# Patient Record
Sex: FEMALE | Race: WHITE | NOT HISPANIC OR LATINO
[De-identification: names, ages, dates, MRNs, and addresses within clinical notes are randomized per-mention and may not be internally consistent; named-entity substitution may affect disease eponyms.]

---

## 2017-01-18 ENCOUNTER — APPOINTMENT (OUTPATIENT)
Dept: OBGYN | Facility: CLINIC | Age: 82
End: 2017-01-18

## 2017-01-24 ENCOUNTER — APPOINTMENT (OUTPATIENT)
Dept: INTERNAL MEDICINE | Facility: CLINIC | Age: 82
End: 2017-01-24

## 2017-01-24 VITALS
SYSTOLIC BLOOD PRESSURE: 160 MMHG | HEART RATE: 68 BPM | WEIGHT: 132 LBS | BODY MASS INDEX: 25.78 KG/M2 | TEMPERATURE: 98.6 F | DIASTOLIC BLOOD PRESSURE: 80 MMHG

## 2017-01-24 VITALS — SYSTOLIC BLOOD PRESSURE: 124 MMHG | DIASTOLIC BLOOD PRESSURE: 80 MMHG

## 2017-01-25 ENCOUNTER — APPOINTMENT (OUTPATIENT)
Dept: OBGYN | Facility: CLINIC | Age: 82
End: 2017-01-25

## 2017-01-25 VITALS
HEART RATE: 84 BPM | DIASTOLIC BLOOD PRESSURE: 78 MMHG | HEIGHT: 60 IN | BODY MASS INDEX: 25.91 KG/M2 | WEIGHT: 132 LBS | SYSTOLIC BLOOD PRESSURE: 166 MMHG

## 2017-01-31 ENCOUNTER — OTHER (OUTPATIENT)
Age: 82
End: 2017-01-31

## 2017-02-01 RX ORDER — NYSTATIN AND TRIAMCINOLONE ACETONIDE 100000; 1 MG/G; MG/G
100000-0.1 CREAM TOPICAL TWICE DAILY
Qty: 1 | Refills: 1 | Status: ACTIVE | COMMUNITY
Start: 2017-01-31 | End: 1900-01-01

## 2017-02-13 ENCOUNTER — OTHER (OUTPATIENT)
Age: 82
End: 2017-02-13

## 2017-03-01 ENCOUNTER — APPOINTMENT (OUTPATIENT)
Dept: INTERNAL MEDICINE | Facility: CLINIC | Age: 82
End: 2017-03-01

## 2017-03-01 ENCOUNTER — LABORATORY RESULT (OUTPATIENT)
Age: 82
End: 2017-03-01

## 2017-03-01 VITALS
DIASTOLIC BLOOD PRESSURE: 70 MMHG | SYSTOLIC BLOOD PRESSURE: 110 MMHG | TEMPERATURE: 98.4 F | WEIGHT: 133 LBS | BODY MASS INDEX: 26.11 KG/M2 | HEIGHT: 60 IN

## 2017-03-02 LAB
ALBUMIN SERPL ELPH-MCNC: 4.4 G/DL
ALP BLD-CCNC: 108 U/L
ALT SERPL-CCNC: 37 U/L
ANION GAP SERPL CALC-SCNC: 19 MMOL/L
AST SERPL-CCNC: 35 U/L
BASOPHILS # BLD AUTO: 0.02 K/UL
BASOPHILS NFR BLD AUTO: 0.3 %
BILIRUB SERPL-MCNC: 1 MG/DL
BUN SERPL-MCNC: 21 MG/DL
CALCIUM SERPL-MCNC: 10.5 MG/DL
CHLORIDE SERPL-SCNC: 102 MMOL/L
CO2 SERPL-SCNC: 21 MMOL/L
CREAT SERPL-MCNC: 0.87 MG/DL
EOSINOPHIL # BLD AUTO: 0.07 K/UL
EOSINOPHIL NFR BLD AUTO: 1 %
GLUCOSE SERPL-MCNC: 97 MG/DL
HCT VFR BLD CALC: 39.6 %
HGB BLD-MCNC: 13.2 G/DL
IMM GRANULOCYTES NFR BLD AUTO: 0 %
LYMPHOCYTES # BLD AUTO: 2.73 K/UL
LYMPHOCYTES NFR BLD AUTO: 38.9 %
MAN DIFF?: NORMAL
MCHC RBC-ENTMCNC: 30.8 PG
MCHC RBC-ENTMCNC: 33.3 GM/DL
MCV RBC AUTO: 92.5 FL
MONOCYTES # BLD AUTO: 0.71 K/UL
MONOCYTES NFR BLD AUTO: 10.1 %
NEUTROPHILS # BLD AUTO: 3.49 K/UL
NEUTROPHILS NFR BLD AUTO: 49.7 %
PLATELET # BLD AUTO: 184 K/UL
POTASSIUM SERPL-SCNC: 5.1 MMOL/L
PROT SERPL-MCNC: 7.6 G/DL
RBC # BLD: 4.28 M/UL
RBC # FLD: 14 %
SODIUM SERPL-SCNC: 142 MMOL/L
WBC # FLD AUTO: 7.02 K/UL

## 2017-03-08 ENCOUNTER — OUTPATIENT (OUTPATIENT)
Dept: OUTPATIENT SERVICES | Facility: HOSPITAL | Age: 82
LOS: 1 days | Discharge: HOME | End: 2017-03-08
Payer: MEDICARE

## 2017-03-08 ENCOUNTER — TRANSCRIPTION ENCOUNTER (OUTPATIENT)
Age: 82
End: 2017-03-08

## 2017-03-08 DIAGNOSIS — Z95.1 PRESENCE OF AORTOCORONARY BYPASS GRAFT: ICD-10-CM

## 2017-03-08 DIAGNOSIS — H25.11 AGE-RELATED NUCLEAR CATARACT, RIGHT EYE: ICD-10-CM

## 2017-03-08 DIAGNOSIS — Z88.4 ALLERGY STATUS TO ANESTHETIC AGENT: ICD-10-CM

## 2017-03-08 DIAGNOSIS — I10 ESSENTIAL (PRIMARY) HYPERTENSION: ICD-10-CM

## 2017-03-08 DIAGNOSIS — I25.10 ATHEROSCLEROTIC HEART DISEASE OF NATIVE CORONARY ARTERY WITHOUT ANGINA PECTORIS: ICD-10-CM

## 2017-03-08 DIAGNOSIS — E78.5 HYPERLIPIDEMIA, UNSPECIFIED: ICD-10-CM

## 2017-03-08 DIAGNOSIS — Z98.89 OTHER SPECIFIED POSTPROCEDURAL STATES: Chronic | ICD-10-CM

## 2017-03-08 PROCEDURE — C1780: CPT

## 2017-03-08 PROCEDURE — 66984 XCAPSL CTRC RMVL W/O ECP: CPT | Mod: RT

## 2017-04-02 ENCOUNTER — RX RENEWAL (OUTPATIENT)
Age: 82
End: 2017-04-02

## 2017-04-26 ENCOUNTER — LABORATORY RESULT (OUTPATIENT)
Age: 82
End: 2017-04-26

## 2017-05-03 ENCOUNTER — APPOINTMENT (OUTPATIENT)
Dept: INTERNAL MEDICINE | Facility: CLINIC | Age: 82
End: 2017-05-03

## 2017-05-03 VITALS
DIASTOLIC BLOOD PRESSURE: 70 MMHG | BODY MASS INDEX: 26.31 KG/M2 | SYSTOLIC BLOOD PRESSURE: 120 MMHG | WEIGHT: 134 LBS | HEIGHT: 60 IN

## 2017-06-01 ENCOUNTER — APPOINTMENT (OUTPATIENT)
Dept: INTERNAL MEDICINE | Facility: CLINIC | Age: 82
End: 2017-06-01

## 2017-06-01 VITALS
TEMPERATURE: 98.1 F | HEIGHT: 60 IN | WEIGHT: 133 LBS | BODY MASS INDEX: 26.11 KG/M2 | DIASTOLIC BLOOD PRESSURE: 76 MMHG | SYSTOLIC BLOOD PRESSURE: 116 MMHG

## 2017-06-09 ENCOUNTER — APPOINTMENT (OUTPATIENT)
Dept: ORTHOPEDIC SURGERY | Facility: CLINIC | Age: 82
End: 2017-06-09

## 2017-06-09 VITALS — RESPIRATION RATE: 14 BRPM | BODY MASS INDEX: 26.5 KG/M2 | WEIGHT: 135 LBS | HEIGHT: 60 IN

## 2017-06-15 ENCOUNTER — OUTPATIENT (OUTPATIENT)
Dept: OUTPATIENT SERVICES | Facility: HOSPITAL | Age: 82
LOS: 1 days | Discharge: HOME | End: 2017-06-15
Payer: MEDICARE

## 2017-06-15 ENCOUNTER — TRANSCRIPTION ENCOUNTER (OUTPATIENT)
Age: 82
End: 2017-06-15

## 2017-06-15 DIAGNOSIS — Z98.89 OTHER SPECIFIED POSTPROCEDURAL STATES: Chronic | ICD-10-CM

## 2017-06-15 DIAGNOSIS — Z79.82 LONG TERM (CURRENT) USE OF ASPIRIN: ICD-10-CM

## 2017-06-15 DIAGNOSIS — F03.90 UNSPECIFIED DEMENTIA WITHOUT BEHAVIORAL DISTURBANCE: ICD-10-CM

## 2017-06-15 DIAGNOSIS — H25.12 AGE-RELATED NUCLEAR CATARACT, LEFT EYE: ICD-10-CM

## 2017-06-15 DIAGNOSIS — Z79.899 OTHER LONG TERM (CURRENT) DRUG THERAPY: ICD-10-CM

## 2017-06-15 DIAGNOSIS — Z85.3 PERSONAL HISTORY OF MALIGNANT NEOPLASM OF BREAST: ICD-10-CM

## 2017-06-15 DIAGNOSIS — H26.9 UNSPECIFIED CATARACT: ICD-10-CM

## 2017-06-15 DIAGNOSIS — I25.10 ATHEROSCLEROTIC HEART DISEASE OF NATIVE CORONARY ARTERY WITHOUT ANGINA PECTORIS: ICD-10-CM

## 2017-06-15 DIAGNOSIS — Z95.1 PRESENCE OF AORTOCORONARY BYPASS GRAFT: ICD-10-CM

## 2017-06-15 PROCEDURE — 66984 XCAPSL CTRC RMVL W/O ECP: CPT | Mod: LT

## 2017-06-15 PROCEDURE — C1780: CPT

## 2017-06-26 ENCOUNTER — APPOINTMENT (OUTPATIENT)
Dept: ORTHOPEDIC SURGERY | Facility: CLINIC | Age: 82
End: 2017-06-26

## 2017-06-26 VITALS — WEIGHT: 135 LBS | RESPIRATION RATE: 14 BRPM | HEIGHT: 60 IN | BODY MASS INDEX: 26.5 KG/M2

## 2017-07-24 ENCOUNTER — APPOINTMENT (OUTPATIENT)
Dept: ORTHOPEDIC SURGERY | Facility: CLINIC | Age: 82
End: 2017-07-24

## 2017-07-24 VITALS — HEIGHT: 60 IN | RESPIRATION RATE: 14 BRPM | WEIGHT: 135 LBS | BODY MASS INDEX: 26.5 KG/M2

## 2017-08-18 ENCOUNTER — RX RENEWAL (OUTPATIENT)
Age: 82
End: 2017-08-18

## 2017-08-31 ENCOUNTER — APPOINTMENT (OUTPATIENT)
Dept: INTERNAL MEDICINE | Facility: CLINIC | Age: 82
End: 2017-08-31
Payer: MEDICARE

## 2017-08-31 ENCOUNTER — NON-APPOINTMENT (OUTPATIENT)
Age: 82
End: 2017-08-31

## 2017-08-31 VITALS
SYSTOLIC BLOOD PRESSURE: 150 MMHG | TEMPERATURE: 99.1 F | WEIGHT: 139 LBS | BODY MASS INDEX: 27.15 KG/M2 | DIASTOLIC BLOOD PRESSURE: 80 MMHG

## 2017-08-31 VITALS — DIASTOLIC BLOOD PRESSURE: 80 MMHG | SYSTOLIC BLOOD PRESSURE: 120 MMHG

## 2017-08-31 PROCEDURE — 93040 RHYTHM ECG WITH REPORT: CPT | Mod: 59

## 2017-08-31 PROCEDURE — 93000 ELECTROCARDIOGRAM COMPLETE: CPT

## 2017-08-31 PROCEDURE — 99214 OFFICE O/P EST MOD 30 MIN: CPT | Mod: 25

## 2017-09-11 ENCOUNTER — APPOINTMENT (OUTPATIENT)
Dept: ORTHOPEDIC SURGERY | Facility: CLINIC | Age: 82
End: 2017-09-11
Payer: MEDICARE

## 2017-09-11 PROCEDURE — 73630 X-RAY EXAM OF FOOT: CPT | Mod: LT

## 2017-09-11 PROCEDURE — 99214 OFFICE O/P EST MOD 30 MIN: CPT

## 2017-09-28 ENCOUNTER — LABORATORY RESULT (OUTPATIENT)
Age: 82
End: 2017-09-28

## 2017-10-02 ENCOUNTER — APPOINTMENT (OUTPATIENT)
Dept: INTERNAL MEDICINE | Facility: CLINIC | Age: 82
End: 2017-10-02
Payer: MEDICARE

## 2017-10-02 VITALS
HEIGHT: 60 IN | WEIGHT: 140 LBS | TEMPERATURE: 97.4 F | BODY MASS INDEX: 27.48 KG/M2 | DIASTOLIC BLOOD PRESSURE: 80 MMHG | SYSTOLIC BLOOD PRESSURE: 140 MMHG

## 2017-10-02 DIAGNOSIS — R53.83 OTHER FATIGUE: ICD-10-CM

## 2017-10-02 PROCEDURE — G0008: CPT

## 2017-10-02 PROCEDURE — 93000 ELECTROCARDIOGRAM COMPLETE: CPT

## 2017-10-02 PROCEDURE — 90686 IIV4 VACC NO PRSV 0.5 ML IM: CPT

## 2017-10-02 PROCEDURE — 94010 BREATHING CAPACITY TEST: CPT

## 2017-10-02 PROCEDURE — 99214 OFFICE O/P EST MOD 30 MIN: CPT | Mod: 25

## 2017-11-29 ENCOUNTER — APPOINTMENT (OUTPATIENT)
Dept: NEUROLOGY | Facility: CLINIC | Age: 82
End: 2017-11-29
Payer: MEDICARE

## 2017-11-29 VITALS
BODY MASS INDEX: 27.48 KG/M2 | WEIGHT: 140 LBS | TEMPERATURE: 97.6 F | OXYGEN SATURATION: 96 % | HEIGHT: 60 IN | DIASTOLIC BLOOD PRESSURE: 81 MMHG | HEART RATE: 74 BPM | SYSTOLIC BLOOD PRESSURE: 134 MMHG

## 2017-11-29 PROCEDURE — 99215 OFFICE O/P EST HI 40 MIN: CPT

## 2018-01-16 ENCOUNTER — OUTPATIENT (OUTPATIENT)
Dept: OUTPATIENT SERVICES | Facility: HOSPITAL | Age: 83
LOS: 1 days | End: 2018-01-16
Payer: MEDICARE

## 2018-01-16 VITALS
HEIGHT: 60 IN | OXYGEN SATURATION: 99 % | RESPIRATION RATE: 18 BRPM | SYSTOLIC BLOOD PRESSURE: 159 MMHG | TEMPERATURE: 98 F | HEART RATE: 68 BPM | WEIGHT: 139.99 LBS | DIASTOLIC BLOOD PRESSURE: 72 MMHG

## 2018-01-16 DIAGNOSIS — Z98.89 OTHER SPECIFIED POSTPROCEDURAL STATES: Chronic | ICD-10-CM

## 2018-01-16 DIAGNOSIS — I66.9 OCCLUSION AND STENOSIS OF UNSPECIFIED CEREBRAL ARTERY: ICD-10-CM

## 2018-01-16 PROCEDURE — 93010 ELECTROCARDIOGRAM REPORT: CPT

## 2018-01-16 PROCEDURE — 33284: CPT

## 2018-01-16 PROCEDURE — 93005 ELECTROCARDIOGRAM TRACING: CPT

## 2018-01-16 RX ORDER — SODIUM CHLORIDE 9 MG/ML
3 INJECTION INTRAMUSCULAR; INTRAVENOUS; SUBCUTANEOUS EVERY 8 HOURS
Qty: 0 | Refills: 0 | Status: DISCONTINUED | OUTPATIENT
Start: 2018-01-16 | End: 2018-01-31

## 2018-01-16 NOTE — H&P CARDIOLOGY - PMH
Breast cancer    CAD (coronary artery disease)    HLD (hyperlipidemia)    Peripheral vision loss    Seizure    TIA (transient ischemic attack)

## 2018-01-16 NOTE — H&P CARDIOLOGY - HISTORY OF PRESENT ILLNESS
83 year old  female pt PMHx of CAD s/p 5 vessel CABG, seizures, TIA, breast cancer s/p left lumpectomy, presents for loop recorder extraction. Pt reports she had arrhythmia one time and had Loop recorder insertion, denies chest pain, palpitation or SOB.

## 2018-01-16 NOTE — H&P CARDIOLOGY - PSH
S/P CABG x 5  2006  S/P carotid endarterectomy  right, 2006  S/P lumpectomy, right breast    S/P tonsillectomy

## 2018-01-16 NOTE — H&P CARDIOLOGY - FAMILY HISTORY
Mother  Still living? No  Family history of heart attack, Age at diagnosis: Age Unknown  Family history of ovarian cancer, Age at diagnosis: Age Unknown     Sibling  Still living? No  Family history of heart disease, Age at diagnosis: Age Unknown

## 2018-01-19 ENCOUNTER — LABORATORY RESULT (OUTPATIENT)
Age: 83
End: 2018-01-19

## 2018-01-24 ENCOUNTER — APPOINTMENT (OUTPATIENT)
Dept: INTERNAL MEDICINE | Facility: CLINIC | Age: 83
End: 2018-01-24
Payer: MEDICARE

## 2018-01-24 VITALS
WEIGHT: 140 LBS | BODY MASS INDEX: 27.48 KG/M2 | SYSTOLIC BLOOD PRESSURE: 120 MMHG | DIASTOLIC BLOOD PRESSURE: 66 MMHG | HEIGHT: 60 IN

## 2018-01-24 PROCEDURE — 90715 TDAP VACCINE 7 YRS/> IM: CPT | Mod: GY

## 2018-01-24 PROCEDURE — 99214 OFFICE O/P EST MOD 30 MIN: CPT | Mod: 25

## 2018-01-24 PROCEDURE — 90471 IMMUNIZATION ADMIN: CPT | Mod: GY

## 2018-01-31 ENCOUNTER — APPOINTMENT (OUTPATIENT)
Dept: ELECTROPHYSIOLOGY | Facility: CLINIC | Age: 83
End: 2018-01-31
Payer: MEDICARE

## 2018-01-31 ENCOUNTER — NON-APPOINTMENT (OUTPATIENT)
Age: 83
End: 2018-01-31

## 2018-01-31 VITALS — HEART RATE: 71 BPM | OXYGEN SATURATION: 98 % | SYSTOLIC BLOOD PRESSURE: 165 MMHG | DIASTOLIC BLOOD PRESSURE: 76 MMHG

## 2018-01-31 PROCEDURE — 99024 POSTOP FOLLOW-UP VISIT: CPT

## 2018-02-22 ENCOUNTER — RX RENEWAL (OUTPATIENT)
Age: 83
End: 2018-02-22

## 2018-03-18 ENCOUNTER — RX RENEWAL (OUTPATIENT)
Age: 83
End: 2018-03-18

## 2018-04-26 ENCOUNTER — APPOINTMENT (OUTPATIENT)
Dept: INTERNAL MEDICINE | Facility: CLINIC | Age: 83
End: 2018-04-26

## 2018-08-06 ENCOUNTER — NON-APPOINTMENT (OUTPATIENT)
Age: 83
End: 2018-08-06

## 2018-08-06 ENCOUNTER — APPOINTMENT (OUTPATIENT)
Dept: INTERNAL MEDICINE | Facility: CLINIC | Age: 83
End: 2018-08-06
Payer: MEDICARE

## 2018-08-06 VITALS
SYSTOLIC BLOOD PRESSURE: 142 MMHG | DIASTOLIC BLOOD PRESSURE: 78 MMHG | WEIGHT: 137 LBS | BODY MASS INDEX: 26.76 KG/M2 | TEMPERATURE: 98.3 F

## 2018-08-06 VITALS — SYSTOLIC BLOOD PRESSURE: 130 MMHG | DIASTOLIC BLOOD PRESSURE: 70 MMHG

## 2018-08-06 PROCEDURE — 99214 OFFICE O/P EST MOD 30 MIN: CPT | Mod: 25

## 2018-08-06 PROCEDURE — 93000 ELECTROCARDIOGRAM COMPLETE: CPT

## 2018-08-07 ENCOUNTER — NON-APPOINTMENT (OUTPATIENT)
Age: 83
End: 2018-08-07

## 2018-08-23 ENCOUNTER — LABORATORY RESULT (OUTPATIENT)
Age: 83
End: 2018-08-23

## 2018-08-26 ENCOUNTER — FORM ENCOUNTER (OUTPATIENT)
Age: 83
End: 2018-08-26

## 2018-08-27 ENCOUNTER — OUTPATIENT (OUTPATIENT)
Dept: OUTPATIENT SERVICES | Facility: HOSPITAL | Age: 83
LOS: 1 days | End: 2018-08-27
Payer: MEDICARE

## 2018-08-27 ENCOUNTER — APPOINTMENT (OUTPATIENT)
Dept: ULTRASOUND IMAGING | Facility: CLINIC | Age: 83
End: 2018-08-27
Payer: MEDICARE

## 2018-08-27 DIAGNOSIS — Z00.8 ENCOUNTER FOR OTHER GENERAL EXAMINATION: ICD-10-CM

## 2018-08-27 DIAGNOSIS — Z98.89 OTHER SPECIFIED POSTPROCEDURAL STATES: Chronic | ICD-10-CM

## 2018-08-27 PROCEDURE — 93880 EXTRACRANIAL BILAT STUDY: CPT | Mod: 26

## 2018-08-27 PROCEDURE — 93880 EXTRACRANIAL BILAT STUDY: CPT

## 2018-08-30 ENCOUNTER — APPOINTMENT (OUTPATIENT)
Dept: INTERNAL MEDICINE | Facility: CLINIC | Age: 83
End: 2018-08-30
Payer: MEDICARE

## 2018-08-30 VITALS
SYSTOLIC BLOOD PRESSURE: 140 MMHG | DIASTOLIC BLOOD PRESSURE: 80 MMHG | BODY MASS INDEX: 27.48 KG/M2 | HEIGHT: 60 IN | WEIGHT: 140 LBS

## 2018-08-30 PROCEDURE — 99214 OFFICE O/P EST MOD 30 MIN: CPT

## 2018-08-30 NOTE — ASSESSMENT
[FreeTextEntry1] : Continue same blood pressure medication.  Follow blood pressure.\par Continue same cholesterol medication.  Follow lipid.\par follow A1C\par CAD-stable.  Sees card. 2/year\par CVA-stable.\par dementia-continue same med.  Followed by neuro.

## 2018-08-30 NOTE — HISTORY OF PRESENT ILLNESS
[FreeTextEntry1] : CAD, CVA, HTN, hyperchol., high glucose, and dementia [de-identified] : No new complaints.\par

## 2018-08-30 NOTE — PHYSICAL EXAM
[No Acute Distress] : no acute distress [Well Nourished] : well nourished [Well Developed] : well developed [Well-Appearing] : well-appearing [Normal Outer Ear/Nose] : the outer ears and nose were normal in appearance [Supple] : supple [No Respiratory Distress] : no respiratory distress  [Clear to Auscultation] : lungs were clear to auscultation bilaterally [Normal Rate] : normal rate  [Regular Rhythm] : with a regular rhythm [Soft] : abdomen soft [No CVA Tenderness] : no CVA  tenderness [No Spinal Tenderness] : no spinal tenderness [Cyanosis] : no cyanosis [Abnormal Temperature] : normal temperature [Normal Affect] : the affect was normal [Normal Mood] : the mood was normal

## 2018-09-25 ENCOUNTER — APPOINTMENT (OUTPATIENT)
Dept: INTERNAL MEDICINE | Facility: CLINIC | Age: 83
End: 2018-09-25

## 2018-10-02 ENCOUNTER — APPOINTMENT (OUTPATIENT)
Dept: NEUROLOGY | Facility: CLINIC | Age: 83
End: 2018-10-02
Payer: MEDICARE

## 2018-10-02 VITALS
HEART RATE: 77 BPM | RESPIRATION RATE: 16 BRPM | SYSTOLIC BLOOD PRESSURE: 138 MMHG | OXYGEN SATURATION: 97 % | TEMPERATURE: 97.9 F | DIASTOLIC BLOOD PRESSURE: 77 MMHG

## 2018-10-02 PROCEDURE — 99215 OFFICE O/P EST HI 40 MIN: CPT

## 2018-10-05 ENCOUNTER — APPOINTMENT (OUTPATIENT)
Dept: NEUROLOGY | Facility: CLINIC | Age: 83
End: 2018-10-05
Payer: MEDICARE

## 2018-10-05 PROCEDURE — 95819 EEG AWAKE AND ASLEEP: CPT

## 2018-10-09 ENCOUNTER — OTHER (OUTPATIENT)
Age: 83
End: 2018-10-09

## 2018-10-16 ENCOUNTER — APPOINTMENT (OUTPATIENT)
Dept: OPHTHALMOLOGY | Facility: CLINIC | Age: 83
End: 2018-10-16
Payer: MEDICARE

## 2018-10-16 PROCEDURE — 92083 EXTENDED VISUAL FIELD XM: CPT

## 2018-10-16 PROCEDURE — 92012 INTRM OPH EXAM EST PATIENT: CPT

## 2018-10-24 ENCOUNTER — APPOINTMENT (OUTPATIENT)
Dept: INTERNAL MEDICINE | Facility: CLINIC | Age: 83
End: 2018-10-24
Payer: MEDICARE

## 2018-10-24 ENCOUNTER — MED ADMIN CHARGE (OUTPATIENT)
Age: 83
End: 2018-10-24

## 2018-10-24 PROCEDURE — G0008: CPT

## 2018-10-24 PROCEDURE — 90686 IIV4 VACC NO PRSV 0.5 ML IM: CPT

## 2018-10-24 PROCEDURE — 93306 TTE W/DOPPLER COMPLETE: CPT | Mod: TC

## 2018-10-24 PROCEDURE — 93306 TTE W/DOPPLER COMPLETE: CPT | Mod: 26

## 2018-10-31 ENCOUNTER — APPOINTMENT (OUTPATIENT)
Dept: NEUROLOGY | Facility: CLINIC | Age: 83
End: 2018-10-31

## 2018-11-20 ENCOUNTER — LABORATORY RESULT (OUTPATIENT)
Age: 83
End: 2018-11-20

## 2018-12-04 ENCOUNTER — APPOINTMENT (OUTPATIENT)
Dept: INTERNAL MEDICINE | Facility: CLINIC | Age: 83
End: 2018-12-04
Payer: MEDICARE

## 2018-12-04 VITALS
DIASTOLIC BLOOD PRESSURE: 80 MMHG | SYSTOLIC BLOOD PRESSURE: 140 MMHG | WEIGHT: 139 LBS | HEIGHT: 60 IN | BODY MASS INDEX: 27.29 KG/M2

## 2018-12-04 PROCEDURE — 93000 ELECTROCARDIOGRAM COMPLETE: CPT

## 2018-12-04 PROCEDURE — 94010 BREATHING CAPACITY TEST: CPT

## 2018-12-04 PROCEDURE — 99214 OFFICE O/P EST MOD 30 MIN: CPT | Mod: 25

## 2018-12-04 NOTE — PHYSICAL EXAM
[No Acute Distress] : no acute distress [Well Nourished] : well nourished [Well Developed] : well developed [Well-Appearing] : well-appearing [Normal Outer Ear/Nose] : the outer ears and nose were normal in appearance [Supple] : supple [No Respiratory Distress] : no respiratory distress  [Clear to Auscultation] : lungs were clear to auscultation bilaterally [Normal Rate] : normal rate  [Regular Rhythm] : with a regular rhythm [No Edema] : there was no peripheral edema [Soft] : abdomen soft [Non Tender] : non-tender [Normal Posterior Cervical Nodes] : no posterior cervical lymphadenopathy [Normal Anterior Cervical Nodes] : no anterior cervical lymphadenopathy [No CVA Tenderness] : no CVA  tenderness [No Spinal Tenderness] : no spinal tenderness [Normal Affect] : the affect was normal [Normal Mood] : the mood was normal [Cyanosis] : no cyanosis [Abnormal Temperature] : normal temperature

## 2018-12-04 NOTE — ASSESSMENT
[FreeTextEntry1] : CAD-stable.  followed by card.\par CVA-stable.\par Continue same blood pressure medication.  Follow blood pressure.\par Continue same cholesterol medication.  Follow lipid.\par follow A1C\par RX shingrix\par patient has an aid at home (long term care ins.)

## 2018-12-04 NOTE — HISTORY OF PRESENT ILLNESS
[FreeTextEntry1] : CVA, CAD, Hypertension, hypercholesterol, and high glucose [de-identified] : \par occasional cough

## 2019-01-01 ENCOUNTER — LABORATORY RESULT (OUTPATIENT)
Age: 84
End: 2019-01-01

## 2019-01-01 ENCOUNTER — APPOINTMENT (OUTPATIENT)
Dept: INTERNAL MEDICINE | Facility: CLINIC | Age: 84
End: 2019-01-01
Payer: MEDICARE

## 2019-01-01 ENCOUNTER — APPOINTMENT (OUTPATIENT)
Dept: INTERNAL MEDICINE | Facility: CLINIC | Age: 84
End: 2019-01-01

## 2019-01-01 ENCOUNTER — RX RENEWAL (OUTPATIENT)
Age: 84
End: 2019-01-01

## 2019-01-01 ENCOUNTER — NON-APPOINTMENT (OUTPATIENT)
Age: 84
End: 2019-01-01

## 2019-01-01 ENCOUNTER — OUTPATIENT (OUTPATIENT)
Dept: OUTPATIENT SERVICES | Facility: HOSPITAL | Age: 84
LOS: 1 days | End: 2019-01-01
Payer: MEDICARE

## 2019-01-01 VITALS
WEIGHT: 145 LBS | BODY MASS INDEX: 28.32 KG/M2 | SYSTOLIC BLOOD PRESSURE: 110 MMHG | TEMPERATURE: 98.4 F | DIASTOLIC BLOOD PRESSURE: 86 MMHG

## 2019-01-01 VITALS
DIASTOLIC BLOOD PRESSURE: 65 MMHG | TEMPERATURE: 98 F | HEART RATE: 83 BPM | SYSTOLIC BLOOD PRESSURE: 117 MMHG | WEIGHT: 139.99 LBS | HEIGHT: 60 IN | RESPIRATION RATE: 14 BRPM | OXYGEN SATURATION: 99 %

## 2019-01-01 VITALS
WEIGHT: 140 LBS | DIASTOLIC BLOOD PRESSURE: 76 MMHG | SYSTOLIC BLOOD PRESSURE: 138 MMHG | HEIGHT: 60 IN | BODY MASS INDEX: 27.48 KG/M2

## 2019-01-01 VITALS
BODY MASS INDEX: 28.47 KG/M2 | DIASTOLIC BLOOD PRESSURE: 86 MMHG | SYSTOLIC BLOOD PRESSURE: 124 MMHG | HEART RATE: 68 BPM | WEIGHT: 145 LBS | HEIGHT: 60 IN

## 2019-01-01 VITALS — SYSTOLIC BLOOD PRESSURE: 140 MMHG | DIASTOLIC BLOOD PRESSURE: 80 MMHG

## 2019-01-01 DIAGNOSIS — I34.0 NONRHEUMATIC MITRAL (VALVE) INSUFFICIENCY: ICD-10-CM

## 2019-01-01 DIAGNOSIS — Z98.89 OTHER SPECIFIED POSTPROCEDURAL STATES: Chronic | ICD-10-CM

## 2019-01-01 DIAGNOSIS — E55.9 VITAMIN D DEFICIENCY, UNSPECIFIED: ICD-10-CM

## 2019-01-01 DIAGNOSIS — I25.10 ATHEROSCLEROTIC HEART DISEASE OF NATIVE CORONARY ARTERY WITHOUT ANGINA PECTORIS: ICD-10-CM

## 2019-01-01 DIAGNOSIS — Z85.3 PERSONAL HISTORY OF MALIGNANT NEOPLASM OF BREAST: ICD-10-CM

## 2019-01-01 DIAGNOSIS — Z98.890 OTHER SPECIFIED POSTPROCEDURAL STATES: Chronic | ICD-10-CM

## 2019-01-01 LAB
ALBUMIN SERPL ELPH-MCNC: 4.5 G/DL — SIGNIFICANT CHANGE UP (ref 3.3–5)
ALP SERPL-CCNC: 107 U/L — SIGNIFICANT CHANGE UP (ref 40–120)
ALT FLD-CCNC: 30 U/L — SIGNIFICANT CHANGE UP (ref 10–45)
ANION GAP SERPL CALC-SCNC: 11 MMOL/L — SIGNIFICANT CHANGE UP (ref 5–17)
AST SERPL-CCNC: 26 U/L — SIGNIFICANT CHANGE UP (ref 10–40)
BILIRUB SERPL-MCNC: 0.9 MG/DL — SIGNIFICANT CHANGE UP (ref 0.2–1.2)
BUN SERPL-MCNC: 18 MG/DL — SIGNIFICANT CHANGE UP (ref 7–23)
CALCIUM SERPL-MCNC: 10.3 MG/DL — SIGNIFICANT CHANGE UP (ref 8.4–10.5)
CHLORIDE SERPL-SCNC: 101 MMOL/L — SIGNIFICANT CHANGE UP (ref 96–108)
CO2 SERPL-SCNC: 26 MMOL/L — SIGNIFICANT CHANGE UP (ref 22–31)
CREAT SERPL-MCNC: 0.86 MG/DL — SIGNIFICANT CHANGE UP (ref 0.5–1.3)
GLUCOSE SERPL-MCNC: 103 MG/DL — HIGH (ref 70–99)
HCT VFR BLD CALC: 39.1 % — SIGNIFICANT CHANGE UP (ref 34.5–45)
HGB BLD-MCNC: 13.2 G/DL — SIGNIFICANT CHANGE UP (ref 11.5–15.5)
MCHC RBC-ENTMCNC: 32.5 PG — SIGNIFICANT CHANGE UP (ref 27–34)
MCHC RBC-ENTMCNC: 33.8 GM/DL — SIGNIFICANT CHANGE UP (ref 32–36)
MCV RBC AUTO: 96.4 FL — SIGNIFICANT CHANGE UP (ref 80–100)
PLATELET # BLD AUTO: 155 K/UL — SIGNIFICANT CHANGE UP (ref 150–400)
POTASSIUM SERPL-MCNC: 4.7 MMOL/L — SIGNIFICANT CHANGE UP (ref 3.5–5.3)
POTASSIUM SERPL-SCNC: 4.7 MMOL/L — SIGNIFICANT CHANGE UP (ref 3.5–5.3)
PROT SERPL-MCNC: 7.8 G/DL — SIGNIFICANT CHANGE UP (ref 6–8.3)
RBC # BLD: 4.06 M/UL — SIGNIFICANT CHANGE UP (ref 3.8–5.2)
RBC # FLD: 11.5 % — SIGNIFICANT CHANGE UP (ref 10.3–14.5)
SODIUM SERPL-SCNC: 138 MMOL/L — SIGNIFICANT CHANGE UP (ref 135–145)
WBC # BLD: 6.6 K/UL — SIGNIFICANT CHANGE UP (ref 3.8–10.5)
WBC # FLD AUTO: 6.6 K/UL — SIGNIFICANT CHANGE UP (ref 3.8–10.5)

## 2019-01-01 PROCEDURE — ZZZZZ: CPT

## 2019-01-01 PROCEDURE — 93000 ELECTROCARDIOGRAM COMPLETE: CPT

## 2019-01-01 PROCEDURE — 99152 MOD SED SAME PHYS/QHP 5/>YRS: CPT | Mod: GC

## 2019-01-01 PROCEDURE — 93010 ELECTROCARDIOGRAM REPORT: CPT

## 2019-01-01 PROCEDURE — 93459 L HRT ART/GRFT ANGIO: CPT

## 2019-01-01 PROCEDURE — 93306 TTE W/DOPPLER COMPLETE: CPT | Mod: TC

## 2019-01-01 PROCEDURE — 99214 OFFICE O/P EST MOD 30 MIN: CPT | Mod: 25

## 2019-01-01 PROCEDURE — 93567 NJX CAR CTH SPRVLV AORTGRPHY: CPT

## 2019-01-01 PROCEDURE — 93005 ELECTROCARDIOGRAM TRACING: CPT

## 2019-01-01 PROCEDURE — C1887: CPT

## 2019-01-01 PROCEDURE — 93351 STRESS TTE COMPLETE: CPT

## 2019-01-01 PROCEDURE — 90662 IIV NO PRSV INCREASED AG IM: CPT

## 2019-01-01 PROCEDURE — 85027 COMPLETE CBC AUTOMATED: CPT

## 2019-01-01 PROCEDURE — G0008: CPT

## 2019-01-01 PROCEDURE — 80053 COMPREHEN METABOLIC PANEL: CPT

## 2019-01-01 PROCEDURE — 93459 L HRT ART/GRFT ANGIO: CPT | Mod: 26,GC

## 2019-01-01 PROCEDURE — C1769: CPT

## 2019-01-01 PROCEDURE — 93306 TTE W/DOPPLER COMPLETE: CPT | Mod: 26

## 2019-01-01 PROCEDURE — 93567 NJX CAR CTH SPRVLV AORTGRPHY: CPT | Mod: GC

## 2019-01-01 PROCEDURE — 99152 MOD SED SAME PHYS/QHP 5/>YRS: CPT

## 2019-01-01 PROCEDURE — C1894: CPT

## 2019-02-11 ENCOUNTER — APPOINTMENT (OUTPATIENT)
Dept: INTERNAL MEDICINE | Facility: CLINIC | Age: 84
End: 2019-02-11

## 2019-02-25 ENCOUNTER — LABORATORY RESULT (OUTPATIENT)
Age: 84
End: 2019-02-25

## 2019-03-06 ENCOUNTER — APPOINTMENT (OUTPATIENT)
Dept: INTERNAL MEDICINE | Facility: CLINIC | Age: 84
End: 2019-03-06
Payer: MEDICARE

## 2019-03-06 VITALS
HEIGHT: 60 IN | SYSTOLIC BLOOD PRESSURE: 130 MMHG | WEIGHT: 141 LBS | DIASTOLIC BLOOD PRESSURE: 80 MMHG | BODY MASS INDEX: 27.68 KG/M2

## 2019-03-06 PROCEDURE — 99214 OFFICE O/P EST MOD 30 MIN: CPT

## 2019-03-06 NOTE — ASSESSMENT
[FreeTextEntry1] : CAD-stable.  per card\par CVA-stable.  per neuro\par Dementia-continue same medication.\par Continue same blood pressure medication.  Follow blood pressure.\par Continue same cholesterol medication.  Follow lipid.\par follow A1C

## 2019-03-06 NOTE — HISTORY OF PRESENT ILLNESS
[FreeTextEntry1] : CAD, HTN, CVA, dementia, high glucose, and hypercholesterol [de-identified] : no complaints\par

## 2019-03-27 ENCOUNTER — NON-APPOINTMENT (OUTPATIENT)
Age: 84
End: 2019-03-27

## 2019-03-27 ENCOUNTER — APPOINTMENT (OUTPATIENT)
Dept: INTERNAL MEDICINE | Facility: CLINIC | Age: 84
End: 2019-03-27
Payer: MEDICARE

## 2019-03-27 VITALS — SYSTOLIC BLOOD PRESSURE: 130 MMHG | DIASTOLIC BLOOD PRESSURE: 80 MMHG

## 2019-03-27 VITALS
TEMPERATURE: 98.5 F | BODY MASS INDEX: 27.54 KG/M2 | DIASTOLIC BLOOD PRESSURE: 70 MMHG | WEIGHT: 141 LBS | SYSTOLIC BLOOD PRESSURE: 130 MMHG

## 2019-03-27 PROCEDURE — 99214 OFFICE O/P EST MOD 30 MIN: CPT | Mod: 25

## 2019-03-27 PROCEDURE — 93000 ELECTROCARDIOGRAM COMPLETE: CPT

## 2019-03-27 NOTE — PHYSICAL EXAM
[General Appearance - Well Developed] : well developed [Normal Appearance] : normal appearance [Well Groomed] : well groomed [General Appearance - Well Nourished] : well nourished [No Deformities] : no deformities [General Appearance - In No Acute Distress] : no acute distress [Normal Conjunctiva] : the conjunctiva exhibited no abnormalities [Eyelids - No Xanthelasma] : the eyelids demonstrated no xanthelasmas [Normal Oral Mucosa] : normal oral mucosa [No Oral Pallor] : no oral pallor [No Oral Cyanosis] : no oral cyanosis [Normal Jugular Venous A Waves Present] : normal jugular venous A waves present [Normal Jugular Venous V Waves Present] : normal jugular venous V waves present [No Jugular Venous Padilla A Waves] : no jugular venous padilla A waves [Respiration, Rhythm And Depth] : normal respiratory rhythm and effort [Exaggerated Use Of Accessory Muscles For Inspiration] : no accessory muscle use [Auscultation Breath Sounds / Voice Sounds] : lungs were clear to auscultation bilaterally [Heart Rate And Rhythm] : heart rate and rhythm were normal [Heart Sounds] : normal S1 and S2 [Murmurs] : no murmurs present [Abdomen Soft] : soft [Abdomen Tenderness] : non-tender [Abdomen Mass (___ Cm)] : no abdominal mass palpated [Abnormal Walk] : normal gait [Gait - Sufficient For Exercise Testing] : the gait was sufficient for exercise testing [Nail Clubbing] : no clubbing of the fingernails [Cyanosis, Localized] : no localized cyanosis [Petechial Hemorrhages (___cm)] : no petechial hemorrhages [] : no ischemic changes

## 2019-03-27 NOTE — REASON FOR VISIT
[Follow-Up - Clinic] : a clinic follow-up of [Dyspnea] : dyspnea [Hyperlipidemia] : hyperlipidemia [Mitral Regurgitation] : mitral regurgitation [FreeTextEntry1] : \par generally well;\par chronic, mild ROCA, e.g walking up flight of stairs on occasion; not walking on level ground; no change\par no PND or orthopnea; no chest pain\par \par

## 2019-03-27 NOTE — ASSESSMENT
[FreeTextEntry1] : \par \par ecg---SR; minor NSSTT abn.; NSCSPT\par \par imp---CAD---s/p remote CABG; asx.\par           MR---mod.-severe on echo 10/24/18 with normal LVEF; PASP=40\par           hypertension---normotension on meds\par           SOB---mild, exertional; probably unchanged\par \par plan--echo after 10/24/2019 (SOB)\par          stress echo---6 mos (CAD)\par          Shingrix administered\par          genl. med f/u per Dr. Floyd

## 2019-05-30 ENCOUNTER — LABORATORY RESULT (OUTPATIENT)
Age: 84
End: 2019-05-30

## 2019-06-06 ENCOUNTER — APPOINTMENT (OUTPATIENT)
Dept: INTERNAL MEDICINE | Facility: CLINIC | Age: 84
End: 2019-06-06
Payer: MEDICARE

## 2019-06-06 VITALS
BODY MASS INDEX: 28.47 KG/M2 | SYSTOLIC BLOOD PRESSURE: 140 MMHG | WEIGHT: 145 LBS | DIASTOLIC BLOOD PRESSURE: 76 MMHG | HEIGHT: 60 IN

## 2019-06-06 PROCEDURE — 90471 IMMUNIZATION ADMIN: CPT | Mod: GY

## 2019-06-06 PROCEDURE — 99214 OFFICE O/P EST MOD 30 MIN: CPT | Mod: 25

## 2019-06-06 PROCEDURE — 90750 HZV VACC RECOMBINANT IM: CPT | Mod: GY

## 2019-06-06 NOTE — HISTORY OF PRESENT ILLNESS
[de-identified] : Many years of bilateral knee pain. No trauma. No fever. [FreeTextEntry1] : CAD, Dementia, hypertension, hypercholesterol, and high glucose

## 2019-06-06 NOTE — PHYSICAL EXAM
[No Acute Distress] : no acute distress [Well Developed] : well developed [Well Nourished] : well nourished [Well-Appearing] : well-appearing [Supple] : supple [Normal Outer Ear/Nose] : the outer ears and nose were normal in appearance [No Respiratory Distress] : no respiratory distress  [Clear to Auscultation] : lungs were clear to auscultation bilaterally [Normal Rate] : normal rate  [Regular Rhythm] : with a regular rhythm [No CVA Tenderness] : no CVA  tenderness [Soft] : abdomen soft [No Spinal Tenderness] : no spinal tenderness [Cyanosis] : no cyanosis [Abnormal Temperature] : normal temperature [Speech Grossly Normal] : speech grossly normal [Normal Affect] : the affect was normal [Normal Mood] : the mood was normal

## 2019-06-06 NOTE — ASSESSMENT
[FreeTextEntry1] : CAD-stable.  per card\par Continue same blood pressure medication.  Follow blood pressure.\par Continue same cholesterol medication.  Follow lipid.\par follow A1C\par dementia-stable.  Cont. same med.\par Shingrix #2\par [place]. knee pain-PT

## 2019-09-09 NOTE — ASSESSMENT
[FreeTextEntry1] : \par \par stress echo---test d/c'd after 2:55 minutes due to hypotension (87 systolic); placed in Trendelenberg with\par                         restoration of BP to 110 and eventually 140, systolic; pt. had transient SOB but\par                         no chest discomfort; echo---nonaugmentation of septum and inferior walls post exercise;\par                         pulse ox.=99%\par \par ecg---post stress test--SR; at 81; NSSTT abn.; NSCSPT\par \par \par imp--CAD---s/p remote CABG with markedly abnormal  stress echo as above\par          MR---mod. -severe as above with good LVEF per echo of Oct. 2018\par          TR---mod.-severe on same echo\par          cardiomyopathy---grade 2 diastolic dysfunction\par \par plan---coronary angio. tomw. (Dr. Broderick)\par            rest at home pending above\par            ED if any chest discomfort or SOB\par \par          \par          \par

## 2019-09-09 NOTE — PHYSICAL EXAM
[General Appearance - Well Developed] : well developed [Normal Appearance] : normal appearance [Well Groomed] : well groomed [General Appearance - Well Nourished] : well nourished [No Deformities] : no deformities [General Appearance - In No Acute Distress] : no acute distress [Eyelids - No Xanthelasma] : the eyelids demonstrated no xanthelasmas [Normal Conjunctiva] : the conjunctiva exhibited no abnormalities [Normal Oral Mucosa] : normal oral mucosa [No Oral Pallor] : no oral pallor [No Oral Cyanosis] : no oral cyanosis [Normal Jugular Venous A Waves Present] : normal jugular venous A waves present [Normal Jugular Venous V Waves Present] : normal jugular venous V waves present [No Jugular Venous Padilla A Waves] : no jugular venous padilla A waves [Respiration, Rhythm And Depth] : normal respiratory rhythm and effort [Exaggerated Use Of Accessory Muscles For Inspiration] : no accessory muscle use [Auscultation Breath Sounds / Voice Sounds] : lungs were clear to auscultation bilaterally [Heart Rate And Rhythm] : heart rate and rhythm were normal [Heart Sounds] : normal S1 and S2 [Murmurs] : no murmurs present [Abdomen Soft] : soft [Abdomen Tenderness] : non-tender [Abdomen Mass (___ Cm)] : no abdominal mass palpated [Abnormal Walk] : normal gait [Gait - Sufficient For Exercise Testing] : the gait was sufficient for exercise testing [Nail Clubbing] : no clubbing of the fingernails [Cyanosis, Localized] : no localized cyanosis [Petechial Hemorrhages (___cm)] : no petechial hemorrhages [] : no ischemic changes

## 2019-09-09 NOTE — REASON FOR VISIT
[Follow-Up - Clinic] : a clinic follow-up of [Chest Pain] : chest pain [Coronary Artery Disease] : coronary artery disease [Dyspnea] : dyspnea [FreeTextEntry1] : \par \par here for stress echo;\par \par had remote CABG (5 grafts) while living in [place];\par stress echo in March 2016---exercised for 6 minutes with normal stress echo (abn. stress ecg)\par pt. experiences lower substernal chest discomfort when walking short distances;\par stops and waits until sx. subsided and is able to walk w/o difficulty afterwards;\par she denies SOB now but has admitted to mild ROCA on past visits.\par There has been no change in frequency or severity of sx. recently\par \par echo--Oct. 24, 2018--LVEF=69%; PASP=40; moderate LV diastolic dysfunction; \par mod.-severe MR and TR; mod. AR

## 2019-09-10 NOTE — H&P CARDIOLOGY - PSH
S/P CABG x 5  2006  S/P carotid endarterectomy  right, 2006  S/P lumpectomy, right breast    S/P tonsillectomy H/O lumpectomy  Left breast  S/P CABG x 5  2006  S/P carotid endarterectomy  right, 2006  S/P tonsillectomy

## 2019-09-10 NOTE — H&P CARDIOLOGY - RESPIRATORY
Bladder non-tender and non-distended. Urine clear yellow.
Breath Sounds equal & clear to percussion & auscultation, no accessory muscle use

## 2019-09-10 NOTE — H&P CARDIOLOGY - PMH
Breast cancer    CAD (coronary artery disease)    Dementia    HLD (hyperlipidemia)    Peripheral vision loss    Seizure    TIA (transient ischemic attack)

## 2019-09-10 NOTE — H&P CARDIOLOGY - HISTORY OF PRESENT ILLNESS
85 year old  female pt PMHx of CAD s/p 5 vessel CABG, HTN, HLD, seizures, TIA, breast cancer s/p left lumpectomy, presents for [facility] following an abnormal stress test. Patient states she gets occasional SOB, with activity over the the past few months. Seen and evaluated by Dr. Diego Lock, NST done and referred for cardiac cath. 85 year old  female pt PMHx of CAD s/p 5 vessel CABG, HTN, HLD, seizures, TIA, breast cancer s/p left lumpectomy, presents for [facility]. Patient states she gets occasional SOB, with activity over the the past few months. Seen and evaluated by [facility] ClemonsT done and referred for cardiac cath. Patient had a stress test scheduled yesterday , but unable to complete due to severe Hypotension.

## 2019-09-12 PROBLEM — F03.90 UNSPECIFIED DEMENTIA WITHOUT BEHAVIORAL DISTURBANCE: Chronic | Status: ACTIVE | Noted: 2019-01-01

## 2019-09-19 NOTE — PHYSICAL EXAM
[General Appearance - Well Developed] : well developed [Normal Appearance] : normal appearance [Well Groomed] : well groomed [General Appearance - Well Nourished] : well nourished [No Deformities] : no deformities [General Appearance - In No Acute Distress] : no acute distress [Normal Conjunctiva] : the conjunctiva exhibited no abnormalities [Eyelids - No Xanthelasma] : the eyelids demonstrated no xanthelasmas [Normal Oral Mucosa] : normal oral mucosa [No Oral Pallor] : no oral pallor [No Oral Cyanosis] : no oral cyanosis [Normal Jugular Venous A Waves Present] : normal jugular venous A waves present [Normal Jugular Venous V Waves Present] : normal jugular venous V waves present [No Jugular Venous Padilla A Waves] : no jugular venous padilla A waves [Respiration, Rhythm And Depth] : normal respiratory rhythm and effort [Exaggerated Use Of Accessory Muscles For Inspiration] : no accessory muscle use [Auscultation Breath Sounds / Voice Sounds] : lungs were clear to auscultation bilaterally [Heart Rate And Rhythm] : heart rate and rhythm were normal [Heart Sounds] : normal S1 and S2 [Systolic grade ___/6] : A grade [unfilled]/6 systolic murmur was heard. [Abdomen Soft] : soft [Abdomen Tenderness] : non-tender [Abdomen Mass (___ Cm)] : no abdominal mass palpated [Abnormal Walk] : normal gait [Gait - Sufficient For Exercise Testing] : the gait was sufficient for exercise testing [Nail Clubbing] : no clubbing of the fingernails [Cyanosis, Localized] : no localized cyanosis [Petechial Hemorrhages (___cm)] : no petechial hemorrhages [] : no ischemic changes

## 2019-09-19 NOTE — REASON FOR VISIT
[FreeTextEntry1] : \par \par recent abn. stress echo--went just under 3 minutes;hypotensive response; nonaugmentation of septum and inferior wall;\par coronary angio--patent 3-vessel CABG (see cath. report)\par \par discomfort in lower chest when starting to walk (typically walks after dinner);\par sits down and waits for discomfort to reji; will resume walking w/o sx.'\par denies SOB; gets fatigued it lifting or carrying heavy objects;\par no PND or orthopnea

## 2019-09-19 NOTE — ASSESSMENT
[FreeTextEntry1] : \par \par \par ecg---SR (73); NSSTT abn.; NSCSPT\par \par imp----CAD---s/p CABG in 2006; chronic anginal chest discomfort w/o recent change; \par            coronary angio---as above; grafts patent; hypotensive response to exercise \par            MR---mod.-severe on Oct. 2018 echo with PASP=40%; LVEF=69%\par            TR---mod.-severe; probably secondary to pulmonary hypertension\par            pulmonary hypertension ; secondary to MR and diastolic dysfunction\par \par plan--increase metoprolol to 50 mg (succinate) daily\par          change timing of ISMN to AM and PM (not evening)\par          avoid walking after dinner; walk in late AM before lunch\par          OV with echo scheduled end of October\par          to report any change in frequency or severity of chest pain

## 2019-10-07 PROBLEM — E55.9 VITAMIN D DEFICIENCY: Status: ACTIVE | Noted: 2019-01-01

## 2019-10-07 NOTE — HISTORY OF PRESENT ILLNESS
[FreeTextEntry1] : CAD, Dementia, hypertension, hypercholesterol, high glucose, and osteopenia [de-identified] : no complaints\par

## 2019-10-07 NOTE — PHYSICAL EXAM
[No Acute Distress] : no acute distress [Well Nourished] : well nourished [Well Developed] : well developed [Well-Appearing] : well-appearing [Normal Outer Ear/Nose] : the outer ears and nose were normal in appearance [Supple] : supple [No Respiratory Distress] : no respiratory distress  [No Accessory Muscle Use] : no accessory muscle use [Clear to Auscultation] : lungs were clear to auscultation bilaterally [Normal Rate] : normal rate  [Regular Rhythm] : with a regular rhythm [Soft] : abdomen soft [No CVA Tenderness] : no CVA  tenderness [No Spinal Tenderness] : no spinal tenderness [Cyanosis] : no cyanosis [Abnormal Temperature] : normal temperature [Speech Grossly Normal] : speech grossly normal [Normal Affect] : the affect was normal [Normal Mood] : the mood was normal

## 2019-10-07 NOTE — ASSESSMENT
[FreeTextEntry1] : CAD-stable.  per card\par carotid art. dx.-stable\par dementia, history of CVA and seizure-stable.  cont. same med.  per neuro\par Continue same blood pressure medication.  Follow blood pressure.\par Continue same cholesterol medication.  Follow lipid.\par follow A1C\par osteopenia-DEXA

## 2020-01-01 ENCOUNTER — APPOINTMENT (OUTPATIENT)
Dept: INTERNAL MEDICINE | Facility: CLINIC | Age: 85
End: 2020-01-01
Payer: MEDICARE

## 2020-01-01 ENCOUNTER — LABORATORY RESULT (OUTPATIENT)
Age: 85
End: 2020-01-01

## 2020-01-01 ENCOUNTER — TRANSCRIPTION ENCOUNTER (OUTPATIENT)
Age: 85
End: 2020-01-01

## 2020-01-01 ENCOUNTER — INPATIENT (INPATIENT)
Facility: HOSPITAL | Age: 85
LOS: 7 days | Discharge: NURSING, REHAB OR LONG-TERM CARE | DRG: 65 | End: 2020-07-08
Attending: PSYCHIATRY & NEUROLOGY | Admitting: PSYCHIATRY & NEUROLOGY
Payer: MEDICARE

## 2020-01-01 ENCOUNTER — INPATIENT (INPATIENT)
Facility: HOSPITAL | Age: 85
LOS: 2 days | Discharge: HOME | DRG: 65 | End: 2020-07-26
Attending: STUDENT IN AN ORGANIZED HEALTH CARE EDUCATION/TRAINING PROGRAM | Admitting: INTERNAL MEDICINE
Payer: MEDICARE

## 2020-01-01 ENCOUNTER — APPOINTMENT (OUTPATIENT)
Dept: NEUROLOGY | Facility: CLINIC | Age: 85
End: 2020-01-01
Payer: MEDICARE

## 2020-01-01 ENCOUNTER — APPOINTMENT (OUTPATIENT)
Dept: MRI IMAGING | Facility: HOSPITAL | Age: 85
End: 2020-01-01

## 2020-01-01 ENCOUNTER — INPATIENT (INPATIENT)
Facility: HOSPITAL | Age: 85
LOS: 0 days | Discharge: ANOTHER HOSPITAL | DRG: 101 | End: 2020-06-30
Attending: INTERNAL MEDICINE | Admitting: INTERNAL MEDICINE
Payer: COMMERCIAL

## 2020-01-01 ENCOUNTER — APPOINTMENT (OUTPATIENT)
Dept: INTERNAL MEDICINE | Facility: CLINIC | Age: 85
End: 2020-01-01

## 2020-01-01 ENCOUNTER — INPATIENT (INPATIENT)
Facility: HOSPITAL | Age: 85
LOS: 13 days | Discharge: HOME HEALTH | DRG: 949 | End: 2020-07-22
Attending: PSYCHIATRY & NEUROLOGY | Admitting: PHYSICAL MEDICINE & REHABILITATION
Payer: MEDICARE

## 2020-01-01 ENCOUNTER — NON-APPOINTMENT (OUTPATIENT)
Age: 85
End: 2020-01-01

## 2020-01-01 VITALS
SYSTOLIC BLOOD PRESSURE: 124 MMHG | RESPIRATION RATE: 17 BRPM | DIASTOLIC BLOOD PRESSURE: 68 MMHG | TEMPERATURE: 98 F | HEART RATE: 85 BPM | WEIGHT: 130.07 LBS

## 2020-01-01 VITALS
HEART RATE: 82 BPM | TEMPERATURE: 98 F | DIASTOLIC BLOOD PRESSURE: 78 MMHG | OXYGEN SATURATION: 98 % | RESPIRATION RATE: 16 BRPM | SYSTOLIC BLOOD PRESSURE: 128 MMHG

## 2020-01-01 VITALS
WEIGHT: 145.51 LBS | SYSTOLIC BLOOD PRESSURE: 152 MMHG | TEMPERATURE: 97 F | RESPIRATION RATE: 15 BRPM | OXYGEN SATURATION: 99 % | DIASTOLIC BLOOD PRESSURE: 85 MMHG | HEART RATE: 92 BPM | HEIGHT: 63 IN

## 2020-01-01 VITALS
DIASTOLIC BLOOD PRESSURE: 56 MMHG | TEMPERATURE: 97 F | SYSTOLIC BLOOD PRESSURE: 87 MMHG | OXYGEN SATURATION: 99 % | HEART RATE: 73 BPM | RESPIRATION RATE: 20 BRPM

## 2020-01-01 VITALS
HEART RATE: 80 BPM | OXYGEN SATURATION: 96 % | RESPIRATION RATE: 18 BRPM | DIASTOLIC BLOOD PRESSURE: 80 MMHG | SYSTOLIC BLOOD PRESSURE: 130 MMHG | TEMPERATURE: 98 F

## 2020-01-01 VITALS
HEART RATE: 57 BPM | TEMPERATURE: 98 F | OXYGEN SATURATION: 99 % | SYSTOLIC BLOOD PRESSURE: 136 MMHG | HEIGHT: 67 IN | RESPIRATION RATE: 14 BRPM | WEIGHT: 130.07 LBS | DIASTOLIC BLOOD PRESSURE: 56 MMHG

## 2020-01-01 VITALS
HEIGHT: 67 IN | SYSTOLIC BLOOD PRESSURE: 110 MMHG | RESPIRATION RATE: 19 BRPM | TEMPERATURE: 97 F | WEIGHT: 139.99 LBS | OXYGEN SATURATION: 94 % | DIASTOLIC BLOOD PRESSURE: 80 MMHG | HEART RATE: 90 BPM

## 2020-01-01 VITALS
SYSTOLIC BLOOD PRESSURE: 105 MMHG | OXYGEN SATURATION: 94 % | HEART RATE: 69 BPM | TEMPERATURE: 98 F | DIASTOLIC BLOOD PRESSURE: 64 MMHG | RESPIRATION RATE: 14 BRPM

## 2020-01-01 VITALS
SYSTOLIC BLOOD PRESSURE: 120 MMHG | HEIGHT: 60 IN | WEIGHT: 144 LBS | BODY MASS INDEX: 28.27 KG/M2 | DIASTOLIC BLOOD PRESSURE: 80 MMHG

## 2020-01-01 DIAGNOSIS — I63.9 CEREBRAL INFARCTION, UNSPECIFIED: ICD-10-CM

## 2020-01-01 DIAGNOSIS — Z98.890 OTHER SPECIFIED POSTPROCEDURAL STATES: Chronic | ICD-10-CM

## 2020-01-01 DIAGNOSIS — E78.00 PURE HYPERCHOLESTEROLEMIA, UNSPECIFIED: ICD-10-CM

## 2020-01-01 DIAGNOSIS — I25.10 ATHEROSCLEROTIC HEART DISEASE OF NATIVE CORONARY ARTERY W/OUT ANGINA PECTORIS: ICD-10-CM

## 2020-01-01 DIAGNOSIS — R41.82 ALTERED MENTAL STATUS, UNSPECIFIED: ICD-10-CM

## 2020-01-01 DIAGNOSIS — R56.9 UNSPECIFIED CONVULSIONS: ICD-10-CM

## 2020-01-01 DIAGNOSIS — I63.323: ICD-10-CM

## 2020-01-01 DIAGNOSIS — I27.20 PULMONARY HYPERTENSION, UNSPECIFIED: ICD-10-CM

## 2020-01-01 DIAGNOSIS — Z71.89 OTHER SPECIFIED COUNSELING: ICD-10-CM

## 2020-01-01 DIAGNOSIS — I10 ESSENTIAL (PRIMARY) HYPERTENSION: ICD-10-CM

## 2020-01-01 DIAGNOSIS — F01.51 VASCULAR DEMENTIA, UNSPECIFIED SEVERITY, WITH BEHAVIORAL DISTURBANCE: ICD-10-CM

## 2020-01-01 DIAGNOSIS — R45.1 RESTLESSNESS AND AGITATION: ICD-10-CM

## 2020-01-01 DIAGNOSIS — M85.89 OTHER SPECIFIED DISORDERS OF BONE DENSITY AND STRUCTURE, MULTIPLE SITES: ICD-10-CM

## 2020-01-01 DIAGNOSIS — F03.90 UNSPECIFIED DEMENTIA W/OUT BEHAVIORAL DISTURBANCE: ICD-10-CM

## 2020-01-01 DIAGNOSIS — G40.909 EPILEPSY, UNSPECIFIED, NOT INTRACTABLE, WITHOUT STATUS EPILEPTICUS: ICD-10-CM

## 2020-01-01 DIAGNOSIS — I42.9 CARDIOMYOPATHY, UNSPECIFIED: ICD-10-CM

## 2020-01-01 DIAGNOSIS — I69.398 OTHER SEQUELAE OF CEREBRAL INFARCTION: ICD-10-CM

## 2020-01-01 DIAGNOSIS — R73.01 IMPAIRED FASTING GLUCOSE: ICD-10-CM

## 2020-01-01 LAB
-  AMIKACIN: SIGNIFICANT CHANGE UP
-  AMIKACIN: SIGNIFICANT CHANGE UP
-  AMOXICILLIN/CLAVULANIC ACID: SIGNIFICANT CHANGE UP
-  AMPICILLIN/SULBACTAM: SIGNIFICANT CHANGE UP
-  AMPICILLIN/SULBACTAM: SIGNIFICANT CHANGE UP
-  AMPICILLIN: SIGNIFICANT CHANGE UP
-  AMPICILLIN: SIGNIFICANT CHANGE UP
-  AZTREONAM: SIGNIFICANT CHANGE UP
-  AZTREONAM: SIGNIFICANT CHANGE UP
-  CEFAZOLIN: SIGNIFICANT CHANGE UP
-  CEFAZOLIN: SIGNIFICANT CHANGE UP
-  CEFEPIME: SIGNIFICANT CHANGE UP
-  CEFEPIME: SIGNIFICANT CHANGE UP
-  CEFOTAXIME: SIGNIFICANT CHANGE UP
-  CEFOXITIN: SIGNIFICANT CHANGE UP
-  CEFOXITIN: SIGNIFICANT CHANGE UP
-  CEFTAZIDIME: SIGNIFICANT CHANGE UP
-  CEFTRIAXONE: SIGNIFICANT CHANGE UP
-  CEFTRIAXONE: SIGNIFICANT CHANGE UP
-  CEFUROXIME: SIGNIFICANT CHANGE UP
-  CIPROFLOXACIN: SIGNIFICANT CHANGE UP
-  CIPROFLOXACIN: SIGNIFICANT CHANGE UP
-  ERTAPENEM: SIGNIFICANT CHANGE UP
-  GENTAMICIN: SIGNIFICANT CHANGE UP
-  GENTAMICIN: SIGNIFICANT CHANGE UP
-  IMIPENEM: SIGNIFICANT CHANGE UP
-  LEVOFLOXACIN: SIGNIFICANT CHANGE UP
-  LEVOFLOXACIN: SIGNIFICANT CHANGE UP
-  MEROPENEM: SIGNIFICANT CHANGE UP
-  MEROPENEM: SIGNIFICANT CHANGE UP
-  MINOCYCLINE: SIGNIFICANT CHANGE UP
-  NITROFURANTOIN: SIGNIFICANT CHANGE UP
-  NITROFURANTOIN: SIGNIFICANT CHANGE UP
-  PIPERACILLIN/TAZOBACTAM: SIGNIFICANT CHANGE UP
-  PIPERACILLIN/TAZOBACTAM: SIGNIFICANT CHANGE UP
-  TIGECYCLINE: SIGNIFICANT CHANGE UP
-  TIGECYCLINE: SIGNIFICANT CHANGE UP
-  TOBRAMYCIN: SIGNIFICANT CHANGE UP
-  TOBRAMYCIN: SIGNIFICANT CHANGE UP
-  TRIMETHOPRIM/SULFAMETHOXAZOLE: SIGNIFICANT CHANGE UP
-  TRIMETHOPRIM/SULFAMETHOXAZOLE: SIGNIFICANT CHANGE UP
A1C WITH ESTIMATED AVERAGE GLUCOSE RESULT: 5.9 % — HIGH (ref 4–5.6)
ALBUMIN SERPL ELPH-MCNC: 2.8 G/DL — LOW (ref 3.3–5)
ALBUMIN SERPL ELPH-MCNC: 2.9 G/DL — LOW (ref 3.3–5)
ALBUMIN SERPL ELPH-MCNC: 2.9 G/DL — LOW (ref 3.3–5)
ALBUMIN SERPL ELPH-MCNC: 3 G/DL — LOW (ref 3.3–5)
ALBUMIN SERPL ELPH-MCNC: 3.1 G/DL — LOW (ref 3.3–5)
ALBUMIN SERPL ELPH-MCNC: 3.3 G/DL — SIGNIFICANT CHANGE UP (ref 3.3–5)
ALBUMIN SERPL ELPH-MCNC: 3.8 G/DL — SIGNIFICANT CHANGE UP (ref 3.3–5)
ALBUMIN SERPL ELPH-MCNC: 3.8 G/DL — SIGNIFICANT CHANGE UP (ref 3.3–5)
ALBUMIN SERPL ELPH-MCNC: 3.9 G/DL — SIGNIFICANT CHANGE UP (ref 3.3–5)
ALP SERPL-CCNC: 105 U/L — SIGNIFICANT CHANGE UP (ref 40–120)
ALP SERPL-CCNC: 51 U/L — SIGNIFICANT CHANGE UP (ref 40–120)
ALP SERPL-CCNC: 51 U/L — SIGNIFICANT CHANGE UP (ref 40–120)
ALP SERPL-CCNC: 61 U/L — SIGNIFICANT CHANGE UP (ref 40–120)
ALP SERPL-CCNC: 65 U/L — SIGNIFICANT CHANGE UP (ref 40–120)
ALP SERPL-CCNC: 69 U/L — SIGNIFICANT CHANGE UP (ref 40–120)
ALP SERPL-CCNC: 80 U/L — SIGNIFICANT CHANGE UP (ref 40–120)
ALP SERPL-CCNC: 81 U/L — SIGNIFICANT CHANGE UP (ref 40–120)
ALP SERPL-CCNC: 87 U/L — SIGNIFICANT CHANGE UP (ref 40–120)
ALT FLD-CCNC: 18 U/L — SIGNIFICANT CHANGE UP (ref 10–45)
ALT FLD-CCNC: 19 U/L — SIGNIFICANT CHANGE UP (ref 10–45)
ALT FLD-CCNC: 28 U/L — SIGNIFICANT CHANGE UP (ref 10–45)
ALT FLD-CCNC: 32 U/L — SIGNIFICANT CHANGE UP (ref 10–45)
ALT FLD-CCNC: 32 U/L — SIGNIFICANT CHANGE UP (ref 10–45)
ALT FLD-CCNC: 35 U/L — SIGNIFICANT CHANGE UP (ref 10–45)
ALT FLD-CCNC: 36 U/L — SIGNIFICANT CHANGE UP (ref 10–45)
ALT FLD-CCNC: 46 U/L — HIGH (ref 10–45)
ALT FLD-CCNC: 53 U/L — HIGH (ref 10–45)
AMMONIA BLD-MCNC: 20 UMOL/L — SIGNIFICANT CHANGE UP (ref 11–55)
ANION GAP SERPL CALC-SCNC: 10 MMOL/L — SIGNIFICANT CHANGE UP (ref 5–17)
ANION GAP SERPL CALC-SCNC: 11 MMOL/L — SIGNIFICANT CHANGE UP (ref 5–17)
ANION GAP SERPL CALC-SCNC: 11 MMOL/L — SIGNIFICANT CHANGE UP (ref 5–17)
ANION GAP SERPL CALC-SCNC: 15 MMOL/L — SIGNIFICANT CHANGE UP (ref 5–17)
ANION GAP SERPL CALC-SCNC: 15 MMOL/L — SIGNIFICANT CHANGE UP (ref 5–17)
ANION GAP SERPL CALC-SCNC: 5 MMOL/L — SIGNIFICANT CHANGE UP (ref 5–17)
ANION GAP SERPL CALC-SCNC: 5 MMOL/L — SIGNIFICANT CHANGE UP (ref 5–17)
ANION GAP SERPL CALC-SCNC: 8 MMOL/L — SIGNIFICANT CHANGE UP (ref 5–17)
ANION GAP SERPL CALC-SCNC: 8 MMOL/L — SIGNIFICANT CHANGE UP (ref 5–17)
APPEARANCE UR: ABNORMAL
APPEARANCE UR: ABNORMAL
APPEARANCE UR: CLEAR — SIGNIFICANT CHANGE UP
APPEARANCE UR: CLEAR — SIGNIFICANT CHANGE UP
APTT BLD: 28.8 SEC — SIGNIFICANT CHANGE UP (ref 27.5–35.5)
APTT BLD: 30.6 SEC — SIGNIFICANT CHANGE UP (ref 27.5–35.5)
AST SERPL-CCNC: 18 U/L — SIGNIFICANT CHANGE UP (ref 10–40)
AST SERPL-CCNC: 22 U/L — SIGNIFICANT CHANGE UP (ref 10–40)
AST SERPL-CCNC: 22 U/L — SIGNIFICANT CHANGE UP (ref 10–40)
AST SERPL-CCNC: 35 U/L — SIGNIFICANT CHANGE UP (ref 10–40)
AST SERPL-CCNC: 36 U/L — SIGNIFICANT CHANGE UP (ref 10–40)
AST SERPL-CCNC: 38 U/L — SIGNIFICANT CHANGE UP (ref 10–40)
AST SERPL-CCNC: 40 U/L — SIGNIFICANT CHANGE UP (ref 10–40)
AST SERPL-CCNC: 42 U/L — HIGH (ref 10–40)
AST SERPL-CCNC: 62 U/L — HIGH (ref 10–40)
BACTERIA # UR AUTO: ABNORMAL /HPF
BACTERIA # UR AUTO: NEGATIVE /HPF — SIGNIFICANT CHANGE UP
BASOPHILS # BLD AUTO: 0 K/UL — SIGNIFICANT CHANGE UP (ref 0–0.2)
BASOPHILS # BLD AUTO: 0.01 K/UL — SIGNIFICANT CHANGE UP (ref 0–0.2)
BASOPHILS # BLD AUTO: 0.02 K/UL — SIGNIFICANT CHANGE UP (ref 0–0.2)
BASOPHILS # BLD AUTO: 0.03 K/UL — SIGNIFICANT CHANGE UP (ref 0–0.2)
BASOPHILS NFR BLD AUTO: 0 % — SIGNIFICANT CHANGE UP (ref 0–2)
BASOPHILS NFR BLD AUTO: 0.2 % — SIGNIFICANT CHANGE UP (ref 0–2)
BASOPHILS NFR BLD AUTO: 0.2 % — SIGNIFICANT CHANGE UP (ref 0–2)
BASOPHILS NFR BLD AUTO: 0.3 % — SIGNIFICANT CHANGE UP (ref 0–2)
BASOPHILS NFR BLD AUTO: 0.4 % — SIGNIFICANT CHANGE UP (ref 0–2)
BASOPHILS NFR BLD AUTO: 0.4 % — SIGNIFICANT CHANGE UP (ref 0–2)
BILIRUB SERPL-MCNC: 0.4 MG/DL — SIGNIFICANT CHANGE UP (ref 0.2–1.2)
BILIRUB SERPL-MCNC: 0.5 MG/DL — SIGNIFICANT CHANGE UP (ref 0.2–1.2)
BILIRUB SERPL-MCNC: 0.5 MG/DL — SIGNIFICANT CHANGE UP (ref 0.2–1.2)
BILIRUB SERPL-MCNC: 0.6 MG/DL — SIGNIFICANT CHANGE UP (ref 0.2–1.2)
BILIRUB SERPL-MCNC: 0.7 MG/DL — SIGNIFICANT CHANGE UP (ref 0.2–1.2)
BILIRUB SERPL-MCNC: 1 MG/DL — SIGNIFICANT CHANGE UP (ref 0.2–1.2)
BILIRUB SERPL-MCNC: 1.2 MG/DL — SIGNIFICANT CHANGE UP (ref 0.2–1.2)
BILIRUB UR-MCNC: NEGATIVE — SIGNIFICANT CHANGE UP
BUN SERPL-MCNC: 12 MG/DL — SIGNIFICANT CHANGE UP (ref 7–23)
BUN SERPL-MCNC: 15 MG/DL — SIGNIFICANT CHANGE UP (ref 7–23)
BUN SERPL-MCNC: 15 MG/DL — SIGNIFICANT CHANGE UP (ref 7–23)
BUN SERPL-MCNC: 18 MG/DL — SIGNIFICANT CHANGE UP (ref 7–23)
BUN SERPL-MCNC: 20 MG/DL — SIGNIFICANT CHANGE UP (ref 7–23)
BUN SERPL-MCNC: 21 MG/DL — SIGNIFICANT CHANGE UP (ref 7–23)
BUN SERPL-MCNC: 21 MG/DL — SIGNIFICANT CHANGE UP (ref 7–23)
BUN SERPL-MCNC: 22 MG/DL — SIGNIFICANT CHANGE UP (ref 7–23)
BUN SERPL-MCNC: 25 MG/DL — HIGH (ref 7–23)
BUN SERPL-MCNC: 26 MG/DL — HIGH (ref 7–23)
BUN SERPL-MCNC: 30 MG/DL — HIGH (ref 7–23)
CALCIUM SERPL-MCNC: 8.3 MG/DL — LOW (ref 8.4–10.5)
CALCIUM SERPL-MCNC: 8.5 MG/DL — SIGNIFICANT CHANGE UP (ref 8.4–10.5)
CALCIUM SERPL-MCNC: 8.9 MG/DL — SIGNIFICANT CHANGE UP (ref 8.4–10.5)
CALCIUM SERPL-MCNC: 8.9 MG/DL — SIGNIFICANT CHANGE UP (ref 8.4–10.5)
CALCIUM SERPL-MCNC: 9 MG/DL — SIGNIFICANT CHANGE UP (ref 8.4–10.5)
CALCIUM SERPL-MCNC: 9 MG/DL — SIGNIFICANT CHANGE UP (ref 8.4–10.5)
CALCIUM SERPL-MCNC: 9.1 MG/DL — SIGNIFICANT CHANGE UP (ref 8.4–10.5)
CALCIUM SERPL-MCNC: 9.2 MG/DL — SIGNIFICANT CHANGE UP (ref 8.4–10.5)
CALCIUM SERPL-MCNC: 9.5 MG/DL — SIGNIFICANT CHANGE UP (ref 8.4–10.5)
CHLORIDE SERPL-SCNC: 102 MMOL/L — SIGNIFICANT CHANGE UP (ref 96–108)
CHLORIDE SERPL-SCNC: 102 MMOL/L — SIGNIFICANT CHANGE UP (ref 96–108)
CHLORIDE SERPL-SCNC: 104 MMOL/L — SIGNIFICANT CHANGE UP (ref 96–108)
CHLORIDE SERPL-SCNC: 105 MMOL/L — SIGNIFICANT CHANGE UP (ref 96–108)
CHLORIDE SERPL-SCNC: 108 MMOL/L — SIGNIFICANT CHANGE UP (ref 96–108)
CHLORIDE SERPL-SCNC: 109 MMOL/L — HIGH (ref 96–108)
CHLORIDE SERPL-SCNC: 109 MMOL/L — HIGH (ref 96–108)
CHOLEST SERPL-MCNC: 115 MG/DL — SIGNIFICANT CHANGE UP (ref 10–199)
CHOLEST SERPL-MCNC: 122 MG/DL — SIGNIFICANT CHANGE UP (ref 10–199)
CK SERPL-CCNC: 63 U/L — SIGNIFICANT CHANGE UP (ref 25–170)
CK SERPL-CCNC: 64 U/L — SIGNIFICANT CHANGE UP (ref 25–170)
CK SERPL-CCNC: 70 U/L — SIGNIFICANT CHANGE UP (ref 25–170)
CK SERPL-CCNC: 99 U/L — SIGNIFICANT CHANGE UP (ref 25–170)
CO2 SERPL-SCNC: 20 MMOL/L — LOW (ref 22–31)
CO2 SERPL-SCNC: 20 MMOL/L — LOW (ref 22–31)
CO2 SERPL-SCNC: 22 MMOL/L — SIGNIFICANT CHANGE UP (ref 22–31)
CO2 SERPL-SCNC: 22 MMOL/L — SIGNIFICANT CHANGE UP (ref 22–31)
CO2 SERPL-SCNC: 24 MMOL/L — SIGNIFICANT CHANGE UP (ref 22–31)
CO2 SERPL-SCNC: 25 MMOL/L — SIGNIFICANT CHANGE UP (ref 22–31)
CO2 SERPL-SCNC: 26 MMOL/L — SIGNIFICANT CHANGE UP (ref 22–31)
CO2 SERPL-SCNC: 28 MMOL/L — SIGNIFICANT CHANGE UP (ref 22–31)
CO2 SERPL-SCNC: 29 MMOL/L — SIGNIFICANT CHANGE UP (ref 22–31)
CO2 SERPL-SCNC: 29 MMOL/L — SIGNIFICANT CHANGE UP (ref 22–31)
CO2 SERPL-SCNC: 31 MMOL/L — SIGNIFICANT CHANGE UP (ref 22–31)
COLOR SPEC: SIGNIFICANT CHANGE UP
COLOR SPEC: YELLOW — SIGNIFICANT CHANGE UP
COMMENT - URINE: SIGNIFICANT CHANGE UP
CREAT SERPL-MCNC: 0.66 MG/DL — SIGNIFICANT CHANGE UP (ref 0.5–1.3)
CREAT SERPL-MCNC: 0.75 MG/DL — SIGNIFICANT CHANGE UP (ref 0.5–1.3)
CREAT SERPL-MCNC: 0.76 MG/DL — SIGNIFICANT CHANGE UP (ref 0.5–1.3)
CREAT SERPL-MCNC: 0.78 MG/DL — SIGNIFICANT CHANGE UP (ref 0.5–1.3)
CREAT SERPL-MCNC: 0.8 MG/DL — SIGNIFICANT CHANGE UP (ref 0.5–1.3)
CREAT SERPL-MCNC: 0.8 MG/DL — SIGNIFICANT CHANGE UP (ref 0.5–1.3)
CREAT SERPL-MCNC: 0.81 MG/DL — SIGNIFICANT CHANGE UP (ref 0.5–1.3)
CREAT SERPL-MCNC: 0.87 MG/DL — SIGNIFICANT CHANGE UP (ref 0.5–1.3)
CREAT SERPL-MCNC: 0.93 MG/DL — SIGNIFICANT CHANGE UP (ref 0.5–1.3)
CREAT SERPL-MCNC: 0.93 MG/DL — SIGNIFICANT CHANGE UP (ref 0.5–1.3)
CREAT SERPL-MCNC: 1.22 MG/DL — SIGNIFICANT CHANGE UP (ref 0.5–1.3)
CRP SERPL-MCNC: <0.1 MG/DL — SIGNIFICANT CHANGE UP (ref 0–0.4)
CRP SERPL-MCNC: <0.1 MG/DL — SIGNIFICANT CHANGE UP (ref 0–0.4)
CULTURE RESULTS: NO GROWTH — SIGNIFICANT CHANGE UP
CULTURE RESULTS: NO GROWTH — SIGNIFICANT CHANGE UP
CULTURE RESULTS: SIGNIFICANT CHANGE UP
CULTURE RESULTS: SIGNIFICANT CHANGE UP
DIFF PNL FLD: ABNORMAL
DIFF PNL FLD: ABNORMAL
DIFF PNL FLD: NEGATIVE — SIGNIFICANT CHANGE UP
DIFF PNL FLD: NEGATIVE — SIGNIFICANT CHANGE UP
EOSINOPHIL # BLD AUTO: 0.01 K/UL — SIGNIFICANT CHANGE UP (ref 0–0.5)
EOSINOPHIL # BLD AUTO: 0.02 K/UL — SIGNIFICANT CHANGE UP (ref 0–0.5)
EOSINOPHIL # BLD AUTO: 0.04 K/UL — SIGNIFICANT CHANGE UP (ref 0–0.5)
EOSINOPHIL # BLD AUTO: 0.08 K/UL — SIGNIFICANT CHANGE UP (ref 0–0.5)
EOSINOPHIL NFR BLD AUTO: 0.2 % — SIGNIFICANT CHANGE UP (ref 0–6)
EOSINOPHIL NFR BLD AUTO: 0.3 % — SIGNIFICANT CHANGE UP (ref 0–6)
EOSINOPHIL NFR BLD AUTO: 0.4 % — SIGNIFICANT CHANGE UP (ref 0–6)
EOSINOPHIL NFR BLD AUTO: 0.5 % — SIGNIFICANT CHANGE UP (ref 0–6)
EOSINOPHIL NFR BLD AUTO: 1 % — SIGNIFICANT CHANGE UP (ref 0–6)
EOSINOPHIL NFR BLD AUTO: 1.3 % — SIGNIFICANT CHANGE UP (ref 0–6)
EPI CELLS # UR: ABNORMAL
EPI CELLS # UR: SIGNIFICANT CHANGE UP
ERYTHROCYTE [SEDIMENTATION RATE] IN BLOOD: 14 MM/HR — SIGNIFICANT CHANGE UP (ref 0–20)
ERYTHROCYTE [SEDIMENTATION RATE] IN BLOOD: 8 MM/HR — SIGNIFICANT CHANGE UP (ref 0–20)
ESTIMATED AVERAGE GLUCOSE: 123 MG/DL — HIGH (ref 68–114)
FOLATE SERPL-MCNC: >20 NG/ML — SIGNIFICANT CHANGE UP
GLUCOSE BLDC GLUCOMTR-MCNC: 108 MG/DL — HIGH (ref 70–99)
GLUCOSE SERPL-MCNC: 102 MG/DL — HIGH (ref 70–99)
GLUCOSE SERPL-MCNC: 106 MG/DL — HIGH (ref 70–99)
GLUCOSE SERPL-MCNC: 113 MG/DL — HIGH (ref 70–99)
GLUCOSE SERPL-MCNC: 135 MG/DL — HIGH (ref 70–99)
GLUCOSE SERPL-MCNC: 78 MG/DL — SIGNIFICANT CHANGE UP (ref 70–99)
GLUCOSE SERPL-MCNC: 82 MG/DL — SIGNIFICANT CHANGE UP (ref 70–99)
GLUCOSE SERPL-MCNC: 82 MG/DL — SIGNIFICANT CHANGE UP (ref 70–99)
GLUCOSE SERPL-MCNC: 86 MG/DL — SIGNIFICANT CHANGE UP (ref 70–99)
GLUCOSE SERPL-MCNC: 91 MG/DL — SIGNIFICANT CHANGE UP (ref 70–99)
GLUCOSE SERPL-MCNC: 93 MG/DL — SIGNIFICANT CHANGE UP (ref 70–99)
GLUCOSE SERPL-MCNC: 94 MG/DL — SIGNIFICANT CHANGE UP (ref 70–99)
GLUCOSE UR QL: NEGATIVE — SIGNIFICANT CHANGE UP
HBV CORE AB SER-ACNC: SIGNIFICANT CHANGE UP
HBV CORE IGM SER-ACNC: SIGNIFICANT CHANGE UP
HBV SURFACE AB SER-ACNC: <3 MIU/ML — LOW
HBV SURFACE AB SER-ACNC: SIGNIFICANT CHANGE UP
HBV SURFACE AG SER-ACNC: SIGNIFICANT CHANGE UP
HCT VFR BLD CALC: 34 % — LOW (ref 34.5–45)
HCT VFR BLD CALC: 34 % — LOW (ref 34.5–45)
HCT VFR BLD CALC: 34.7 % — SIGNIFICANT CHANGE UP (ref 34.5–45)
HCT VFR BLD CALC: 35.2 % — SIGNIFICANT CHANGE UP (ref 34.5–45)
HCT VFR BLD CALC: 35.7 % — SIGNIFICANT CHANGE UP (ref 34.5–45)
HCT VFR BLD CALC: 37.7 % — SIGNIFICANT CHANGE UP (ref 34.5–45)
HCT VFR BLD CALC: 38.3 % — SIGNIFICANT CHANGE UP (ref 34.5–45)
HCT VFR BLD CALC: 38.5 % — SIGNIFICANT CHANGE UP (ref 34.5–45)
HCT VFR BLD CALC: 38.5 % — SIGNIFICANT CHANGE UP (ref 34.5–45)
HCT VFR BLD CALC: 38.7 % — SIGNIFICANT CHANGE UP (ref 34.5–45)
HCT VFR BLD CALC: 39 % — SIGNIFICANT CHANGE UP (ref 34.5–45)
HCV AB S/CO SERPL IA: 0.12 S/CO — SIGNIFICANT CHANGE UP (ref 0–0.99)
HCV AB SERPL-IMP: SIGNIFICANT CHANGE UP
HDLC SERPL-MCNC: 38 MG/DL — LOW
HDLC SERPL-MCNC: 47 MG/DL — LOW
HGB BLD-MCNC: 11.2 G/DL — LOW (ref 11.5–15.5)
HGB BLD-MCNC: 11.4 G/DL — LOW (ref 11.5–15.5)
HGB BLD-MCNC: 11.6 G/DL — SIGNIFICANT CHANGE UP (ref 11.5–15.5)
HGB BLD-MCNC: 11.9 G/DL — SIGNIFICANT CHANGE UP (ref 11.5–15.5)
HGB BLD-MCNC: 12 G/DL — SIGNIFICANT CHANGE UP (ref 11.5–15.5)
HGB BLD-MCNC: 12.2 G/DL — SIGNIFICANT CHANGE UP (ref 11.5–15.5)
HGB BLD-MCNC: 12.7 G/DL — SIGNIFICANT CHANGE UP (ref 11.5–15.5)
HGB BLD-MCNC: 12.9 G/DL — SIGNIFICANT CHANGE UP (ref 11.5–15.5)
HGB BLD-MCNC: 12.9 G/DL — SIGNIFICANT CHANGE UP (ref 11.5–15.5)
HGB BLD-MCNC: 13 G/DL — SIGNIFICANT CHANGE UP (ref 11.5–15.5)
HGB BLD-MCNC: 13.1 G/DL — SIGNIFICANT CHANGE UP (ref 11.5–15.5)
IMM GRANULOCYTES NFR BLD AUTO: 0.2 % — SIGNIFICANT CHANGE UP (ref 0–1.5)
IMM GRANULOCYTES NFR BLD AUTO: 0.3 % — SIGNIFICANT CHANGE UP (ref 0–1.5)
IMM GRANULOCYTES NFR BLD AUTO: 0.6 % — SIGNIFICANT CHANGE UP (ref 0–1.5)
IMM GRANULOCYTES NFR BLD AUTO: 0.8 % — SIGNIFICANT CHANGE UP (ref 0–1.5)
IMM GRANULOCYTES NFR BLD AUTO: 0.9 % — SIGNIFICANT CHANGE UP (ref 0–1.5)
INR BLD: 1.03 RATIO — SIGNIFICANT CHANGE UP (ref 0.88–1.16)
INR BLD: 1.06 RATIO — SIGNIFICANT CHANGE UP (ref 0.88–1.16)
INR BLD: 1.11 RATIO — SIGNIFICANT CHANGE UP (ref 0.88–1.16)
KETONES UR-MCNC: ABNORMAL
KETONES UR-MCNC: ABNORMAL
KETONES UR-MCNC: NEGATIVE — SIGNIFICANT CHANGE UP
KETONES UR-MCNC: NEGATIVE — SIGNIFICANT CHANGE UP
LACTATE BLDV-MCNC: 1.3 MMOL/L — SIGNIFICANT CHANGE UP (ref 0.7–2)
LACTATE SERPL-SCNC: 1.6 MMOL/L — SIGNIFICANT CHANGE UP (ref 0.7–2)
LEUKOCYTE ESTERASE UR-ACNC: ABNORMAL
LEUKOCYTE ESTERASE UR-ACNC: ABNORMAL
LEUKOCYTE ESTERASE UR-ACNC: NEGATIVE — SIGNIFICANT CHANGE UP
LEUKOCYTE ESTERASE UR-ACNC: NEGATIVE — SIGNIFICANT CHANGE UP
LIPID PNL WITH DIRECT LDL SERPL: 53 MG/DL — SIGNIFICANT CHANGE UP
LIPID PNL WITH DIRECT LDL SERPL: 67 MG/DL — SIGNIFICANT CHANGE UP
LYMPHOCYTES # BLD AUTO: 1.58 K/UL — SIGNIFICANT CHANGE UP (ref 1–3.3)
LYMPHOCYTES # BLD AUTO: 1.73 K/UL — SIGNIFICANT CHANGE UP (ref 1–3.3)
LYMPHOCYTES # BLD AUTO: 1.84 K/UL — SIGNIFICANT CHANGE UP (ref 1–3.3)
LYMPHOCYTES # BLD AUTO: 1.97 K/UL — SIGNIFICANT CHANGE UP (ref 1–3.3)
LYMPHOCYTES # BLD AUTO: 2.15 K/UL — SIGNIFICANT CHANGE UP (ref 1–3.3)
LYMPHOCYTES # BLD AUTO: 2.73 K/UL — SIGNIFICANT CHANGE UP (ref 1–3.3)
LYMPHOCYTES # BLD AUTO: 25.9 % — SIGNIFICANT CHANGE UP (ref 13–44)
LYMPHOCYTES # BLD AUTO: 35.4 % — SIGNIFICANT CHANGE UP (ref 13–44)
LYMPHOCYTES # BLD AUTO: 41 % — SIGNIFICANT CHANGE UP (ref 13–44)
LYMPHOCYTES # BLD AUTO: 41.4 % — SIGNIFICANT CHANGE UP (ref 13–44)
LYMPHOCYTES # BLD AUTO: 43.1 % — SIGNIFICANT CHANGE UP (ref 13–44)
LYMPHOCYTES # BLD AUTO: 44.6 % — HIGH (ref 13–44)
MAGNESIUM SERPL-MCNC: 2.2 MG/DL — SIGNIFICANT CHANGE UP (ref 1.6–2.6)
MANUAL SMEAR VERIFICATION: SIGNIFICANT CHANGE UP
MANUAL SMEAR VERIFICATION: SIGNIFICANT CHANGE UP
MCHC RBC-ENTMCNC: 31 PG — SIGNIFICANT CHANGE UP (ref 27–34)
MCHC RBC-ENTMCNC: 31.2 PG — SIGNIFICANT CHANGE UP (ref 27–34)
MCHC RBC-ENTMCNC: 31.3 PG — SIGNIFICANT CHANGE UP (ref 27–34)
MCHC RBC-ENTMCNC: 31.6 PG — SIGNIFICANT CHANGE UP (ref 27–34)
MCHC RBC-ENTMCNC: 32 PG — SIGNIFICANT CHANGE UP (ref 27–34)
MCHC RBC-ENTMCNC: 32.1 PG — SIGNIFICANT CHANGE UP (ref 27–34)
MCHC RBC-ENTMCNC: 32.3 GM/DL — SIGNIFICANT CHANGE UP (ref 32–36)
MCHC RBC-ENTMCNC: 32.3 PG — SIGNIFICANT CHANGE UP (ref 27–34)
MCHC RBC-ENTMCNC: 32.4 GM/DL — SIGNIFICANT CHANGE UP (ref 32–36)
MCHC RBC-ENTMCNC: 32.4 PG — SIGNIFICANT CHANGE UP (ref 27–34)
MCHC RBC-ENTMCNC: 32.6 GM/DL — SIGNIFICANT CHANGE UP (ref 32–36)
MCHC RBC-ENTMCNC: 32.6 PG — SIGNIFICANT CHANGE UP (ref 27–34)
MCHC RBC-ENTMCNC: 32.7 PG — SIGNIFICANT CHANGE UP (ref 27–34)
MCHC RBC-ENTMCNC: 33 PG — SIGNIFICANT CHANGE UP (ref 27–34)
MCHC RBC-ENTMCNC: 33.3 GM/DL — SIGNIFICANT CHANGE UP (ref 32–36)
MCHC RBC-ENTMCNC: 33.5 GM/DL — SIGNIFICANT CHANGE UP (ref 32–36)
MCHC RBC-ENTMCNC: 33.9 GM/DL — SIGNIFICANT CHANGE UP (ref 32–36)
MCHC RBC-ENTMCNC: 33.9 GM/DL — SIGNIFICANT CHANGE UP (ref 32–36)
MCHC RBC-ENTMCNC: 34.1 GM/DL — SIGNIFICANT CHANGE UP (ref 32–36)
MCHC RBC-ENTMCNC: 34.1 GM/DL — SIGNIFICANT CHANGE UP (ref 32–36)
MCV RBC AUTO: 94.2 FL — SIGNIFICANT CHANGE UP (ref 80–100)
MCV RBC AUTO: 94.7 FL — SIGNIFICANT CHANGE UP (ref 80–100)
MCV RBC AUTO: 95.5 FL — SIGNIFICANT CHANGE UP (ref 80–100)
MCV RBC AUTO: 95.8 FL — SIGNIFICANT CHANGE UP (ref 80–100)
MCV RBC AUTO: 96 FL — SIGNIFICANT CHANGE UP (ref 80–100)
MCV RBC AUTO: 96.1 FL — SIGNIFICANT CHANGE UP (ref 80–100)
MCV RBC AUTO: 96.3 FL — SIGNIFICANT CHANGE UP (ref 80–100)
MCV RBC AUTO: 96.5 FL — SIGNIFICANT CHANGE UP (ref 80–100)
MCV RBC AUTO: 96.7 FL — SIGNIFICANT CHANGE UP (ref 80–100)
METHOD TYPE: SIGNIFICANT CHANGE UP
METHOD TYPE: SIGNIFICANT CHANGE UP
MONOCYTES # BLD AUTO: 0.58 K/UL — SIGNIFICANT CHANGE UP (ref 0–0.9)
MONOCYTES # BLD AUTO: 0.72 K/UL — SIGNIFICANT CHANGE UP (ref 0–0.9)
MONOCYTES # BLD AUTO: 0.82 K/UL — SIGNIFICANT CHANGE UP (ref 0–0.9)
MONOCYTES # BLD AUTO: 0.89 K/UL — SIGNIFICANT CHANGE UP (ref 0–0.9)
MONOCYTES # BLD AUTO: 0.98 K/UL — HIGH (ref 0–0.9)
MONOCYTES # BLD AUTO: 1.22 K/UL — HIGH (ref 0–0.9)
MONOCYTES NFR BLD AUTO: 11.8 % — SIGNIFICANT CHANGE UP (ref 2–14)
MONOCYTES NFR BLD AUTO: 13 % — SIGNIFICANT CHANGE UP (ref 2–14)
MONOCYTES NFR BLD AUTO: 15.8 % — HIGH (ref 2–14)
MONOCYTES NFR BLD AUTO: 17.1 % — HIGH (ref 2–14)
MONOCYTES NFR BLD AUTO: 21.1 % — HIGH (ref 2–14)
MONOCYTES NFR BLD AUTO: 21.4 % — HIGH (ref 2–14)
NEUTROPHILS # BLD AUTO: 1.27 K/UL — LOW (ref 1.8–7.4)
NEUTROPHILS # BLD AUTO: 1.55 K/UL — LOW (ref 1.8–7.4)
NEUTROPHILS # BLD AUTO: 2.02 K/UL — SIGNIFICANT CHANGE UP (ref 1.8–7.4)
NEUTROPHILS # BLD AUTO: 2.09 K/UL — SIGNIFICANT CHANGE UP (ref 1.8–7.4)
NEUTROPHILS # BLD AUTO: 3.7 K/UL — SIGNIFICANT CHANGE UP (ref 1.8–7.4)
NEUTROPHILS # BLD AUTO: 3.71 K/UL — SIGNIFICANT CHANGE UP (ref 1.8–7.4)
NEUTROPHILS NFR BLD AUTO: 32.7 % — LOW (ref 43–77)
NEUTROPHILS NFR BLD AUTO: 34 % — LOW (ref 43–77)
NEUTROPHILS NFR BLD AUTO: 40.3 % — LOW (ref 43–77)
NEUTROPHILS NFR BLD AUTO: 45 % — SIGNIFICANT CHANGE UP (ref 43–77)
NEUTROPHILS NFR BLD AUTO: 47.8 % — SIGNIFICANT CHANGE UP (ref 43–77)
NEUTROPHILS NFR BLD AUTO: 60.6 % — SIGNIFICANT CHANGE UP (ref 43–77)
NITRITE UR-MCNC: NEGATIVE — SIGNIFICANT CHANGE UP
NRBC # BLD: 0 /100 WBCS — SIGNIFICANT CHANGE UP (ref 0–0)
NRBC # BLD: 0 /100 — SIGNIFICANT CHANGE UP (ref 0–0)
ORGANISM # SPEC MICROSCOPIC CNT: SIGNIFICANT CHANGE UP
PH UR: 5 — SIGNIFICANT CHANGE UP (ref 5–8)
PH UR: 5.5 — SIGNIFICANT CHANGE UP (ref 5–8)
PH UR: 6.5 — SIGNIFICANT CHANGE UP (ref 5–8)
PH UR: 7 — SIGNIFICANT CHANGE UP (ref 5–8)
PHOSPHATE SERPL-MCNC: 3.2 MG/DL — SIGNIFICANT CHANGE UP (ref 2.5–4.5)
PLAT MORPH BLD: NORMAL — SIGNIFICANT CHANGE UP
PLAT MORPH BLD: NORMAL — SIGNIFICANT CHANGE UP
PLATELET # BLD AUTO: 110 K/UL — LOW (ref 150–400)
PLATELET # BLD AUTO: 117 K/UL — LOW (ref 150–400)
PLATELET # BLD AUTO: 125 K/UL — LOW (ref 150–400)
PLATELET # BLD AUTO: 134 K/UL — LOW (ref 150–400)
PLATELET # BLD AUTO: 141 K/UL — LOW (ref 150–400)
PLATELET # BLD AUTO: 147 K/UL — LOW (ref 150–400)
PLATELET # BLD AUTO: 157 K/UL — SIGNIFICANT CHANGE UP (ref 150–400)
PLATELET # BLD AUTO: 59 K/UL — LOW (ref 150–400)
PLATELET # BLD AUTO: 59 K/UL — LOW (ref 150–400)
PLATELET # BLD AUTO: 63 K/UL — LOW (ref 150–400)
PLATELET # BLD AUTO: 81 K/UL — LOW (ref 150–400)
POTASSIUM SERPL-MCNC: 3.5 MMOL/L — SIGNIFICANT CHANGE UP (ref 3.5–5.3)
POTASSIUM SERPL-MCNC: 3.6 MMOL/L — SIGNIFICANT CHANGE UP (ref 3.5–5.3)
POTASSIUM SERPL-MCNC: 3.7 MMOL/L — SIGNIFICANT CHANGE UP (ref 3.5–5.3)
POTASSIUM SERPL-MCNC: 3.9 MMOL/L — SIGNIFICANT CHANGE UP (ref 3.5–5.3)
POTASSIUM SERPL-MCNC: 4 MMOL/L — SIGNIFICANT CHANGE UP (ref 3.5–5.3)
POTASSIUM SERPL-MCNC: 4.1 MMOL/L — SIGNIFICANT CHANGE UP (ref 3.5–5.3)
POTASSIUM SERPL-MCNC: 4.2 MMOL/L — SIGNIFICANT CHANGE UP (ref 3.5–5.3)
POTASSIUM SERPL-MCNC: 4.5 MMOL/L — SIGNIFICANT CHANGE UP (ref 3.5–5.3)
POTASSIUM SERPL-MCNC: 4.9 MMOL/L — SIGNIFICANT CHANGE UP (ref 3.5–5.3)
POTASSIUM SERPL-MCNC: 6.4 MMOL/L — CRITICAL HIGH (ref 3.5–5.3)
POTASSIUM SERPL-SCNC: 3.5 MMOL/L — SIGNIFICANT CHANGE UP (ref 3.5–5.3)
POTASSIUM SERPL-SCNC: 3.6 MMOL/L — SIGNIFICANT CHANGE UP (ref 3.5–5.3)
POTASSIUM SERPL-SCNC: 3.7 MMOL/L — SIGNIFICANT CHANGE UP (ref 3.5–5.3)
POTASSIUM SERPL-SCNC: 3.9 MMOL/L — SIGNIFICANT CHANGE UP (ref 3.5–5.3)
POTASSIUM SERPL-SCNC: 4 MMOL/L — SIGNIFICANT CHANGE UP (ref 3.5–5.3)
POTASSIUM SERPL-SCNC: 4.1 MMOL/L — SIGNIFICANT CHANGE UP (ref 3.5–5.3)
POTASSIUM SERPL-SCNC: 4.2 MMOL/L — SIGNIFICANT CHANGE UP (ref 3.5–5.3)
POTASSIUM SERPL-SCNC: 4.5 MMOL/L — SIGNIFICANT CHANGE UP (ref 3.5–5.3)
POTASSIUM SERPL-SCNC: 4.9 MMOL/L — SIGNIFICANT CHANGE UP (ref 3.5–5.3)
POTASSIUM SERPL-SCNC: 6.4 MMOL/L — CRITICAL HIGH (ref 3.5–5.3)
PROT SERPL-MCNC: 5.8 G/DL — LOW (ref 6–8.3)
PROT SERPL-MCNC: 6.2 G/DL — SIGNIFICANT CHANGE UP (ref 6–8.3)
PROT SERPL-MCNC: 6.2 G/DL — SIGNIFICANT CHANGE UP (ref 6–8.3)
PROT SERPL-MCNC: 6.4 G/DL — SIGNIFICANT CHANGE UP (ref 6–8.3)
PROT SERPL-MCNC: 6.7 G/DL — SIGNIFICANT CHANGE UP (ref 6–8.3)
PROT SERPL-MCNC: 6.8 G/DL — SIGNIFICANT CHANGE UP (ref 6–8.3)
PROT SERPL-MCNC: 7.1 G/DL — SIGNIFICANT CHANGE UP (ref 6–8.3)
PROT SERPL-MCNC: 7.5 G/DL — SIGNIFICANT CHANGE UP (ref 6–8.3)
PROT SERPL-MCNC: 7.9 G/DL — SIGNIFICANT CHANGE UP (ref 6–8.3)
PROT UR-MCNC: 15
PROT UR-MCNC: 30 MG/DL
PROT UR-MCNC: ABNORMAL
PROT UR-MCNC: NEGATIVE — SIGNIFICANT CHANGE UP
PROTHROM AB SERPL-ACNC: 12.4 SEC — SIGNIFICANT CHANGE UP (ref 10.6–13.6)
PROTHROM AB SERPL-ACNC: 12.7 SEC — SIGNIFICANT CHANGE UP (ref 10.6–13.6)
PROTHROM AB SERPL-ACNC: 12.8 SEC — SIGNIFICANT CHANGE UP (ref 10.6–13.6)
PYRUVATE SERPL-MCNC: 0.82 MG/DL — SIGNIFICANT CHANGE UP (ref 0.3–1.5)
RBC # BLD: 3.53 M/UL — LOW (ref 3.8–5.2)
RBC # BLD: 3.61 M/UL — LOW (ref 3.8–5.2)
RBC # BLD: 3.61 M/UL — LOW (ref 3.8–5.2)
RBC # BLD: 3.64 M/UL — LOW (ref 3.8–5.2)
RBC # BLD: 3.77 M/UL — LOW (ref 3.8–5.2)
RBC # BLD: 3.9 M/UL — SIGNIFICANT CHANGE UP (ref 3.8–5.2)
RBC # BLD: 3.98 M/UL — SIGNIFICANT CHANGE UP (ref 3.8–5.2)
RBC # BLD: 3.99 M/UL — SIGNIFICANT CHANGE UP (ref 3.8–5.2)
RBC # BLD: 4.01 M/UL — SIGNIFICANT CHANGE UP (ref 3.8–5.2)
RBC # BLD: 4.03 M/UL — SIGNIFICANT CHANGE UP (ref 3.8–5.2)
RBC # BLD: 4.07 M/UL — SIGNIFICANT CHANGE UP (ref 3.8–5.2)
RBC # FLD: 12.4 % — SIGNIFICANT CHANGE UP (ref 10.3–14.5)
RBC # FLD: 12.5 % — SIGNIFICANT CHANGE UP (ref 10.3–14.5)
RBC # FLD: 12.5 % — SIGNIFICANT CHANGE UP (ref 10.3–14.5)
RBC # FLD: 12.6 % — SIGNIFICANT CHANGE UP (ref 10.3–14.5)
RBC # FLD: 12.7 % — SIGNIFICANT CHANGE UP (ref 10.3–14.5)
RBC # FLD: 12.8 % — SIGNIFICANT CHANGE UP (ref 10.3–14.5)
RBC # FLD: 12.9 % — SIGNIFICANT CHANGE UP (ref 10.3–14.5)
RBC # FLD: 13 % — SIGNIFICANT CHANGE UP (ref 10.3–14.5)
RBC # FLD: 13.1 % — SIGNIFICANT CHANGE UP (ref 10.3–14.5)
RBC BLD AUTO: NORMAL — SIGNIFICANT CHANGE UP
RBC BLD AUTO: SIGNIFICANT CHANGE UP
RBC CASTS # UR COMP ASSIST: ABNORMAL /HPF (ref 0–4)
RBC CASTS # UR COMP ASSIST: SIGNIFICANT CHANGE UP /HPF (ref 0–4)
RHEUMATOID FACT SERPL-ACNC: <10 IU/ML — SIGNIFICANT CHANGE UP (ref 0–13)
RHEUMATOID FACT SERPL-ACNC: <10 IU/ML — SIGNIFICANT CHANGE UP (ref 0–13)
SARS-COV-2 IGG SERPL QL IA: NEGATIVE — SIGNIFICANT CHANGE UP
SARS-COV-2 IGM SERPL IA-ACNC: 0.92 INDEX — SIGNIFICANT CHANGE UP
SARS-COV-2 RNA SPEC QL NAA+PROBE: SIGNIFICANT CHANGE UP
SODIUM SERPL-SCNC: 139 MMOL/L — SIGNIFICANT CHANGE UP (ref 135–145)
SODIUM SERPL-SCNC: 140 MMOL/L — SIGNIFICANT CHANGE UP (ref 135–145)
SODIUM SERPL-SCNC: 140 MMOL/L — SIGNIFICANT CHANGE UP (ref 135–145)
SODIUM SERPL-SCNC: 141 MMOL/L — SIGNIFICANT CHANGE UP (ref 135–145)
SODIUM SERPL-SCNC: 142 MMOL/L — SIGNIFICANT CHANGE UP (ref 135–145)
SODIUM SERPL-SCNC: 142 MMOL/L — SIGNIFICANT CHANGE UP (ref 135–145)
SODIUM SERPL-SCNC: 145 MMOL/L — SIGNIFICANT CHANGE UP (ref 135–145)
SP GR SPEC: 1.01 — SIGNIFICANT CHANGE UP (ref 1.01–1.02)
SP GR SPEC: 1.02 — SIGNIFICANT CHANGE UP (ref 1.01–1.02)
SPECIMEN SOURCE: SIGNIFICANT CHANGE UP
T4 AB SER-ACNC: 8 UG/DL — SIGNIFICANT CHANGE UP (ref 4.6–12)
TOTAL CHOLESTEROL/HDL RATIO MEASUREMENT: 2.5 RATIO — LOW (ref 3.3–7.1)
TOTAL CHOLESTEROL/HDL RATIO MEASUREMENT: 3.2 RATIO — LOW (ref 3.3–7.1)
TRIGL SERPL-MCNC: 75 MG/DL — SIGNIFICANT CHANGE UP (ref 10–149)
TRIGL SERPL-MCNC: 84 MG/DL — SIGNIFICANT CHANGE UP (ref 10–149)
TROPONIN I SERPL-MCNC: <.017 NG/ML — LOW (ref 0.02–0.06)
TROPONIN I SERPL-MCNC: <.017 NG/ML — LOW (ref 0.02–0.06)
TSH SERPL-MCNC: 0.77 UIU/ML — SIGNIFICANT CHANGE UP (ref 0.27–4.2)
UROBILINOGEN FLD QL: 1
UROBILINOGEN FLD QL: 4
UROBILINOGEN FLD QL: ABNORMAL
UROBILINOGEN FLD QL: NEGATIVE — SIGNIFICANT CHANGE UP
VALPROATE SERPL-MCNC: 77 UG/ML — SIGNIFICANT CHANGE UP (ref 50–100)
VALPROATE SERPL-MCNC: 93 UG/ML — SIGNIFICANT CHANGE UP (ref 50–100)
VIT B12 SERPL-MCNC: >2000 PG/ML — HIGH (ref 232–1245)
WBC # BLD: 3.88 K/UL — SIGNIFICANT CHANGE UP (ref 3.8–10.5)
WBC # BLD: 4.18 K/UL — SIGNIFICANT CHANGE UP (ref 3.8–10.5)
WBC # BLD: 4.49 K/UL — SIGNIFICANT CHANGE UP (ref 3.8–10.5)
WBC # BLD: 4.52 K/UL — SIGNIFICANT CHANGE UP (ref 3.8–10.5)
WBC # BLD: 4.57 K/UL — SIGNIFICANT CHANGE UP (ref 3.8–10.5)
WBC # BLD: 4.76 K/UL — SIGNIFICANT CHANGE UP (ref 3.8–10.5)
WBC # BLD: 5.19 K/UL — SIGNIFICANT CHANGE UP (ref 3.8–10.5)
WBC # BLD: 5.39 K/UL — SIGNIFICANT CHANGE UP (ref 3.8–10.5)
WBC # BLD: 5.54 K/UL — SIGNIFICANT CHANGE UP (ref 3.8–10.5)
WBC # BLD: 6.11 K/UL — SIGNIFICANT CHANGE UP (ref 3.8–10.5)
WBC # BLD: 7.72 K/UL — SIGNIFICANT CHANGE UP (ref 3.8–10.5)
WBC # FLD AUTO: 3.88 K/UL — SIGNIFICANT CHANGE UP (ref 3.8–10.5)
WBC # FLD AUTO: 4.18 K/UL — SIGNIFICANT CHANGE UP (ref 3.8–10.5)
WBC # FLD AUTO: 4.49 K/UL — SIGNIFICANT CHANGE UP (ref 3.8–10.5)
WBC # FLD AUTO: 4.52 K/UL — SIGNIFICANT CHANGE UP (ref 3.8–10.5)
WBC # FLD AUTO: 4.57 K/UL — SIGNIFICANT CHANGE UP (ref 3.8–10.5)
WBC # FLD AUTO: 4.76 K/UL — SIGNIFICANT CHANGE UP (ref 3.8–10.5)
WBC # FLD AUTO: 5.19 K/UL — SIGNIFICANT CHANGE UP (ref 3.8–10.5)
WBC # FLD AUTO: 5.39 K/UL — SIGNIFICANT CHANGE UP (ref 3.8–10.5)
WBC # FLD AUTO: 5.54 K/UL — SIGNIFICANT CHANGE UP (ref 3.8–10.5)
WBC # FLD AUTO: 6.11 K/UL — SIGNIFICANT CHANGE UP (ref 3.8–10.5)
WBC # FLD AUTO: 7.72 K/UL — SIGNIFICANT CHANGE UP (ref 3.8–10.5)
WBC UR QL: ABNORMAL /HPF (ref 0–5)
WBC UR QL: SIGNIFICANT CHANGE UP /HPF (ref 0–5)

## 2020-01-01 PROCEDURE — 99291 CRITICAL CARE FIRST HOUR: CPT | Mod: 25

## 2020-01-01 PROCEDURE — 84443 ASSAY THYROID STIM HORMONE: CPT

## 2020-01-01 PROCEDURE — 99285 EMERGENCY DEPT VISIT HI MDM: CPT | Mod: CS,GC

## 2020-01-01 PROCEDURE — 83605 ASSAY OF LACTIC ACID: CPT

## 2020-01-01 PROCEDURE — 80061 LIPID PANEL: CPT

## 2020-01-01 PROCEDURE — 84100 ASSAY OF PHOSPHORUS: CPT

## 2020-01-01 PROCEDURE — 90732 PPSV23 VACC 2 YRS+ SUBQ/IM: CPT

## 2020-01-01 PROCEDURE — 99232 SBSQ HOSP IP/OBS MODERATE 35: CPT

## 2020-01-01 PROCEDURE — 97112 NEUROMUSCULAR REEDUCATION: CPT

## 2020-01-01 PROCEDURE — 82550 ASSAY OF CK (CPK): CPT

## 2020-01-01 PROCEDURE — 95812 EEG 41-60 MINUTES: CPT

## 2020-01-01 PROCEDURE — 97167 OT EVAL HIGH COMPLEX 60 MIN: CPT

## 2020-01-01 PROCEDURE — 99233 SBSQ HOSP IP/OBS HIGH 50: CPT

## 2020-01-01 PROCEDURE — 93005 ELECTROCARDIOGRAM TRACING: CPT

## 2020-01-01 PROCEDURE — 85730 THROMBOPLASTIN TIME PARTIAL: CPT

## 2020-01-01 PROCEDURE — 96116 NUBHVL XM PHYS/QHP 1ST HR: CPT

## 2020-01-01 PROCEDURE — 99292 CRITICAL CARE ADDL 30 MIN: CPT | Mod: CS

## 2020-01-01 PROCEDURE — 90791 PSYCH DIAGNOSTIC EVALUATION: CPT

## 2020-01-01 PROCEDURE — 70450 CT HEAD/BRAIN W/O DYE: CPT | Mod: 26

## 2020-01-01 PROCEDURE — 99291 CRITICAL CARE FIRST HOUR: CPT | Mod: CS

## 2020-01-01 PROCEDURE — 99284 EMERGENCY DEPT VISIT MOD MDM: CPT

## 2020-01-01 PROCEDURE — 99292 CRITICAL CARE ADDL 30 MIN: CPT

## 2020-01-01 PROCEDURE — 97535 SELF CARE MNGMENT TRAINING: CPT

## 2020-01-01 PROCEDURE — 99232 SBSQ HOSP IP/OBS MODERATE 35: CPT | Mod: GC

## 2020-01-01 PROCEDURE — 87340 HEPATITIS B SURFACE AG IA: CPT

## 2020-01-01 PROCEDURE — 80164 ASSAY DIPROPYLACETIC ACD TOT: CPT

## 2020-01-01 PROCEDURE — 82746 ASSAY OF FOLIC ACID SERUM: CPT

## 2020-01-01 PROCEDURE — 77080 DXA BONE DENSITY AXIAL: CPT

## 2020-01-01 PROCEDURE — 93880 EXTRACRANIAL BILAT STUDY: CPT

## 2020-01-01 PROCEDURE — 85610 PROTHROMBIN TIME: CPT

## 2020-01-01 PROCEDURE — 80053 COMPREHEN METABOLIC PANEL: CPT

## 2020-01-01 PROCEDURE — G0009: CPT

## 2020-01-01 PROCEDURE — 99215 OFFICE O/P EST HI 40 MIN: CPT | Mod: 95

## 2020-01-01 PROCEDURE — 83036 HEMOGLOBIN GLYCOSYLATED A1C: CPT

## 2020-01-01 PROCEDURE — 36415 COLL VENOUS BLD VENIPUNCTURE: CPT

## 2020-01-01 PROCEDURE — 97163 PT EVAL HIGH COMPLEX 45 MIN: CPT

## 2020-01-01 PROCEDURE — 97116 GAIT TRAINING THERAPY: CPT

## 2020-01-01 PROCEDURE — 99285 EMERGENCY DEPT VISIT HI MDM: CPT

## 2020-01-01 PROCEDURE — 86704 HEP B CORE ANTIBODY TOTAL: CPT

## 2020-01-01 PROCEDURE — 99239 HOSP IP/OBS DSCHRG MGMT >30: CPT

## 2020-01-01 PROCEDURE — C9254: CPT

## 2020-01-01 PROCEDURE — 84132 ASSAY OF SERUM POTASSIUM: CPT

## 2020-01-01 PROCEDURE — U0003: CPT

## 2020-01-01 PROCEDURE — 86140 C-REACTIVE PROTEIN: CPT

## 2020-01-01 PROCEDURE — 81001 URINALYSIS AUTO W/SCOPE: CPT

## 2020-01-01 PROCEDURE — 80048 BASIC METABOLIC PNL TOTAL CA: CPT

## 2020-01-01 PROCEDURE — 93010 ELECTROCARDIOGRAM REPORT: CPT

## 2020-01-01 PROCEDURE — 70450 CT HEAD/BRAIN W/O DYE: CPT

## 2020-01-01 PROCEDURE — 93306 TTE W/DOPPLER COMPLETE: CPT

## 2020-01-01 PROCEDURE — 82140 ASSAY OF AMMONIA: CPT

## 2020-01-01 PROCEDURE — 99223 1ST HOSP IP/OBS HIGH 75: CPT

## 2020-01-01 PROCEDURE — 96374 THER/PROPH/DIAG INJ IV PUSH: CPT

## 2020-01-01 PROCEDURE — 97530 THERAPEUTIC ACTIVITIES: CPT

## 2020-01-01 PROCEDURE — 97166 OT EVAL MOD COMPLEX 45 MIN: CPT

## 2020-01-01 PROCEDURE — 95720 EEG PHY/QHP EA INCR W/VEEG: CPT

## 2020-01-01 PROCEDURE — 87186 SC STD MICRODIL/AGAR DIL: CPT

## 2020-01-01 PROCEDURE — 93306 TTE W/DOPPLER COMPLETE: CPT | Mod: 26

## 2020-01-01 PROCEDURE — 87086 URINE CULTURE/COLONY COUNT: CPT

## 2020-01-01 PROCEDURE — 85027 COMPLETE CBC AUTOMATED: CPT

## 2020-01-01 PROCEDURE — 99223 1ST HOSP IP/OBS HIGH 75: CPT | Mod: GC

## 2020-01-01 PROCEDURE — 86705 HEP B CORE ANTIBODY IGM: CPT

## 2020-01-01 PROCEDURE — 99238 HOSP IP/OBS DSCHRG MGMT 30/<: CPT

## 2020-01-01 PROCEDURE — 82962 GLUCOSE BLOOD TEST: CPT

## 2020-01-01 PROCEDURE — 70551 MRI BRAIN STEM W/O DYE: CPT | Mod: 26

## 2020-01-01 PROCEDURE — 92507 TX SP LANG VOICE COMM INDIV: CPT

## 2020-01-01 PROCEDURE — 86431 RHEUMATOID FACTOR QUANT: CPT

## 2020-01-01 PROCEDURE — 92610 EVALUATE SWALLOWING FUNCTION: CPT

## 2020-01-01 PROCEDURE — G0426: CPT | Mod: 95

## 2020-01-01 PROCEDURE — 96375 TX/PRO/DX INJ NEW DRUG ADDON: CPT

## 2020-01-01 PROCEDURE — 97161 PT EVAL LOW COMPLEX 20 MIN: CPT

## 2020-01-01 PROCEDURE — 81003 URINALYSIS AUTO W/O SCOPE: CPT

## 2020-01-01 PROCEDURE — 83735 ASSAY OF MAGNESIUM: CPT

## 2020-01-01 PROCEDURE — 71045 X-RAY EXAM CHEST 1 VIEW: CPT | Mod: 26

## 2020-01-01 PROCEDURE — 92523 SPEECH SOUND LANG COMPREHEN: CPT

## 2020-01-01 PROCEDURE — 84484 ASSAY OF TROPONIN QUANT: CPT

## 2020-01-01 PROCEDURE — 97162 PT EVAL MOD COMPLEX 30 MIN: CPT

## 2020-01-01 PROCEDURE — 51701 INSERT BLADDER CATHETER: CPT

## 2020-01-01 PROCEDURE — 86769 SARS-COV-2 COVID-19 ANTIBODY: CPT

## 2020-01-01 PROCEDURE — 70551 MRI BRAIN STEM W/O DYE: CPT

## 2020-01-01 PROCEDURE — 96365 THER/PROPH/DIAG IV INF INIT: CPT

## 2020-01-01 PROCEDURE — 95718 EEG PHYS/QHP 2-12 HR W/VEEG: CPT

## 2020-01-01 PROCEDURE — 85652 RBC SED RATE AUTOMATED: CPT

## 2020-01-01 PROCEDURE — 95715 VEEG EA 12-26HR INTMT MNTR: CPT

## 2020-01-01 PROCEDURE — 84436 ASSAY OF TOTAL THYROXINE: CPT

## 2020-01-01 PROCEDURE — 84210 ASSAY OF PYRUVATE: CPT

## 2020-01-01 PROCEDURE — 99214 OFFICE O/P EST MOD 30 MIN: CPT | Mod: 25

## 2020-01-01 PROCEDURE — 71045 X-RAY EXAM CHEST 1 VIEW: CPT

## 2020-01-01 PROCEDURE — 86803 HEPATITIS C AB TEST: CPT

## 2020-01-01 PROCEDURE — 97110 THERAPEUTIC EXERCISES: CPT

## 2020-01-01 PROCEDURE — 99285 EMERGENCY DEPT VISIT HI MDM: CPT | Mod: CS

## 2020-01-01 PROCEDURE — 99442: CPT

## 2020-01-01 PROCEDURE — 99222 1ST HOSP IP/OBS MODERATE 55: CPT

## 2020-01-01 PROCEDURE — 86706 HEP B SURFACE ANTIBODY: CPT

## 2020-01-01 PROCEDURE — 95712 VEEG 2-12 HR INTMT MNTR: CPT

## 2020-01-01 PROCEDURE — 99285 EMERGENCY DEPT VISIT HI MDM: CPT | Mod: 25

## 2020-01-01 PROCEDURE — 82607 VITAMIN B-12: CPT

## 2020-01-01 PROCEDURE — 93880 EXTRACRANIAL BILAT STUDY: CPT | Mod: 26

## 2020-01-01 RX ORDER — LISINOPRIL 2.5 MG/1
2.5 TABLET ORAL DAILY
Refills: 0 | Status: DISCONTINUED | OUTPATIENT
Start: 2020-01-01 | End: 2020-01-01

## 2020-01-01 RX ORDER — NOREPINEPHRINE BITARTRATE/D5W 8 MG/250ML
0.1 PLASTIC BAG, INJECTION (ML) INTRAVENOUS
Qty: 8 | Refills: 0 | Status: DISCONTINUED | OUTPATIENT
Start: 2020-01-01 | End: 2020-01-01

## 2020-01-01 RX ORDER — VALPROIC ACID (AS SODIUM SALT) 250 MG/5ML
250 SOLUTION, ORAL ORAL ONCE
Refills: 0 | Status: COMPLETED | OUTPATIENT
Start: 2020-01-01 | End: 2020-01-01

## 2020-01-01 RX ORDER — MEMANTINE HYDROCHLORIDE 10 MG/1
1 TABLET ORAL
Qty: 60 | Refills: 0
Start: 2020-01-01 | End: 2020-01-01

## 2020-01-01 RX ORDER — DIVALPROEX SODIUM 500 MG/1
500 TABLET, DELAYED RELEASE ORAL
Refills: 0 | Status: DISCONTINUED | OUTPATIENT
Start: 2020-01-01 | End: 2020-01-01

## 2020-01-01 RX ORDER — BRIVARACETAM 25 MG/1
100 TABLET, FILM COATED ORAL ONCE
Refills: 0 | Status: DISCONTINUED | OUTPATIENT
Start: 2020-01-01 | End: 2020-01-01

## 2020-01-01 RX ORDER — SODIUM CHLORIDE 9 MG/ML
1000 INJECTION INTRAMUSCULAR; INTRAVENOUS; SUBCUTANEOUS ONCE
Refills: 0 | Status: COMPLETED | OUTPATIENT
Start: 2020-01-01 | End: 2020-01-01

## 2020-01-01 RX ORDER — SODIUM CHLORIDE 9 MG/ML
1000 INJECTION INTRAMUSCULAR; INTRAVENOUS; SUBCUTANEOUS ONCE
Refills: 0 | Status: DISCONTINUED | OUTPATIENT
Start: 2020-01-01 | End: 2020-01-01

## 2020-01-01 RX ORDER — LACOSAMIDE 50 MG/1
75 TABLET ORAL EVERY 12 HOURS
Refills: 0 | Status: DISCONTINUED | OUTPATIENT
Start: 2020-01-01 | End: 2020-01-01

## 2020-01-01 RX ORDER — DIVALPROEX SODIUM 500 MG/1
250 TABLET, DELAYED RELEASE ORAL AT BEDTIME
Refills: 0 | Status: DISCONTINUED | OUTPATIENT
Start: 2020-01-01 | End: 2020-01-01

## 2020-01-01 RX ORDER — LACOSAMIDE 50 MG/1
100 TABLET ORAL ONCE
Refills: 0 | Status: DISCONTINUED | OUTPATIENT
Start: 2020-01-01 | End: 2020-01-01

## 2020-01-01 RX ORDER — METOPROLOL TARTRATE 50 MG
1 TABLET ORAL
Qty: 0 | Refills: 0 | DISCHARGE

## 2020-01-01 RX ORDER — LANOLIN ALCOHOL/MO/W.PET/CERES
1 CREAM (GRAM) TOPICAL
Qty: 0 | Refills: 0 | DISCHARGE
Start: 2020-01-01

## 2020-01-01 RX ORDER — CITALOPRAM 10 MG/1
10 TABLET, FILM COATED ORAL DAILY
Refills: 0 | Status: DISCONTINUED | OUTPATIENT
Start: 2020-01-01 | End: 2020-01-01

## 2020-01-01 RX ORDER — CIPROFLOXACIN LACTATE 400MG/40ML
250 VIAL (ML) INTRAVENOUS EVERY 12 HOURS
Refills: 0 | Status: DISCONTINUED | OUTPATIENT
Start: 2020-01-01 | End: 2020-01-01

## 2020-01-01 RX ORDER — MEMANTINE HYDROCHLORIDE 10 MG/1
1 TABLET ORAL
Qty: 0 | Refills: 0 | DISCHARGE
Start: 2020-01-01

## 2020-01-01 RX ORDER — ATORVASTATIN CALCIUM 80 MG/1
40 TABLET, FILM COATED ORAL AT BEDTIME
Refills: 0 | Status: DISCONTINUED | OUTPATIENT
Start: 2020-01-01 | End: 2020-01-01

## 2020-01-01 RX ORDER — METOPROLOL TARTRATE 50 MG
50 TABLET ORAL DAILY
Refills: 0 | Status: DISCONTINUED | OUTPATIENT
Start: 2020-01-01 | End: 2020-01-01

## 2020-01-01 RX ORDER — ENOXAPARIN SODIUM 100 MG/ML
40 INJECTION SUBCUTANEOUS EVERY 24 HOURS
Refills: 0 | Status: DISCONTINUED | OUTPATIENT
Start: 2020-01-01 | End: 2020-01-01

## 2020-01-01 RX ORDER — ACETAMINOPHEN 500 MG
650 TABLET ORAL EVERY 6 HOURS
Refills: 0 | Status: DISCONTINUED | OUTPATIENT
Start: 2020-01-01 | End: 2020-01-01

## 2020-01-01 RX ORDER — DIVALPROEX SODIUM 500 1/1
500 TABLET, EXTENDED RELEASE ORAL
Qty: 60 | Refills: 5 | Status: ACTIVE | COMMUNITY
Start: 2020-01-01 | End: 1900-01-01

## 2020-01-01 RX ORDER — DIVALPROEX SODIUM 500 MG/1
500 TABLET, DELAYED RELEASE ORAL DAILY
Refills: 0 | Status: DISCONTINUED | OUTPATIENT
Start: 2020-01-01 | End: 2020-01-01

## 2020-01-01 RX ORDER — MEMANTINE HYDROCHLORIDE 10 MG/1
5 TABLET ORAL
Refills: 0 | Status: DISCONTINUED | OUTPATIENT
Start: 2020-01-01 | End: 2020-01-01

## 2020-01-01 RX ORDER — SODIUM CHLORIDE 9 MG/ML
1000 INJECTION INTRAMUSCULAR; INTRAVENOUS; SUBCUTANEOUS
Refills: 0 | Status: COMPLETED | OUTPATIENT
Start: 2020-01-01 | End: 2020-01-01

## 2020-01-01 RX ORDER — CEFTRIAXONE 500 MG/1
1000 INJECTION, POWDER, FOR SOLUTION INTRAMUSCULAR; INTRAVENOUS EVERY 24 HOURS
Refills: 0 | Status: DISCONTINUED | OUTPATIENT
Start: 2020-01-01 | End: 2020-01-01

## 2020-01-01 RX ORDER — ASPIRIN/CALCIUM CARB/MAGNESIUM 324 MG
81 TABLET ORAL DAILY
Refills: 0 | Status: DISCONTINUED | OUTPATIENT
Start: 2020-01-01 | End: 2020-01-01

## 2020-01-01 RX ORDER — ATORVASTATIN CALCIUM 80 MG/1
40 TABLET, FILM COATED ORAL DAILY
Refills: 0 | Status: DISCONTINUED | OUTPATIENT
Start: 2020-01-01 | End: 2020-01-01

## 2020-01-01 RX ORDER — CEFTRIAXONE 500 MG/1
1000 INJECTION, POWDER, FOR SOLUTION INTRAMUSCULAR; INTRAVENOUS ONCE
Refills: 0 | Status: COMPLETED | OUTPATIENT
Start: 2020-01-01 | End: 2020-01-01

## 2020-01-01 RX ORDER — ESCITALOPRAM OXALATE 10 MG/1
5 TABLET, FILM COATED ORAL DAILY
Refills: 0 | Status: DISCONTINUED | OUTPATIENT
Start: 2020-01-01 | End: 2020-01-01

## 2020-01-01 RX ORDER — DIVALPROEX SODIUM 500 MG/1
4 TABLET, DELAYED RELEASE ORAL
Qty: 240 | Refills: 0
Start: 2020-01-01 | End: 2020-01-01

## 2020-01-01 RX ORDER — LACOSAMIDE 50 MG/1
50 TABLET ORAL
Refills: 0 | Status: DISCONTINUED | OUTPATIENT
Start: 2020-01-01 | End: 2020-01-01

## 2020-01-01 RX ORDER — DIVALPROEX SODIUM 500 MG/1
1 TABLET, DELAYED RELEASE ORAL
Qty: 30 | Refills: 0
Start: 2020-01-01 | End: 2020-01-01

## 2020-01-01 RX ORDER — LACOSAMIDE 50 MG/1
75 TABLET ORAL
Refills: 0 | Status: DISCONTINUED | OUTPATIENT
Start: 2020-01-01 | End: 2020-01-01

## 2020-01-01 RX ORDER — BRIVARACETAM 25 MG/1
50 TABLET, FILM COATED ORAL
Refills: 0 | Status: DISCONTINUED | OUTPATIENT
Start: 2020-01-01 | End: 2020-01-01

## 2020-01-01 RX ORDER — LACOSAMIDE 50 MG/1
100 TABLET ORAL
Refills: 0 | Status: DISCONTINUED | OUTPATIENT
Start: 2020-01-01 | End: 2020-01-01

## 2020-01-01 RX ORDER — MEMANTINE HYDROCHLORIDE 10 MG/1
5 TABLET ORAL DAILY
Refills: 0 | Status: DISCONTINUED | OUTPATIENT
Start: 2020-01-01 | End: 2020-01-01

## 2020-01-01 RX ORDER — ISOSORBIDE MONONITRATE 60 MG/1
1 TABLET, EXTENDED RELEASE ORAL
Qty: 0 | Refills: 0 | DISCHARGE

## 2020-01-01 RX ORDER — ESCITALOPRAM OXALATE 10 MG/1
1 TABLET, FILM COATED ORAL
Qty: 30 | Refills: 0
Start: 2020-01-01 | End: 2020-01-01

## 2020-01-01 RX ORDER — SODIUM CHLORIDE 9 MG/ML
1000 INJECTION INTRAMUSCULAR; INTRAVENOUS; SUBCUTANEOUS
Refills: 0 | Status: DISCONTINUED | OUTPATIENT
Start: 2020-01-01 | End: 2020-01-01

## 2020-01-01 RX ORDER — LACOSAMIDE 50 MG/1
1 TABLET ORAL
Qty: 0 | Refills: 0 | DISCHARGE
Start: 2020-01-01

## 2020-01-01 RX ORDER — CIPROFLOXACIN LACTATE 400MG/40ML
1 VIAL (ML) INTRAVENOUS
Qty: 8 | Refills: 0
Start: 2020-01-01 | End: 2020-01-01

## 2020-01-01 RX ORDER — ATORVASTATIN CALCIUM 80 MG/1
1 TABLET, FILM COATED ORAL
Qty: 30 | Refills: 0
Start: 2020-01-01 | End: 2020-01-01

## 2020-01-01 RX ORDER — METOPROLOL TARTRATE 50 MG
1 TABLET ORAL
Qty: 30 | Refills: 0
Start: 2020-01-01 | End: 2020-01-01

## 2020-01-01 RX ORDER — LACOSAMIDE 50 MG/1
1 TABLET ORAL
Qty: 4 | Refills: 0
Start: 2020-01-01 | End: 2020-01-01

## 2020-01-01 RX ORDER — ENOXAPARIN SODIUM 100 MG/ML
40 INJECTION SUBCUTANEOUS DAILY
Refills: 0 | Status: DISCONTINUED | OUTPATIENT
Start: 2020-01-01 | End: 2020-01-01

## 2020-01-01 RX ORDER — ATORVASTATIN CALCIUM 80 MG/1
1 TABLET, FILM COATED ORAL
Qty: 0 | Refills: 0 | DISCHARGE
Start: 2020-01-01

## 2020-01-01 RX ORDER — VALPROIC ACID (AS SODIUM SALT) 250 MG/5ML
250 SOLUTION, ORAL ORAL
Refills: 0 | Status: DISCONTINUED | OUTPATIENT
Start: 2020-01-01 | End: 2020-01-01

## 2020-01-01 RX ORDER — DIVALPROEX SODIUM 500 MG/1
4 TABLET, DELAYED RELEASE ORAL
Qty: 0 | Refills: 0 | DISCHARGE
Start: 2020-01-01

## 2020-01-01 RX ORDER — VALPROIC ACID (AS SODIUM SALT) 250 MG/5ML
500 SOLUTION, ORAL ORAL ONCE
Refills: 0 | Status: COMPLETED | OUTPATIENT
Start: 2020-01-01 | End: 2020-01-01

## 2020-01-01 RX ORDER — LISINOPRIL 2.5 MG/1
5 TABLET ORAL DAILY
Refills: 0 | Status: DISCONTINUED | OUTPATIENT
Start: 2020-01-01 | End: 2020-01-01

## 2020-01-01 RX ORDER — DIVALPROEX SODIUM 500 MG/1
500 TABLET, DELAYED RELEASE ORAL ONCE
Refills: 0 | Status: COMPLETED | OUTPATIENT
Start: 2020-01-01 | End: 2020-01-01

## 2020-01-01 RX ORDER — FOSPHENYTOIN 50 MG/ML
500 INJECTION INTRAMUSCULAR; INTRAVENOUS ONCE
Refills: 0 | Status: COMPLETED | OUTPATIENT
Start: 2020-01-01 | End: 2020-01-01

## 2020-01-01 RX ORDER — QUETIAPINE FUMARATE 25 MG/1
25 TABLET ORAL TWICE DAILY
Qty: 60 | Refills: 0 | Status: ACTIVE | COMMUNITY
Start: 2020-01-01 | End: 1900-01-01

## 2020-01-01 RX ORDER — QUETIAPINE FUMARATE 200 MG/1
12.5 TABLET, FILM COATED ORAL ONCE
Refills: 0 | Status: COMPLETED | OUTPATIENT
Start: 2020-01-01 | End: 2020-01-01

## 2020-01-01 RX ORDER — PANTOPRAZOLE SODIUM 20 MG/1
40 TABLET, DELAYED RELEASE ORAL
Refills: 0 | Status: DISCONTINUED | OUTPATIENT
Start: 2020-01-01 | End: 2020-01-01

## 2020-01-01 RX ORDER — DIVALPROEX SODIUM 500 MG/1
1 TABLET, DELAYED RELEASE ORAL
Qty: 0 | Refills: 0 | DISCHARGE
Start: 2020-01-01

## 2020-01-01 RX ORDER — LACOSAMIDE 50 MG/1
50 TABLET ORAL EVERY 12 HOURS
Refills: 0 | Status: DISCONTINUED | OUTPATIENT
Start: 2020-01-01 | End: 2020-01-01

## 2020-01-01 RX ORDER — METOPROLOL TARTRATE 50 MG
1 TABLET ORAL
Qty: 0 | Refills: 0 | DISCHARGE
Start: 2020-01-01

## 2020-01-01 RX ORDER — ESCITALOPRAM OXALATE 10 MG/1
1 TABLET, FILM COATED ORAL
Qty: 0 | Refills: 0 | DISCHARGE
Start: 2020-01-01

## 2020-01-01 RX ORDER — LANOLIN ALCOHOL/MO/W.PET/CERES
3 CREAM (GRAM) TOPICAL AT BEDTIME
Refills: 0 | Status: DISCONTINUED | OUTPATIENT
Start: 2020-01-01 | End: 2020-01-01

## 2020-01-01 RX ORDER — MEMANTINE HYDROCHLORIDE 10 MG/1
1 TABLET ORAL
Qty: 0 | Refills: 0 | DISCHARGE

## 2020-01-01 RX ORDER — LACOSAMIDE 50 MG/1
1 TABLET ORAL
Qty: 60 | Refills: 0
Start: 2020-01-01 | End: 2020-01-01

## 2020-01-01 RX ORDER — ASPIRIN/CALCIUM CARB/MAGNESIUM 324 MG
1 TABLET ORAL
Qty: 0 | Refills: 0 | DISCHARGE
Start: 2020-01-01

## 2020-01-01 RX ORDER — CITALOPRAM 10 MG/1
1 TABLET, FILM COATED ORAL
Qty: 0 | Refills: 0 | DISCHARGE

## 2020-01-01 RX ORDER — MULTIVIT-MIN/FERROUS GLUCONATE 9 MG/15 ML
1 LIQUID (ML) ORAL
Qty: 0 | Refills: 0 | DISCHARGE

## 2020-01-01 RX ORDER — LACOSAMIDE 100 MG/1
100 TABLET, FILM COATED ORAL
Qty: 60 | Refills: 3 | Status: ACTIVE | COMMUNITY
Start: 2020-01-01 | End: 1900-01-01

## 2020-01-01 RX ORDER — LEVETIRACETAM 250 MG/1
1000 TABLET, FILM COATED ORAL ONCE
Refills: 0 | Status: COMPLETED | OUTPATIENT
Start: 2020-01-01 | End: 2020-01-01

## 2020-01-01 RX ORDER — ATORVASTATIN CALCIUM 80 MG/1
1 TABLET, FILM COATED ORAL
Qty: 0 | Refills: 0 | DISCHARGE

## 2020-01-01 RX ORDER — CIPROFLOXACIN LACTATE 400MG/40ML
1 VIAL (ML) INTRAVENOUS
Qty: 0 | Refills: 0 | DISCHARGE
Start: 2020-01-01

## 2020-01-01 RX ORDER — OMEGA-3 ACID ETHYL ESTERS 1 G
2 CAPSULE ORAL DAILY
Refills: 0 | Status: DISCONTINUED | OUTPATIENT
Start: 2020-01-01 | End: 2020-01-01

## 2020-01-01 RX ORDER — OLANZAPINE 2.5 MG/1
2.5 TABLET, FILM COATED ORAL
Qty: 60 | Refills: 0 | Status: ACTIVE | COMMUNITY
Start: 2020-01-01 | End: 1900-01-01

## 2020-01-01 RX ORDER — ISOSORBIDE MONONITRATE 60 MG/1
0.5 TABLET, EXTENDED RELEASE ORAL
Qty: 0 | Refills: 0 | DISCHARGE

## 2020-01-01 RX ORDER — VALPROIC ACID (AS SODIUM SALT) 250 MG/5ML
250 SOLUTION, ORAL ORAL THREE TIMES A DAY
Refills: 0 | Status: DISCONTINUED | OUTPATIENT
Start: 2020-01-01 | End: 2020-01-01

## 2020-01-01 RX ORDER — VALPROIC ACID (AS SODIUM SALT) 250 MG/5ML
500 SOLUTION, ORAL ORAL
Refills: 0 | Status: DISCONTINUED | OUTPATIENT
Start: 2020-01-01 | End: 2020-01-01

## 2020-01-01 RX ADMIN — LACOSAMIDE 110 MILLIGRAM(S): 50 TABLET ORAL at 05:26

## 2020-01-01 RX ADMIN — Medication 81 MILLIGRAM(S): at 12:35

## 2020-01-01 RX ADMIN — DIVALPROEX SODIUM 500 MILLIGRAM(S): 500 TABLET, DELAYED RELEASE ORAL at 18:44

## 2020-01-01 RX ADMIN — LACOSAMIDE 115 MILLIGRAM(S): 50 TABLET ORAL at 17:31

## 2020-01-01 RX ADMIN — ENOXAPARIN SODIUM 40 MILLIGRAM(S): 100 INJECTION SUBCUTANEOUS at 20:10

## 2020-01-01 RX ADMIN — PANTOPRAZOLE SODIUM 40 MILLIGRAM(S): 20 TABLET, DELAYED RELEASE ORAL at 06:18

## 2020-01-01 RX ADMIN — CEFTRIAXONE 100 MILLIGRAM(S): 500 INJECTION, POWDER, FOR SOLUTION INTRAMUSCULAR; INTRAVENOUS at 23:58

## 2020-01-01 RX ADMIN — ATORVASTATIN CALCIUM 40 MILLIGRAM(S): 80 TABLET, FILM COATED ORAL at 20:45

## 2020-01-01 RX ADMIN — Medication 50 MILLIGRAM(S): at 05:26

## 2020-01-01 RX ADMIN — DIVALPROEX SODIUM 500 MILLIGRAM(S): 500 TABLET, DELAYED RELEASE ORAL at 17:01

## 2020-01-01 RX ADMIN — CITALOPRAM 10 MILLIGRAM(S): 10 TABLET, FILM COATED ORAL at 11:24

## 2020-01-01 RX ADMIN — CEFTRIAXONE 100 MILLIGRAM(S): 500 INJECTION, POWDER, FOR SOLUTION INTRAMUSCULAR; INTRAVENOUS at 00:07

## 2020-01-01 RX ADMIN — BRIVARACETAM 220 MILLIGRAM(S): 25 TABLET, FILM COATED ORAL at 08:13

## 2020-01-01 RX ADMIN — MEMANTINE HYDROCHLORIDE 5 MILLIGRAM(S): 10 TABLET ORAL at 18:17

## 2020-01-01 RX ADMIN — MEMANTINE HYDROCHLORIDE 5 MILLIGRAM(S): 10 TABLET ORAL at 17:05

## 2020-01-01 RX ADMIN — Medication 25 MILLIGRAM(S): at 05:31

## 2020-01-01 RX ADMIN — ATORVASTATIN CALCIUM 40 MILLIGRAM(S): 80 TABLET, FILM COATED ORAL at 20:36

## 2020-01-01 RX ADMIN — Medication 3 MILLIGRAM(S): at 21:41

## 2020-01-01 RX ADMIN — MEMANTINE HYDROCHLORIDE 5 MILLIGRAM(S): 10 TABLET ORAL at 17:35

## 2020-01-01 RX ADMIN — Medication 3 MILLIGRAM(S): at 21:03

## 2020-01-01 RX ADMIN — DIVALPROEX SODIUM 500 MILLIGRAM(S): 500 TABLET, DELAYED RELEASE ORAL at 18:55

## 2020-01-01 RX ADMIN — Medication 220 MILLIGRAM(S): at 06:39

## 2020-01-01 RX ADMIN — LACOSAMIDE 50 MILLIGRAM(S): 50 TABLET ORAL at 13:14

## 2020-01-01 RX ADMIN — CEFTRIAXONE 100 MILLIGRAM(S): 500 INJECTION, POWDER, FOR SOLUTION INTRAMUSCULAR; INTRAVENOUS at 22:14

## 2020-01-01 RX ADMIN — Medication 50 MILLIGRAM(S): at 05:24

## 2020-01-01 RX ADMIN — Medication 81 MILLIGRAM(S): at 11:24

## 2020-01-01 RX ADMIN — BRIVARACETAM 220 MILLIGRAM(S): 25 TABLET, FILM COATED ORAL at 12:46

## 2020-01-01 RX ADMIN — LACOSAMIDE 100 MILLIGRAM(S): 50 TABLET ORAL at 05:24

## 2020-01-01 RX ADMIN — ESCITALOPRAM OXALATE 5 MILLIGRAM(S): 10 TABLET, FILM COATED ORAL at 11:40

## 2020-01-01 RX ADMIN — ATORVASTATIN CALCIUM 40 MILLIGRAM(S): 80 TABLET, FILM COATED ORAL at 21:38

## 2020-01-01 RX ADMIN — Medication 50 MILLIGRAM(S): at 06:49

## 2020-01-01 RX ADMIN — MEMANTINE HYDROCHLORIDE 5 MILLIGRAM(S): 10 TABLET ORAL at 05:42

## 2020-01-01 RX ADMIN — PANTOPRAZOLE SODIUM 40 MILLIGRAM(S): 20 TABLET, DELAYED RELEASE ORAL at 06:50

## 2020-01-01 RX ADMIN — Medication 3 MILLIGRAM(S): at 21:10

## 2020-01-01 RX ADMIN — LEVETIRACETAM 400 MILLIGRAM(S): 250 TABLET, FILM COATED ORAL at 09:11

## 2020-01-01 RX ADMIN — MEMANTINE HYDROCHLORIDE 5 MILLIGRAM(S): 10 TABLET ORAL at 05:29

## 2020-01-01 RX ADMIN — Medication 3 MILLIGRAM(S): at 21:55

## 2020-01-01 RX ADMIN — BRIVARACETAM 1020 MILLIGRAM(S): 25 TABLET, FILM COATED ORAL at 17:04

## 2020-01-01 RX ADMIN — DIVALPROEX SODIUM 500 MILLIGRAM(S): 500 TABLET, DELAYED RELEASE ORAL at 11:36

## 2020-01-01 RX ADMIN — MEMANTINE HYDROCHLORIDE 5 MILLIGRAM(S): 10 TABLET ORAL at 05:37

## 2020-01-01 RX ADMIN — Medication 250 MILLIGRAM(S): at 06:49

## 2020-01-01 RX ADMIN — FOSPHENYTOIN 120 MILLIGRAM(S) PE: 50 INJECTION INTRAMUSCULAR; INTRAVENOUS at 10:39

## 2020-01-01 RX ADMIN — LACOSAMIDE 100 MILLIGRAM(S): 50 TABLET ORAL at 08:21

## 2020-01-01 RX ADMIN — Medication 250 MILLIGRAM(S): at 18:22

## 2020-01-01 RX ADMIN — LACOSAMIDE 100 MILLIGRAM(S): 50 TABLET ORAL at 05:50

## 2020-01-01 RX ADMIN — ENOXAPARIN SODIUM 40 MILLIGRAM(S): 100 INJECTION SUBCUTANEOUS at 11:42

## 2020-01-01 RX ADMIN — LACOSAMIDE 100 MILLIGRAM(S): 50 TABLET ORAL at 17:01

## 2020-01-01 RX ADMIN — Medication 81 MILLIGRAM(S): at 12:30

## 2020-01-01 RX ADMIN — Medication 2 GRAM(S): at 11:37

## 2020-01-01 RX ADMIN — ESCITALOPRAM OXALATE 5 MILLIGRAM(S): 10 TABLET, FILM COATED ORAL at 12:35

## 2020-01-01 RX ADMIN — Medication 81 MILLIGRAM(S): at 12:48

## 2020-01-01 RX ADMIN — MEMANTINE HYDROCHLORIDE 5 MILLIGRAM(S): 10 TABLET ORAL at 05:24

## 2020-01-01 RX ADMIN — MEMANTINE HYDROCHLORIDE 5 MILLIGRAM(S): 10 TABLET ORAL at 17:17

## 2020-01-01 RX ADMIN — MEMANTINE HYDROCHLORIDE 5 MILLIGRAM(S): 10 TABLET ORAL at 17:31

## 2020-01-01 RX ADMIN — ENOXAPARIN SODIUM 40 MILLIGRAM(S): 100 INJECTION SUBCUTANEOUS at 21:04

## 2020-01-01 RX ADMIN — MEMANTINE HYDROCHLORIDE 5 MILLIGRAM(S): 10 TABLET ORAL at 17:08

## 2020-01-01 RX ADMIN — ATORVASTATIN CALCIUM 40 MILLIGRAM(S): 80 TABLET, FILM COATED ORAL at 21:43

## 2020-01-01 RX ADMIN — BRIVARACETAM 220 MILLIGRAM(S): 25 TABLET, FILM COATED ORAL at 18:35

## 2020-01-01 RX ADMIN — Medication 50 MILLIGRAM(S): at 05:07

## 2020-01-01 RX ADMIN — DIVALPROEX SODIUM 500 MILLIGRAM(S): 500 TABLET, DELAYED RELEASE ORAL at 11:41

## 2020-01-01 RX ADMIN — CITALOPRAM 10 MILLIGRAM(S): 10 TABLET, FILM COATED ORAL at 11:07

## 2020-01-01 RX ADMIN — MEMANTINE HYDROCHLORIDE 5 MILLIGRAM(S): 10 TABLET ORAL at 18:44

## 2020-01-01 RX ADMIN — BRIVARACETAM 1020 MILLIGRAM(S): 25 TABLET, FILM COATED ORAL at 06:05

## 2020-01-01 RX ADMIN — LACOSAMIDE 115 MILLIGRAM(S): 50 TABLET ORAL at 15:27

## 2020-01-01 RX ADMIN — MEMANTINE HYDROCHLORIDE 5 MILLIGRAM(S): 10 TABLET ORAL at 17:49

## 2020-01-01 RX ADMIN — Medication 2 GRAM(S): at 12:34

## 2020-01-01 RX ADMIN — DIVALPROEX SODIUM 500 MILLIGRAM(S): 500 TABLET, DELAYED RELEASE ORAL at 05:25

## 2020-01-01 RX ADMIN — ENOXAPARIN SODIUM 40 MILLIGRAM(S): 100 INJECTION SUBCUTANEOUS at 22:13

## 2020-01-01 RX ADMIN — ATORVASTATIN CALCIUM 40 MILLIGRAM(S): 80 TABLET, FILM COATED ORAL at 23:03

## 2020-01-01 RX ADMIN — DIVALPROEX SODIUM 500 MILLIGRAM(S): 500 TABLET, DELAYED RELEASE ORAL at 05:56

## 2020-01-01 RX ADMIN — ATORVASTATIN CALCIUM 40 MILLIGRAM(S): 80 TABLET, FILM COATED ORAL at 21:19

## 2020-01-01 RX ADMIN — DIVALPROEX SODIUM 500 MILLIGRAM(S): 500 TABLET, DELAYED RELEASE ORAL at 17:05

## 2020-01-01 RX ADMIN — Medication 25 MILLIGRAM(S): at 00:07

## 2020-01-01 RX ADMIN — Medication 81 MILLIGRAM(S): at 11:37

## 2020-01-01 RX ADMIN — DIVALPROEX SODIUM 250 MILLIGRAM(S): 500 TABLET, DELAYED RELEASE ORAL at 21:38

## 2020-01-01 RX ADMIN — Medication 81 MILLIGRAM(S): at 11:47

## 2020-01-01 RX ADMIN — BRIVARACETAM 220 MILLIGRAM(S): 25 TABLET, FILM COATED ORAL at 20:57

## 2020-01-01 RX ADMIN — Medication 25 MILLIGRAM(S): at 15:48

## 2020-01-01 RX ADMIN — BRIVARACETAM 220 MILLIGRAM(S): 25 TABLET, FILM COATED ORAL at 05:29

## 2020-01-01 RX ADMIN — CEFTRIAXONE 100 MILLIGRAM(S): 500 INJECTION, POWDER, FOR SOLUTION INTRAMUSCULAR; INTRAVENOUS at 23:44

## 2020-01-01 RX ADMIN — Medication 25 MILLIGRAM(S): at 15:12

## 2020-01-01 RX ADMIN — LACOSAMIDE 115 MILLIGRAM(S): 50 TABLET ORAL at 12:46

## 2020-01-01 RX ADMIN — ENOXAPARIN SODIUM 40 MILLIGRAM(S): 100 INJECTION SUBCUTANEOUS at 18:20

## 2020-01-01 RX ADMIN — ENOXAPARIN SODIUM 40 MILLIGRAM(S): 100 INJECTION SUBCUTANEOUS at 21:14

## 2020-01-01 RX ADMIN — MEMANTINE HYDROCHLORIDE 5 MILLIGRAM(S): 10 TABLET ORAL at 18:39

## 2020-01-01 RX ADMIN — Medication 2 MILLIGRAM(S): at 08:15

## 2020-01-01 RX ADMIN — CEFTRIAXONE 100 MILLIGRAM(S): 500 INJECTION, POWDER, FOR SOLUTION INTRAMUSCULAR; INTRAVENOUS at 23:03

## 2020-01-01 RX ADMIN — LACOSAMIDE 115 MILLIGRAM(S): 50 TABLET ORAL at 20:05

## 2020-01-01 RX ADMIN — PANTOPRAZOLE SODIUM 40 MILLIGRAM(S): 20 TABLET, DELAYED RELEASE ORAL at 05:57

## 2020-01-01 RX ADMIN — Medication 81 MILLIGRAM(S): at 12:05

## 2020-01-01 RX ADMIN — LACOSAMIDE 115 MILLIGRAM(S): 50 TABLET ORAL at 14:19

## 2020-01-01 RX ADMIN — CITALOPRAM 10 MILLIGRAM(S): 10 TABLET, FILM COATED ORAL at 12:05

## 2020-01-01 RX ADMIN — LACOSAMIDE 115 MILLIGRAM(S): 50 TABLET ORAL at 13:40

## 2020-01-01 RX ADMIN — MEMANTINE HYDROCHLORIDE 5 MILLIGRAM(S): 10 TABLET ORAL at 06:08

## 2020-01-01 RX ADMIN — Medication 81 MILLIGRAM(S): at 22:12

## 2020-01-01 RX ADMIN — Medication 81 MILLIGRAM(S): at 15:28

## 2020-01-01 RX ADMIN — DIVALPROEX SODIUM 500 MILLIGRAM(S): 500 TABLET, DELAYED RELEASE ORAL at 05:42

## 2020-01-01 RX ADMIN — Medication 27.5 MILLIGRAM(S): at 23:03

## 2020-01-01 RX ADMIN — Medication 27.5 MILLIGRAM(S): at 21:18

## 2020-01-01 RX ADMIN — DIVALPROEX SODIUM 250 MILLIGRAM(S): 500 TABLET, DELAYED RELEASE ORAL at 21:19

## 2020-01-01 RX ADMIN — DIVALPROEX SODIUM 500 MILLIGRAM(S): 500 TABLET, DELAYED RELEASE ORAL at 17:29

## 2020-01-01 RX ADMIN — Medication 25 MILLIGRAM(S): at 07:38

## 2020-01-01 RX ADMIN — LACOSAMIDE 115 MILLIGRAM(S): 50 TABLET ORAL at 13:06

## 2020-01-01 RX ADMIN — CITALOPRAM 10 MILLIGRAM(S): 10 TABLET, FILM COATED ORAL at 12:46

## 2020-01-01 RX ADMIN — Medication 50 MILLIGRAM(S): at 05:42

## 2020-01-01 RX ADMIN — DIVALPROEX SODIUM 500 MILLIGRAM(S): 500 TABLET, DELAYED RELEASE ORAL at 06:08

## 2020-01-01 RX ADMIN — ATORVASTATIN CALCIUM 40 MILLIGRAM(S): 80 TABLET, FILM COATED ORAL at 22:12

## 2020-01-01 RX ADMIN — LACOSAMIDE 100 MILLIGRAM(S): 50 TABLET ORAL at 06:52

## 2020-01-01 RX ADMIN — ATORVASTATIN CALCIUM 40 MILLIGRAM(S): 80 TABLET, FILM COATED ORAL at 23:10

## 2020-01-01 RX ADMIN — SODIUM CHLORIDE 1000 MILLILITER(S): 9 INJECTION INTRAMUSCULAR; INTRAVENOUS; SUBCUTANEOUS at 12:00

## 2020-01-01 RX ADMIN — LACOSAMIDE 115 MILLIGRAM(S): 50 TABLET ORAL at 20:31

## 2020-01-01 RX ADMIN — ATORVASTATIN CALCIUM 40 MILLIGRAM(S): 80 TABLET, FILM COATED ORAL at 20:41

## 2020-01-01 RX ADMIN — MEMANTINE HYDROCHLORIDE 5 MILLIGRAM(S): 10 TABLET ORAL at 05:48

## 2020-01-01 RX ADMIN — ENOXAPARIN SODIUM 40 MILLIGRAM(S): 100 INJECTION SUBCUTANEOUS at 20:36

## 2020-01-01 RX ADMIN — ESCITALOPRAM OXALATE 5 MILLIGRAM(S): 10 TABLET, FILM COATED ORAL at 12:48

## 2020-01-01 RX ADMIN — CITALOPRAM 10 MILLIGRAM(S): 10 TABLET, FILM COATED ORAL at 13:14

## 2020-01-01 RX ADMIN — Medication 81 MILLIGRAM(S): at 11:41

## 2020-01-01 RX ADMIN — DIVALPROEX SODIUM 500 MILLIGRAM(S): 500 TABLET, DELAYED RELEASE ORAL at 18:17

## 2020-01-01 RX ADMIN — LACOSAMIDE 115 MILLIGRAM(S): 50 TABLET ORAL at 08:13

## 2020-01-01 RX ADMIN — LACOSAMIDE 100 MILLIGRAM(S): 50 TABLET ORAL at 04:30

## 2020-01-01 RX ADMIN — MEMANTINE HYDROCHLORIDE 5 MILLIGRAM(S): 10 TABLET ORAL at 17:29

## 2020-01-01 RX ADMIN — ATORVASTATIN CALCIUM 40 MILLIGRAM(S): 80 TABLET, FILM COATED ORAL at 21:25

## 2020-01-01 RX ADMIN — Medication 1 TABLET(S): at 12:48

## 2020-01-01 RX ADMIN — LACOSAMIDE 115 MILLIGRAM(S): 50 TABLET ORAL at 08:14

## 2020-01-01 RX ADMIN — LACOSAMIDE 115 MILLIGRAM(S): 50 TABLET ORAL at 20:37

## 2020-01-01 RX ADMIN — SODIUM CHLORIDE 1000 MILLILITER(S): 9 INJECTION INTRAMUSCULAR; INTRAVENOUS; SUBCUTANEOUS at 11:00

## 2020-01-01 RX ADMIN — Medication 250 MILLIGRAM(S): at 05:50

## 2020-01-01 RX ADMIN — CITALOPRAM 10 MILLIGRAM(S): 10 TABLET, FILM COATED ORAL at 15:28

## 2020-01-01 RX ADMIN — Medication 3 MILLIGRAM(S): at 21:30

## 2020-01-01 RX ADMIN — Medication 25 MILLIGRAM(S): at 05:37

## 2020-01-01 RX ADMIN — Medication 50 MILLIGRAM(S): at 06:02

## 2020-01-01 RX ADMIN — LISINOPRIL 5 MILLIGRAM(S): 2.5 TABLET ORAL at 06:16

## 2020-01-01 RX ADMIN — LACOSAMIDE 100 MILLIGRAM(S): 50 TABLET ORAL at 18:28

## 2020-01-01 RX ADMIN — BRIVARACETAM 220 MILLIGRAM(S): 25 TABLET, FILM COATED ORAL at 20:37

## 2020-01-01 RX ADMIN — LACOSAMIDE 100 MILLIGRAM(S): 50 TABLET ORAL at 06:08

## 2020-01-01 RX ADMIN — ATORVASTATIN CALCIUM 40 MILLIGRAM(S): 80 TABLET, FILM COATED ORAL at 21:03

## 2020-01-01 RX ADMIN — LISINOPRIL 2.5 MILLIGRAM(S): 2.5 TABLET ORAL at 06:00

## 2020-01-01 RX ADMIN — BRIVARACETAM 220 MILLIGRAM(S): 25 TABLET, FILM COATED ORAL at 07:36

## 2020-01-01 RX ADMIN — Medication 50 MILLIGRAM(S): at 05:23

## 2020-01-01 RX ADMIN — ESCITALOPRAM OXALATE 5 MILLIGRAM(S): 10 TABLET, FILM COATED ORAL at 11:47

## 2020-01-01 RX ADMIN — LACOSAMIDE 115 MILLIGRAM(S): 50 TABLET ORAL at 05:07

## 2020-01-01 RX ADMIN — SODIUM CHLORIDE 1000 MILLILITER(S): 9 INJECTION INTRAMUSCULAR; INTRAVENOUS; SUBCUTANEOUS at 11:51

## 2020-01-01 RX ADMIN — Medication 2 GRAM(S): at 11:41

## 2020-01-01 RX ADMIN — ATORVASTATIN CALCIUM 40 MILLIGRAM(S): 80 TABLET, FILM COATED ORAL at 21:12

## 2020-01-01 RX ADMIN — MEMANTINE HYDROCHLORIDE 5 MILLIGRAM(S): 10 TABLET ORAL at 05:50

## 2020-01-01 RX ADMIN — Medication 1 TABLET(S): at 11:47

## 2020-01-01 RX ADMIN — LACOSAMIDE 115 MILLIGRAM(S): 50 TABLET ORAL at 07:55

## 2020-01-01 RX ADMIN — Medication 25 MILLIGRAM(S): at 10:43

## 2020-01-01 RX ADMIN — Medication 81 MILLIGRAM(S): at 13:14

## 2020-01-01 RX ADMIN — LACOSAMIDE 100 MILLIGRAM(S): 50 TABLET ORAL at 17:05

## 2020-01-01 RX ADMIN — Medication 3 MILLIGRAM(S): at 21:56

## 2020-01-01 RX ADMIN — MEMANTINE HYDROCHLORIDE 5 MILLIGRAM(S): 10 TABLET ORAL at 18:22

## 2020-01-01 RX ADMIN — ENOXAPARIN SODIUM 40 MILLIGRAM(S): 100 INJECTION SUBCUTANEOUS at 22:12

## 2020-01-01 RX ADMIN — LACOSAMIDE 100 MILLIGRAM(S): 50 TABLET ORAL at 18:17

## 2020-01-01 RX ADMIN — Medication 50 MILLIGRAM(S): at 07:38

## 2020-01-01 RX ADMIN — LEVETIRACETAM 1000 MILLIGRAM(S): 250 TABLET, FILM COATED ORAL at 09:26

## 2020-01-01 RX ADMIN — Medication 3 MILLIGRAM(S): at 21:08

## 2020-01-01 RX ADMIN — Medication 50 MILLIGRAM(S): at 05:29

## 2020-01-01 RX ADMIN — CITALOPRAM 10 MILLIGRAM(S): 10 TABLET, FILM COATED ORAL at 22:12

## 2020-01-01 RX ADMIN — MEMANTINE HYDROCHLORIDE 5 MILLIGRAM(S): 10 TABLET ORAL at 16:53

## 2020-01-01 RX ADMIN — SODIUM CHLORIDE 85 MILLILITER(S): 9 INJECTION INTRAMUSCULAR; INTRAVENOUS; SUBCUTANEOUS at 18:19

## 2020-01-01 RX ADMIN — Medication 250 MILLIGRAM(S): at 05:46

## 2020-01-01 RX ADMIN — ENOXAPARIN SODIUM 40 MILLIGRAM(S): 100 INJECTION SUBCUTANEOUS at 12:34

## 2020-01-01 RX ADMIN — SODIUM CHLORIDE 1000 MILLILITER(S): 9 INJECTION INTRAMUSCULAR; INTRAVENOUS; SUBCUTANEOUS at 19:42

## 2020-01-01 RX ADMIN — QUETIAPINE FUMARATE 12.5 MILLIGRAM(S): 200 TABLET, FILM COATED ORAL at 12:27

## 2020-01-01 RX ADMIN — LACOSAMIDE 100 MILLIGRAM(S): 50 TABLET ORAL at 05:42

## 2020-01-01 RX ADMIN — CEFTRIAXONE 1000 MILLIGRAM(S): 500 INJECTION, POWDER, FOR SOLUTION INTRAMUSCULAR; INTRAVENOUS at 18:31

## 2020-01-01 RX ADMIN — BRIVARACETAM 240 MILLIGRAM(S): 25 TABLET, FILM COATED ORAL at 11:24

## 2020-01-01 RX ADMIN — LISINOPRIL 2.5 MILLIGRAM(S): 2.5 TABLET ORAL at 05:47

## 2020-01-01 RX ADMIN — LACOSAMIDE 100 MILLIGRAM(S): 50 TABLET ORAL at 22:13

## 2020-01-01 RX ADMIN — DIVALPROEX SODIUM 500 MILLIGRAM(S): 500 TABLET, DELAYED RELEASE ORAL at 21:11

## 2020-01-01 RX ADMIN — LACOSAMIDE 100 MILLIGRAM(S): 50 TABLET ORAL at 17:44

## 2020-01-01 RX ADMIN — Medication 50 MILLIGRAM(S): at 05:55

## 2020-01-01 RX ADMIN — Medication 25 MILLIGRAM(S): at 13:02

## 2020-01-01 RX ADMIN — MEMANTINE HYDROCHLORIDE 5 MILLIGRAM(S): 10 TABLET ORAL at 11:24

## 2020-01-01 RX ADMIN — Medication 3 MILLIGRAM(S): at 21:38

## 2020-01-01 RX ADMIN — Medication 50 MILLIGRAM(S): at 05:37

## 2020-01-01 RX ADMIN — Medication 3 MILLIGRAM(S): at 23:10

## 2020-01-01 RX ADMIN — ATORVASTATIN CALCIUM 40 MILLIGRAM(S): 80 TABLET, FILM COATED ORAL at 22:00

## 2020-01-01 RX ADMIN — BRIVARACETAM 220 MILLIGRAM(S): 25 TABLET, FILM COATED ORAL at 14:19

## 2020-01-01 RX ADMIN — Medication 50 MILLIGRAM(S): at 05:50

## 2020-01-01 RX ADMIN — QUETIAPINE FUMARATE 12.5 MILLIGRAM(S): 200 TABLET, FILM COATED ORAL at 01:10

## 2020-01-01 RX ADMIN — ENOXAPARIN SODIUM 40 MILLIGRAM(S): 100 INJECTION SUBCUTANEOUS at 21:43

## 2020-01-01 RX ADMIN — ENOXAPARIN SODIUM 40 MILLIGRAM(S): 100 INJECTION SUBCUTANEOUS at 21:25

## 2020-01-01 RX ADMIN — MEMANTINE HYDROCHLORIDE 5 MILLIGRAM(S): 10 TABLET ORAL at 05:55

## 2020-01-01 RX ADMIN — MEMANTINE HYDROCHLORIDE 5 MILLIGRAM(S): 10 TABLET ORAL at 05:07

## 2020-01-01 RX ADMIN — LACOSAMIDE 110 MILLIGRAM(S): 50 TABLET ORAL at 18:19

## 2020-01-01 RX ADMIN — ENOXAPARIN SODIUM 40 MILLIGRAM(S): 100 INJECTION SUBCUTANEOUS at 12:30

## 2020-01-01 RX ADMIN — LACOSAMIDE 100 MILLIGRAM(S): 50 TABLET ORAL at 18:22

## 2020-01-01 RX ADMIN — Medication 250 MILLIGRAM(S): at 17:05

## 2020-01-01 RX ADMIN — MEMANTINE HYDROCHLORIDE 5 MILLIGRAM(S): 10 TABLET ORAL at 06:49

## 2020-01-01 RX ADMIN — MEMANTINE HYDROCHLORIDE 5 MILLIGRAM(S): 10 TABLET ORAL at 17:01

## 2020-01-01 RX ADMIN — CITALOPRAM 10 MILLIGRAM(S): 10 TABLET, FILM COATED ORAL at 12:45

## 2020-01-01 RX ADMIN — LACOSAMIDE 100 MILLIGRAM(S): 50 TABLET ORAL at 22:32

## 2020-01-01 RX ADMIN — ENOXAPARIN SODIUM 40 MILLIGRAM(S): 100 INJECTION SUBCUTANEOUS at 12:48

## 2020-01-01 RX ADMIN — Medication 81 MILLIGRAM(S): at 12:46

## 2020-01-01 RX ADMIN — Medication 50 MILLIGRAM(S): at 06:08

## 2020-01-01 RX ADMIN — Medication 1 TABLET(S): at 12:30

## 2020-01-01 RX ADMIN — Medication 250 MILLIGRAM(S): at 17:49

## 2020-01-01 RX ADMIN — ESCITALOPRAM OXALATE 5 MILLIGRAM(S): 10 TABLET, FILM COATED ORAL at 12:30

## 2020-01-01 RX ADMIN — LACOSAMIDE 115 MILLIGRAM(S): 50 TABLET ORAL at 08:00

## 2020-01-01 RX ADMIN — MEMANTINE HYDROCHLORIDE 5 MILLIGRAM(S): 10 TABLET ORAL at 05:57

## 2020-01-01 RX ADMIN — Medication 81 MILLIGRAM(S): at 11:07

## 2020-01-01 RX ADMIN — ESCITALOPRAM OXALATE 5 MILLIGRAM(S): 10 TABLET, FILM COATED ORAL at 11:37

## 2020-01-01 RX ADMIN — LACOSAMIDE 115 MILLIGRAM(S): 50 TABLET ORAL at 20:57

## 2020-01-01 RX ADMIN — Medication 25 MILLIGRAM(S): at 15:40

## 2020-01-01 RX ADMIN — Medication 250 MILLIGRAM(S): at 05:57

## 2020-01-01 RX ADMIN — Medication 3 MILLIGRAM(S): at 21:19

## 2020-01-01 RX ADMIN — LACOSAMIDE 115 MILLIGRAM(S): 50 TABLET ORAL at 07:40

## 2020-01-01 RX ADMIN — Medication 81 MILLIGRAM(S): at 12:45

## 2020-01-01 RX ADMIN — LACOSAMIDE 100 MILLIGRAM(S): 50 TABLET ORAL at 05:57

## 2020-01-01 RX ADMIN — LACOSAMIDE 115 MILLIGRAM(S): 50 TABLET ORAL at 21:25

## 2020-01-01 RX ADMIN — ENOXAPARIN SODIUM 40 MILLIGRAM(S): 100 INJECTION SUBCUTANEOUS at 11:37

## 2020-01-01 RX ADMIN — MEMANTINE HYDROCHLORIDE 5 MILLIGRAM(S): 10 TABLET ORAL at 07:38

## 2020-01-01 RX ADMIN — Medication 50 MILLIGRAM(S): at 05:48

## 2020-01-01 RX ADMIN — CEFTRIAXONE 100 MILLIGRAM(S): 500 INJECTION, POWDER, FOR SOLUTION INTRAMUSCULAR; INTRAVENOUS at 18:00

## 2020-01-01 RX ADMIN — ATORVASTATIN CALCIUM 40 MILLIGRAM(S): 80 TABLET, FILM COATED ORAL at 22:13

## 2020-01-01 RX ADMIN — ENOXAPARIN SODIUM 40 MILLIGRAM(S): 100 INJECTION SUBCUTANEOUS at 11:47

## 2020-02-05 PROBLEM — I10 HYPERTENSION: Status: ACTIVE | Noted: 2017-08-31

## 2020-02-05 PROBLEM — E78.00 HYPERCHOLESTEROLEMIA: Status: ACTIVE | Noted: 2018-12-04

## 2020-02-05 PROBLEM — I27.20 PULMONARY HYPERTENSION: Status: ACTIVE | Noted: 2019-01-01

## 2020-02-05 PROBLEM — Z71.89 ADVANCE CARE PLANNING: Status: ACTIVE | Noted: 2018-01-24

## 2020-02-05 PROBLEM — I42.9 CARDIOMYOPATHY: Status: ACTIVE | Noted: 2019-01-01

## 2020-02-05 PROBLEM — M85.89 OSTEOPENIA OF MULTIPLE SITES: Status: ACTIVE | Noted: 2019-01-01

## 2020-02-05 NOTE — PHYSICAL EXAM
[Well Developed] : well developed [No Acute Distress] : no acute distress [Well Nourished] : well nourished [Supple] : supple [Well-Appearing] : well-appearing [Normal Outer Ear/Nose] : the outer ears and nose were normal in appearance [No Accessory Muscle Use] : no accessory muscle use [No Respiratory Distress] : no respiratory distress  [Clear to Auscultation] : lungs were clear to auscultation bilaterally [Normal Rate] : normal rate  [Regular Rhythm] : with a regular rhythm [Non Tender] : non-tender [Soft] : abdomen soft [No Edema] : there was no peripheral edema [Normal Posterior Cervical Nodes] : no posterior cervical lymphadenopathy [Normal Supraclavicular Nodes] : no supraclavicular lymphadenopathy [No CVA Tenderness] : no CVA  tenderness [Normal Anterior Cervical Nodes] : no anterior cervical lymphadenopathy [Cyanosis] : no cyanosis [No Spinal Tenderness] : no spinal tenderness [Abnormal Temperature] : normal temperature [Normal Gait] : normal gait [Coordination Grossly Intact] : coordination grossly intact [Normal Mood] : the mood was normal [Speech Grossly Normal] : speech grossly normal [Normal Affect] : the affect was normal

## 2020-02-05 NOTE — HISTORY OF PRESENT ILLNESS
[de-identified] : no complaints\par  [FreeTextEntry1] : CAD, hypertension, hypercholesterol, high glucose, and dementia

## 2020-02-05 NOTE — ASSESSMENT
[FreeTextEntry1] : CAD-stable.  per card\par dementia-per neuro.  cont. same med.\par Continue same blood pressure medication.  Follow blood pressure.\par Continue same cholesterol medication.  Follow lipid.\par follow A1C\par pneumovax

## 2020-06-30 NOTE — STROKE CODE NOTE - SUBJECTIVE
86-year-old right-handed white lady first evaluated by tele-stroke at [facility] on 6/30/2020 after a left-sided focal seizure.  Exam.  NIHSS = 24 (after IV Ativan).  Eyes closed-no opening to noxious stimuli; no verbal output; follows no commands; no movement of any extremities.  No grimace to noxious stimuli.  CT head (6/30/2020) to my eye showed multiple chronic infarcts: Large right MCA; small left MCA in the frontal region; bilateral PCA in the occipital regions.  Impression.  She has a history of seizures, but none since 2012 at which time her Keppra was stopped.  About 12 years prior to this evaluation, was found to have right carotid stenosis and underwent right carotid endarterectomy.  About 7 years prior to this evaluation, she had recurrent episodes of scintillating scotomata and possibly at that time was found to have an acute stroke.

## 2020-06-30 NOTE — STROKE CODE NOTE - ASSESSMENT/PLAN
CT head showed multiple chronic infarcts.  Her presentation is consistent with occipital and probably right hemispheric dysfunction.  Etiology uncertain but suspect most likely seizures and no clear evidence of recurrent stroke.  Given the likely diagnosis, excluded from IV TPA and endovascular thrombectomy.  Suggest.  Further management as per [facility] team.

## 2020-06-30 NOTE — ED PROVIDER NOTE - OBJECTIVE STATEMENT
86 year old female PMH breast CA s/p lumpectomy and RT, CAD w/ CABGx5, HLD, TIA/CVA, carotid endarterectomy, seizures not on AEDs w/ last seizure 2012, dementia, presents as transfer. Per daughter, patient was home today w/ acute onset of visual hallucinations. Patient noted to have unsteady gait and left sided focal seizure. Patient seen at [facility] w/ labs and CT head showing extensive chronic ischemic changes and multiple old infarcts in both hemispheres. No obvious acute territorial infarct or hemorrhagic lesion. Patient transferred to Dr. Plummer for MRI and video EEG. Of note, patient received 2mg Ativan at [facility] and became hypotensive w/ reduced LOC. Patient currently pleasant w/o complaints. Denies f/c, cp, sob, abd pain, weakness, or numbness/tingling.

## 2020-06-30 NOTE — ED PROVIDER NOTE - CLINICAL SUMMARY MEDICAL DECISION MAKING FREE TEXT BOX
86F presents with EMS from home where she was noted by family to have onset of seizures as of this morning at 6AM. The family has been trying to keep the patient out of the hospital. 1.5weeks ago, the patient had onset of leftward gaze deviation, no weakness. They consulted with [facility] services where she was evaluated and they agreed on conservative management. The patient was awake and alert despite such deviation of gaze. She did not have any weakness of hand  or extremities. She was also ambulatory. This morning at 6AM, the patient had onset of seizures in addition. Noted to have involuntary twitching of the left upper and lower extremities. She was brought in by EMS with active seizure activity although awake and alert.   Ativan extinguished the observed left sided upper and lower extremity tonic clonic seizure. Leftward gaze deviation.   CT brain with old infarcts. No acute findings. Keppra 1000mg load given IV. Dr Libman called in via TeleStroke. Recc MR, etc (see above).  During ED stay, pt began to develop very slight involuntary muscle movement of the right side. Pt arousable but lethargic (from ativan). She is following simple commands. We contacted transfer center as pt may require 24 hr EEG service which we do not provide here at [facility]. Pt pcp Ángel Floyd and Neuro Dr Phoenix Lawrence are at [facility] as well.

## 2020-06-30 NOTE — ED ADULT NURSE NOTE - OBJECTIVE STATEMENT
Pt is an 87 y/o female brought in by EMS transfer from [facility] to [facility] for eeg monitoring r/t focal seizures. Had history of focal seizures but has not had one since 2012. Family lives with pt, noticed pt was acting off/lethargic this morning when they brought her to hospital. As per EMS pt was given 2mg of ativan for seizure activity which dropped BP so 1800ml of normal saline was given which improved BP. Denies CP, SOB, N/V/D, numbness, tingling, cough, fever, chills, dizziness, weakness, headache. Pt is an 85 y/o female brought in by EMS transfer from [facility] to [facility] for eeg monitoring r/t focal seizures. Had history of focal seizures but has not had one since 2012. Family lives with pt, noticed pt was acting off/lethargic this morning when they brought her to hospital. As per EMS pt was given 2mg of ativan for seizure activity which dropped BP so 1800ml of normal saline was given which improved BP. Denies CP, SOB, N/V/D, numbness, tingling, cough, fever, chills, dizziness, weakness, headache. patient daughter at bedside with patient. patient is aaox3, lethargic, pupils 2mm bilaterally, strenght and sensation is weak to upper and lower extremeties bilaterally, abd soft, nondistended, nontender, cap refill <3, radial pulses +2 bilaterally. patient comfort and safety provided. will continue to monitor. MD at bedside.

## 2020-06-30 NOTE — ED PROVIDER NOTE - CONSULTANT FREE TEXT FOR MDM DISCUSSED CASE WITH QUESTION
Dr ROD Lawrence called - no answer  Dr Williamson called - She says she isn't on call  Tele Stroke called - Dr Libman assessed via cart and does not recc TPA. Recc admission, mri, agrees with keppra use. Dr ROD Lawrence called - no answer  Dr Williamson called - She says she isn't on call  Tele Stroke called - Dr Libman assessed via cart and does not recc TPA. Recc admission, mri, eeg, agrees with keppra use.

## 2020-06-30 NOTE — ED ADULT TRIAGE NOTE - CHIEF COMPLAINT QUOTE
Pt BIB EMS from home actively seizing.  Per daughter pt had a seizure at 6:30 AM. Last well known 10:30 PM. No facial droop noted, Hep Lock LA placed by EMS. PMHX: Seizures, CVA

## 2020-06-30 NOTE — ED PROVIDER NOTE - CARE PLAN
Principal Discharge DX:	CVA, old, disturbances of vision  Secondary Diagnosis:	New onset seizure with abnormal neurological exam without head trauma  Secondary Diagnosis:	Left hemiparesis

## 2020-06-30 NOTE — ED ADULT NURSE NOTE - PSH
H/O lumpectomy  Left breast  S/P CABG x 5  2006  S/P carotid endarterectomy  right, 2006  S/P tonsillectomy

## 2020-06-30 NOTE — H&P ADULT - NSICDXPASTMEDICALHX_GEN_ALL_CORE_FT
PAST MEDICAL HISTORY:  Breast cancer     CAD (coronary artery disease)     Dementia     HLD (hyperlipidemia)     Peripheral vision loss     Seizure     TIA (transient ischemic attack)

## 2020-06-30 NOTE — ED PROVIDER NOTE - NS ED ROS FT
GENERAL: no fever, no chills  EYES: no change in vision  HEENT: no trouble swallowing or speaking  CARDIAC: no chest pain, no palpitations   PULMONARY: no cough, no shortness of breath, no wheezing  GI: no abdominal pain, no nausea, no vomiting, no diarrhea, no constipation  : no changes in urination, no dysuria  SKIN: no rashes  NEURO: no headache, no numbness, no weakness, +seizure  MSK: no joint pain, no muscle pain, no back pain, no calf pain   PSYCH: +visual hallucinations, no auditory hallucinations    -Salvador Murillo, PGY2

## 2020-06-30 NOTE — H&P ADULT - HISTORY OF PRESENT ILLNESS
83 yo f with history of TIA/CVA, seizure disorder (recently weaned off Keppra), CAD, multiple bypass tx from [place] for concern for seizures. As per family, patient has been feeling "off" since yesterday, saying things that don't make sense. May have had another event which did not resolve. As per provider note, patient being weaned off Keppra since mid november for seizures which occurred 4 years prior. 83 yo woman with history of multiple strokes of unknown etiology (w/u inc ILR negative), hx of seizures (multiple GTCs in 2012, was on keppra but off all AEDs for past 4 years), CAD, multiple bypass tx on ASA 81 from [place] for concern for seizures. As per family, patient has been feeling "off" since yesterday, saying things that don't make sense. May have had another event which did not resolve. As per provider note, patient being weaned off Keppra since mid november for seizures which occurred 4 years prior. 81 yo woman with history of multiple strokes of unknown etiology (w/u inc ILR negative follows with Dr. Lawrence), carotid endarterectomy, mild-mod cognitive dysfx but can be AAOx2-3, hx of seizures (multiple GTCs in 2012, was on keppra but taken off all AEDs for past 4 years), CAD, multiple bypass tx on ASA 81 from [place] for concern for seizures. As per family, patient has been feeling "off" for about 1 week, with intermittent visual hallucinations (sees animals and scintillating scotomas), saying things that don't make sense, also having L head turning and staring spells. Per daughter patient was home today had acute onset of visual hallucinations noted to have unsteady gait and left sided focal seizure. 81 yo woman with history of multiple strokes of unknown etiology (w/u inc ILR negative follows with Dr. Lawrence), carotid endarterectomy, mild-mod cognitive dysfx but can be AAOx2-3, hx of seizures (multiple GTCs in 2012, was on keppra but taken off all AEDs for past 4 years), CAD, multiple bypass tx on ASA 81 from [place] for concern for seizures. As per family, patient has been feeling "off" for about 1 week, with intermittent visual hallucinations (sees animals and scintillating scotomas), saying things that don't make sense, also having L head turning and staring spells. Per daughter patient was home today had acute onset of visual hallucinations noted to have unsteady gait and left sided focal shaking. Per daughter, prior to 1 week pt was independently ambulating and doing "yoga routines." Taken to [place], where CTH with chronic infarcts. Given Ativan 2mg IVP for left sided shaking became lethargic and hypotensive but improved rapidly. Tx to [facility] for cEEG monitoring. Seen in ED in NAD, alert, awake, L hemiparesis.

## 2020-06-30 NOTE — ED PROVIDER NOTE - PHYSICAL EXAMINATION
GENERAL: A&Ox1, non-toxic appearing, no acute distress  HEENT: NCAT, EOMI, oral mucosa moist, normal conjunctiva  RESP: CTAB, no respiratory distress, no wheezes/rhonchi/rales, speaking in full sentences  CV: RRR, no murmurs/rubs/gallops  ABDOMEN: soft, non-tender, non-distended, no guarding  MSK: no visible deformities  NEURO: R gaze preference, LLE 4/5 strength, all other extremities 5/5 strength, equal , no clonus, L arm w/ occasional involuntary flexion  SKIN: warm, normal color, well perfused, no rash  PSYCH: normal affect    -Salvador Murillo, PGY2

## 2020-06-30 NOTE — ED PROVIDER NOTE - ATTENDING CONTRIBUTION TO CARE
Private Physician Nolberto Win. Diego Bob, Vinay Floyd pcp  86y female PMH DNR/DNI, Breast ca 1999 sp lumpectomy/RT, CAD 5v cabg, HLD, TIA/;CVA , Sp carotid enarectomy, Seizure d/o. Dementia, Pt was at home today with onset of "visual disturbance/hallucinaitons" always on the left side. Had telehealth visit with normal labs, ua and xray. Was well yesterday and noted to have unstable gait with left focal seizure. Seen at [facility] and transferred to [facility] for eval. PT denies current pain. In [facility] pt was given benzos with prolonged decreased mental status. according to ems has been slowing improving. PE Elderly female awake eyes closed oriented x3, speech slow fluent. Chest clear anterior & posterior cv no rubs, gallops or murmurs neuro GCS 14 [id number] power 4/5 lle plantar flexor, rle 5/5, mecca upper extr  strength   Rodney Deleon MD, Facep

## 2020-06-30 NOTE — ED PROVIDER NOTE - OBJECTIVE STATEMENT
86F presents with EMS from home where she was noted by family to have onset of seizures as of this morning at 6AM. The family has been trying to keep the patient out of the hospital. 1.5weeks ago, the patient had onset of leftward gaze deviation, no weakness. They consulted with [facility] where she was evaluated and they agreed on conservative management. The patient was awake and alert despite such deviation of gaze. She did not have any weakness of hand  or extremities. She was also ambulatory. This morning at 6AM, the patient had onset of seizures in addition. Noted to have involuntary twitching of the left upper and lower extremities. She was brought in by EMS with active seizure activity although awake and alert.

## 2020-06-30 NOTE — H&P ADULT - NSICDXFAMILYHX_GEN_ALL_CORE_FT
FAMILY HISTORY:  Family history of heart attack  Family history of ovarian cancer    Sibling  Still living? No  Family history of heart disease, Age at diagnosis: Age Unknown

## 2020-06-30 NOTE — GOALS OF CARE CONVERSATION - ADVANCED CARE PLANNING - CONVERSATION DETAILS
Pt. came to [facility] ED s/p seizure. Daughter Indira at bedside. Indira is HCP, HCP form seen in old chart. She states her mother is usually A&Ox3, however there is dx. of dementia. Also hx. of TIA and seizures. Indira states that there are documents at home indicating patient's wishes are for DNR/I "allow natural death". Daughter signed MOLST: DNR/I, allow trial of noninvasive ventilation, limited medical intervention, send to hospital, allow trial of IV fluids, allow antibiotics, no feeding tube.

## 2020-06-30 NOTE — ED PROVIDER NOTE - CLINICAL SUMMARY MEDICAL DECISION MAKING FREE TEXT BOX
DH: 86 year old female p/w visual hallucinations and seizure as transfer from [place], accepted by neurology for MRI and video EEG. C/f CVA vs TIA vs seizure. Plan for neurology cs, MRI, labs, ekg, UA/UCx, admit.

## 2020-06-30 NOTE — H&P ADULT - ATTENDING COMMENTS
likely symptomatic epilepsy w/ visual hallucinations.  plan : VEEG monitoring  Brain MRI  VEEG  start Vimpat 50 BID w/ no load  do not continue keppra

## 2020-06-30 NOTE — ED ADULT NURSE REASSESSMENT NOTE - NS ED NURSE REASSESS COMMENT FT1
patient resting comfortably on stretcher. patient family at bedside. will continue to monitor. patient comfort and safety provided.

## 2020-06-30 NOTE — CONSULT NOTE ADULT - SUBJECTIVE AND OBJECTIVE BOX
Chief Complaint:     85 year old  female pt PMHx of CAD s/p 5 vessel CABG, HTN, HLD, seizures, TIA, breast cancer s/p left lumpectomy, presents for [facility]. Patient states she gets occasional SOB, with activity over the the past few months. Seen and evaluated by [facility] ClemonsT done and referred for cardiac cath. Patient had a stress test scheduled yesterday , but unable to complete due to severe Hypotension.     HPI:    PMH:   Dementia  Breast cancer  Peripheral vision loss  CAD (coronary artery disease)  HLD (hyperlipidemia)  Seizure  TIA (transient ischemic attack)    PSH:   H/O lumpectomy  S/P tonsillectomy  S/P lumpectomy, right breast  S/P carotid endarterectomy  S/P CABG x 5    Family History:  FAMILY HISTORY:  Family history of heart disease (Sibling)  Family history of ovarian cancer  Family history of heart attack      Social History:  Smoking:  Alcohol:  Drugs:    Allergies:  lidocaine (Other)      Medications:  sodium chloride 0.9% Bolus 1000 milliLiter(s) IV Bolus once      REVIEW OF SYSTEMS:  CONSTITUTIONAL: No fever, weight loss, or fatigue  EYES: No eye pain, visual disturbances, or discharge  ENMT:  No difficulty hearing, tinnitus, vertigo; No sinus or throat pain  NECK: No pain or stiffness  BREASTS: No pain, masses, or nipple discharge  RESPIRATORY: No cough, wheezing, chills or hemoptysis; No shortness of breath  CARDIOVASCULAR: No chest pain, palpitations, dizziness, or leg swelling  GASTROINTESTINAL: No abdominal or epigastric pain. No nausea, vomiting, or hematemesis; No diarrhea or constipation. No melena or hematochezia.  GENITOURINARY: No dysuria, frequency, hematuria, or incontinence  NEUROLOGICAL: No headaches, memory loss, loss of strength, numbness, or tremors  SKIN: No itching, burning, rashes, or lesions   LYMPH NODES: No enlarged glands  ENDOCRINE: No heat or cold intolerance; No hair loss  MUSCULOSKELETAL: No joint pain or swelling; No muscle, back, or extremity pain  PSYCHIATRIC: No depression, anxiety, mood swings, or difficulty sleeping  HEME/LYMPH: No easy bruising, or bleeding gums  ALLERY AND IMMUNOLOGIC: No hives or eczema    Physical Exam:  T(C): 36.2 (06-30-20 @ 12:25), Max: 36.3 (06-30-20 @ 08:14)  HR: 73 (06-30-20 @ 12:25) (73 - 90)  BP: 87/56 (06-30-20 @ 12:25) (87/56 - 110/80)  RR: 20 (06-30-20 @ 12:25) (16 - 20)  SpO2: 99% (06-30-20 @ 12:25) (94% - 99%)  Wt(kg): --    GENERAL: NAD, elderly pale appearing lethargic   HEAD:  Atraumatic, Normocephalic  EYES: EOMI, conjunctiva and sclera clear  ENT: Moist mucous membranes,  NECK: Supple, No JVD, no bruits  CHEST/LUNG: Clear to percussion bilaterally; No rales, rhonchi, wheezing, or rubs  HEART: Regular rate and rhythm; No murmurs, rubs, or gallops PMI non displaced.  ABDOMEN: Soft, Nontender, Nondistended; Bowel sounds present  EXTREMITIES:  2+ Peripheral Pulses, No clubbing, cyanosis, or edema  SKIN: No rashes or lesions  NERVOUS SYSTEM:  Cranial Nerves II-XII intact     Cardiovascular Diagnostic Testing:  ECG:    < from: 12 Lead ECG (06.30.20 @ 08:19) >  Diagnosis Line Sinus tachycardia  Minimal voltage criteria for LVH, may be normal variant  ST depression, consider subendocardial injury  Nonspecific T wave abnormality  Abnormal ECG  No previous ECGs available    Confirmed by ALYCIA BARCENAS MD ([id number]) on 6/30/2020 9:14:26 AM    < end of copied text >      ECHO:        Labs:                        13.0   7.72  )-----------( 141      ( 30 Jun 2020 08:25 )             38.3     06-30    139  |  102  |  22  ----------------------------<  135<H>  4.0   |  22  |  1.22    Ca    9.5      30 Jun 2020 08:25    TPro  7.9  /  Alb  3.8  /  TBili  1.0  /  DBili  x   /  AST  18  /  ALT  28  /  AlkPhos  105  06-30    PT/INR - ( 30 Jun 2020 08:25 )   PT: 12.8 sec;   INR: 1.06 ratio         PTT - ( 30 Jun 2020 08:25 )  PTT:30.6 sec  CARDIAC MARKERS ( 30 Jun 2020 11:15 )  x     / x     / 64 U/L / x     / x      CARDIAC MARKERS ( 30 Jun 2020 08:25 )  <.017 ng/mL / x     / x     / x     / x          Imaging:    < from: Xray Chest 1 View AP/PA (06.30.20 @ 08:43) >  IMPRESSION: No evidence for focal infiltrate or lobar consolidation.        JADEN ZAYAS M.D., ATTENDING RADIOLOGIST  This document has been electronically signed. Jun 30 2020  9:00AM    < end of copied text >      cath 9/12/19 mid lad graft ok distal lm .2 medical therapy advised Chief Complaint:     85 year old  female pt PMHx of CAD s/p 5 vessel CABG, HTN, HLD, seizures, TIA, breast cancer s/p left lumpectomy, presents for Seen and evaluated by Dr. Diego Lock, he is referred ot ER with an altered mental status and seizure disorder and rule out cvva . daughter at bedside said she has dementia and can not do task such as laundry     HPI:    PMH:   Dementia  Breast cancer  Peripheral vision loss  CAD (coronary artery disease)  HLD (hyperlipidemia)  Seizure  TIA (transient ischemic attack)    PSH:   H/O lumpectomy  S/P tonsillectomy  S/P lumpectomy, right breast  S/P carotid endarterectomy  S/P CABG x 5    Family History:  FAMILY HISTORY:  Family history of heart disease (Sibling)  Family history of ovarian cancer  Family history of heart attack      Social History:  Smoking:  Alcohol:  Drugs:    Allergies:  lidocaine (Other)      Medications:  sodium chloride 0.9% Bolus 1000 milliLiter(s) IV Bolus once      REVIEW OF SYSTEMS:  CONSTITUTIONAL: No fever, weight loss, or fatigue  EYES: No eye pain, visual disturbances, or discharge  ENMT:  No difficulty hearing, tinnitus, vertigo; No sinus or throat pain  NECK: No pain or stiffness  BREASTS: No pain, masses, or nipple discharge  RESPIRATORY: No cough, wheezing, chills or hemoptysis; No shortness of breath  CARDIOVASCULAR: No chest pain, palpitations, dizziness, or leg swelling  GASTROINTESTINAL: No abdominal or epigastric pain. No nausea, vomiting, or hematemesis; No diarrhea or constipation. No melena or hematochezia.  GENITOURINARY: No dysuria, frequency, hematuria, or incontinence  NEUROLOGICAL: No headaches, memory loss, loss of strength, numbness, or tremors  SKIN: No itching, burning, rashes, or lesions   LYMPH NODES: No enlarged glands  ENDOCRINE: No heat or cold intolerance; No hair loss  MUSCULOSKELETAL: No joint pain or swelling; No muscle, back, or extremity pain  PSYCHIATRIC: No depression, anxiety, mood swings, or difficulty sleeping  HEME/LYMPH: No easy bruising, or bleeding gums  ALLERY AND IMMUNOLOGIC: No hives or eczema    Physical Exam:  T(C): 36.2 (06-30-20 @ 12:25), Max: 36.3 (06-30-20 @ 08:14)  HR: 73 (06-30-20 @ 12:25) (73 - 90)  BP: 87/56 (06-30-20 @ 12:25) (87/56 - 110/80)  RR: 20 (06-30-20 @ 12:25) (16 - 20)  SpO2: 99% (06-30-20 @ 12:25) (94% - 99%)  Wt(kg): --    GENERAL: NAD, elderly pale appearing lethargic   HEAD:  Atraumatic, Normocephalic  EYES: EOMI, conjunctiva and sclera clear  ENT: Moist mucous membranes,  NECK: Supple, No JVD, no bruits  CHEST/LUNG: Clear to percussion bilaterally; No rales, rhonchi, wheezing, or rubs  HEART: Regular rate and rhythm; No murmurs, rubs, or gallops PMI non displaced.  ABDOMEN: Soft, Nontender, Nondistended; Bowel sounds present  EXTREMITIES:  2+ Peripheral Pulses, No clubbing, cyanosis, or edema  SKIN: No rashes or lesions  NERVOUS SYSTEM:  Cranial Nerves II-XII intact     Cardiovascular Diagnostic Testing:  ECG:    < from: 12 Lead ECG (06.30.20 @ 08:19) >  Diagnosis Line Sinus tachycardia  Minimal voltage criteria for LVH, may be normal variant  ST depression, consider subendocardial injury  Nonspecific T wave abnormality  Abnormal ECG  No previous ECGs available    Confirmed by ALYCIA BARCENAS MD ([id number]) on 6/30/2020 9:14:26 AM    < end of copied text >      ECHO:        Labs:                        13.0   7.72  )-----------( 141      ( 30 Jun 2020 08:25 )             38.3     06-30    139  |  102  |  22  ----------------------------<  135<H>  4.0   |  22  |  1.22    Ca    9.5      30 Jun 2020 08:25    TPro  7.9  /  Alb  3.8  /  TBili  1.0  /  DBili  x   /  AST  18  /  ALT  28  /  AlkPhos  105  06-30    PT/INR - ( 30 Jun 2020 08:25 )   PT: 12.8 sec;   INR: 1.06 ratio         PTT - ( 30 Jun 2020 08:25 )  PTT:30.6 sec  CARDIAC MARKERS ( 30 Jun 2020 11:15 )  x     / x     / 64 U/L / x     / x      CARDIAC MARKERS ( 30 Jun 2020 08:25 )  <.017 ng/mL / x     / x     / x     / x          Imaging:    < from: Xray Chest 1 View AP/PA (06.30.20 @ 08:43) >  IMPRESSION: No evidence for focal infiltrate or lobar consolidation.        JADEN ZAYAS M.D., ATTENDING RADIOLOGIST  This document has been electronically signed. Jun 30 2020  9:00AM    < end of copied text >      cath 9/12/19 mid lad graft ok distal lm .2 medical therapy advised

## 2020-06-30 NOTE — ED PROVIDER NOTE - CRITICAL CARE PROVIDED
interpretation of diagnostic studies/consultation with other physicians/conducted a detailed discussion of DNR status/direct patient care (not related to procedure)/documentation/additional history taking/consult w/ pt's family directly relating to pts condition documentation/consult w/ pt's family directly relating to pts condition/additional history taking/interpretation of diagnostic studies/consultation with other physicians/direct patient care (not related to procedure) documentation/additional history taking/consultation with other physicians/conducted a detailed discussion of DNR status/consult w/ pt's family directly relating to pts condition/direct patient care (not related to procedure)/interpretation of diagnostic studies

## 2020-06-30 NOTE — H&P ADULT - NSHPPHYSICALEXAM_GEN_ALL_CORE
Incomplete:  MS: Awake, alert, oriented. Normal affect. Follows all commands.    Language: Speech is clear, fluent with good repetition & comprehension     CNs: eyes moving spontaneously in all directions. well developed masseter muscles b/l. No facial asymmetry b/l. Symmetric palate elevation in midline. Gag reflex deferred. Tongue midline, normal movements, no atrophy.    Motor: Normal muscle bulk & tone. No noticeable tremor or seizure. No pronator drift.  Moving all extremities equally without laterality    Sensation: Intact to LT General: Elderly, appears comfortable in NAD  MS: Eyes closed but opens to name easily, alert, oriented to person and place but not to time. Normal affect. Follows simple commands, cannot cross midline, Extinction present on L. unable to do multistep commands.  Language: Speech is mildly dysarthric, but fluent with good repetition & comprehension  CNs: eyes moving spontaneously in all directions. well developed masseter muscles b/l. No facial asymmetry b/l. Symmetric palate elevation in midline. Gag reflex deferred. Tongue midline, normal movements, no atrophy.  Motor: LUE with subtle cerebellar drift, 4/5, LLE 3/5, down and out  Sensation: diminished to touch on left  L babinski present

## 2020-06-30 NOTE — STROKE CODE NOTE - OBJECTIVE
She was evaluated for scintillating scotomata or visual field deficits by my colleague, Dr. Phoenix Lawrence in 2018, and was found to have had multiple chronic infarcts (there was no indication as to stroke mechanism).  At baseline, she has mild memory deficits and is independent in ADLs.  For 1 week prior to this evaluation (about 1 week ago taken as last known normal), she had recurrent episodes of scintillating scotomata, possibly associated with a slight decline in her cognitive function.  Today, 6/30/2020, in the morning, her daughter found her to be having a left-sided focal seizure.  Neurologic exam is markedly limited by sedation.

## 2020-06-30 NOTE — H&P ADULT - NSHPLABSRESULTS_GEN_ALL_CORE
11.4   6.11  )-----------( 117      ( 30 Jun 2020 15:17 )             34.0   06-30    139  |  108  |  18  ----------------------------<  113<H>  4.5   |  20<L>  |  0.80    Ca    8.5      30 Jun 2020 15:17    TPro  6.7  /  Alb  3.8  /  TBili  0.7  /  DBili  x   /  AST  22  /  ALT  18  /  AlkPhos  81  06-30  PT/INR - ( 30 Jun 2020 15:17 )   PT: 12.7 sec;   INR: 1.11 ratio         PTT - ( 30 Jun 2020 15:17 )  PTT:28.8 sec    < from: CT Brain Stroke Protocol (06.30.20 @ 08:43) >    IMPRESSION:    1)  extensive chronic ischemic changes and multiple old infarcts in both hemispheres. No obvious acute territorial infarct or hemorrhagic lesion.  2)  follow-up MR imaging advised.    < end of copied text >

## 2020-06-30 NOTE — ED PROVIDER NOTE - PHYSICAL EXAMINATION
left sided upper and lower extremity twitching observed. Leftward gaze deviation . Moving the right side well.

## 2020-06-30 NOTE — ED ADULT NURSE NOTE - OBJECTIVE STATEMENT
Patient brought in by EMS from home for seizure activity. As per daughter, she noticed patient to be having seizure activity at 630 this morning. Patient unable to provide information at this time. As per daughter, pt has history of seizures and CVA

## 2020-06-30 NOTE — ED PROVIDER NOTE - PROGRESS NOTE DETAILS
d/w Patricia Thakkar of neuro who accepted transfer to [facility]. D/W Dr Lock in ED. d/w Heydi via Transfer Center d/w Diego via Transfer Center

## 2020-06-30 NOTE — H&P ADULT - ASSESSMENT
81 yo f with history of TIA/CVA, seizure disorder (recently weaned off Keppra), CAD, multiple bypass tx from [place] for concern for seizures.     Recs:  vEEG monitoring  MR head w/o contrast    Rescue: Ativan 2mg, Vimpat 200mg for seizures > 3 minutes 81 yo woman with history of multiple strokes of unknown etiology (w/u inc ILR negative follows with Dr. Lawrence), carotid endarterectomy, mild-mod cognitive dysfx but can be AAOx2-3, hx of seizures (multiple GTCs in 2012, was on keppra but taken off all AEDs for past 4 years), CAD, multiple bypass tx on ASA 81 Transferred from [place] for EEG monitoring for concern for seizures.     Plan:  [] vEEG monitoring  [] MR head w/o contrast - to be done prior to coming from ER - r/o recurrent infarction  [] f/u UA, COVID19  [x] c/w home meds, inclduing ASA, held IMDUR for now for hypotension, please resume as tolerated  [x] med rec complete  [x] DVT PPx : Lovenox 40 SQ, Diet Dash    Rescue: Ativan 1mg, then Vimpat 100mg for seizures > 3 minutes    - Patient's/Family's questions and concerns addressed. They voiced understanding.    d/w Dr. Plummer 81 yo woman with history of multiple strokes of unknown etiology (w/u inc ILR negative follows with Dr. Lawrence), carotid endarterectomy, mild-mod cognitive dysfx but can be AAOx2-3, hx of seizures (multiple GTCs in 2012, was on keppra but taken off all AEDs for past 4 years), CAD, multiple bypass tx on ASA 81 Transferred from [place] for EEG monitoring for concern for seizures.     Plan:  [] vEEG monitoring  [] MR head w/o contrast - to be done prior to coming from ER - r/o recurrent infarction  [] f/u UA, COVID19  [x] c/w home meds, inclduing ASA, held IMDUR for now for hypotension, please resume as tolerated  [x] med rec complete  [x] DVT PPx : Lovenox 40 SQ, Diet Dash    Rescue: Ativan 1mg, then Vimpat 100mg for seizures > 3 minutes  CODE STATUS : DNR/DNI     - Patient's/Family's questions and concerns addressed. They voiced understanding.    d/w Dr. Plummer 81 yo woman with history of multiple strokes of unknown etiology (w/u inc ILR negative follows with Dr. Lawrence), carotid endarterectomy, mild-mod cognitive dysfx but can be AAOx2-3, hx of seizures (multiple GTCs in 2012, was on keppra but taken off all AEDs for past 4 years), CAD, multiple bypass tx on ASA 81 Transferred from [place] for EEG monitoring for concern for seizures.     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 recrudescence. Visual hallucinations could be ictal also given occipital strokes. Event Capture and AED optimization warranted.    Plan:  [] vEEG monitoring  [] MR head w/o contrast - to be done prior to coming from ER - r/o recurrent infarction  [] f/u UA, COVID19  [x] c/w home meds, inclduing ASA, held IMDUR for now for hypotension, please resume as tolerated  [x] med rec complete  [x] DVT PPx : Lovenox 40 SQ, Diet Dash    Rescue: Ativan 1mg, then Vimpat 100mg for seizures > 3 minutes  CODE STATUS : DNR/DNI     - Patient's/Family's questions and concerns addressed. They voiced understanding.    d/w Dr. Plummer 83 yo woman with history of multiple strokes of unknown etiology (w/u inc ILR negative follows with Dr. Lawrence), carotid endarterectomy, mild-mod cognitive dysfx but can be AAOx2-3, hx of seizures (multiple GTCs in 2012, was on keppra but taken off all AEDs for past 4 years), CAD, multiple bypass tx on ASA 81 Transferred from [place] for EEG monitoring for concern for seizures.     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 recrudescence. Visual hallucinations could be ictal also given occipital strokes. Event Capture and AED optimization warranted.    Plan:  [x] start vimpat 50mg q12h - low dose to allow event capture for now  [] vEEG monitoring  [] MR head w/o contrast - to be done prior to coming from ER - r/o recurrent infarction  [] f/u UA, COVID19  [x] c/w home meds, inclduing ASA, held IMDUR for now for hypotension, please resume as tolerated  [x] med rec complete  [x] DVT PPx : Lovenox 40 SQ, Diet Dash    Rescue: Ativan 1mg, then Vimpat 100mg for seizures > 3 minutes  CODE STATUS : DNR/DNI     - Patient's/Family's questions and concerns addressed. They voiced understanding.    d/w Dr. Plummer

## 2020-06-30 NOTE — H&P ADULT - NSICDXPASTSURGICALHX_GEN_ALL_CORE_FT
PAST SURGICAL HISTORY:  H/O lumpectomy Left breast    S/P CABG x 5 2006    S/P carotid endarterectomy right, 2006    S/P tonsillectomy

## 2020-07-01 NOTE — OCCUPATIONAL THERAPY INITIAL EVALUATION ADULT - PRECAUTIONS/LIMITATIONS, REHAB EVAL
As per family, pt has been feeling "off" for about 1 week, with intermittent visual hallucinations (sees animals and scintillating scotomas), saying things that don't make sense, also having L head turning and staring spells./fall precautions

## 2020-07-01 NOTE — OCCUPATIONAL THERAPY INITIAL EVALUATION ADULT - COGNITIVE, VISUAL PERCEPTUAL, OT EVAL
Pt will be A+O x 4 within 4 weeks, with choices as needed. Pt will follow 100% simple directions within 4 weeks.

## 2020-07-01 NOTE — PHYSICAL THERAPY INITIAL EVALUATION ADULT - PERTINENT HX OF CURRENT PROBLEM, REHAB EVAL
PMHx: multiple CVAs of unknown etiology, CEA, mild-mod cognitive dysfx, h/o sz's (taken off all AEDs x4yrs), CAD, multiple bypass Per family, pt has been feeling "off" for ~1wk, with intermittent visual hallucinations (animals & scintillating scotomas), saying things that don't make sense, & +L head turning & staring spells. cont...

## 2020-07-01 NOTE — OCCUPATIONAL THERAPY INITIAL EVALUATION ADULT - RANGE OF MOTION EXAMINATION, UPPER EXTREMITY
Right UE Active ROM was WNL (within normal limits)/Left UE Active Assistive ROM was WFL  (within functional limits)

## 2020-07-01 NOTE — PROGRESS NOTE ADULT - SUBJECTIVE AND OBJECTIVE BOX
SUBJECTIVE:     Patient examined at the bedside on the morning of 20    PAST MEDICAL & SURGICAL HISTORY:  Dementia  Breast cancer  Peripheral vision loss  CAD (coronary artery disease)  HLD (hyperlipidemia)  Seizure  TIA (transient ischemic attack)  H/O lumpectomy: Left breast  S/P tonsillectomy  S/P carotid endarterectomy: right, 2006  S/P CABG x 5: 2006    FAMILY HISTORY:  Family history of heart disease (Sibling)  Family history of ovarian cancer  Family history of heart attack      MEDICATIONS (HOME):  Home Medications:  Aspirin Enteric Coated 81 mg oral delayed release tablet: 1 tab(s) orally once a day (10 Sep 2019 15:31)  atorvastatin 40 mg oral tablet: 1 tab(s) orally once a day (10 Sep 2019 15:31)  Calcium 600+D: 1 tab(s) orally once a day (10 Sep 2019 15:31)  Centrum oral tablet: 1 tab(s) orally once a day (10 Sep 2019 15:31)  citalopram 10 mg oral tablet: 1 tab(s) orally once a day (10 Sep 2019 15:31)  Co Q-10 100 mg oral capsule: 2 cap(s) orally once a day (10 Sep 2019 15:31)  Fish Oil 1000 mg oral capsule: 2 cap(s) orally once a day (10 Sep 2019 15:31)  isosorbide mononitrate 30 mg oral tablet, extended release: 1 tab(s) orally 2 times a day (2020 16:37)  memantine 5 mg oral tablet: 1 tab(s) orally once a day (10 Sep 2019 15:31)  Metoprolol Succinate ER 50 mg oral tablet, extended release: 1 tab(s) orally once a day (2020 16:38)  Vitamin B-12 500 mcg oral tablet: 1 tab(s) orally once a day (10 Sep 2019 15:31)    MEDICATIONS  (STANDING):  aspirin enteric coated 81 milliGRAM(s) Oral daily  atorvastatin 40 milliGRAM(s) Oral at bedtime  brivaracetam  IVPB 50 milliGRAM(s) IV Intermittent two times a day  citalopram 10 milliGRAM(s) Oral daily  enoxaparin Injectable 40 milliGRAM(s) SubCutaneous every 24 hours  lacosamide IVPB 75 milliGRAM(s) IV Intermittent every 12 hours  memantine 5 milliGRAM(s) Oral two times a day  metoprolol succinate ER 50 milliGRAM(s) Oral daily  sodium chloride 0.9%. 1000 milliLiter(s) (85 mL/Hr) IV Continuous <Continuous>    MEDICATIONS  (PRN):  lacosamide IVPB 100 milliGRAM(s) IV Intermittent once PRN for seizures refractory to Ativan >  3mins  LORazepam   Injectable 1 milliGRAM(s) IV Push once PRN Seizure GTC or focal  > 3 mins    ALLERGIES/INTOLERANCES:  Allergies  lidocaine (Other)    Intolerances    VITALS & EXAMINATION:  Vital Signs Last 24 Hrs  T(C): 36.6 (2020 07:34), Max: 37 (2020 14:10)  T(F): 97.8 (2020 07:34), Max: 98.6 (2020 14:10)  HR: 74 (2020 07:34) (57 - 78)  BP: 135/81 (2020 07:34) (112/71 - 142/85)  BP(mean): --  RR: 18 (2020 07:34) (14 - 18)  SpO2: 100% (2020 07:34) (98% - 100%)    Physical Exam: General: Elderly, appears comfortable in NAD  	MS: Eyes closed but opens to name easily, alert, , cannot cross midline, Extinction present on L. unable to do multistep commands. Concerns for focal status  	CNs: eyes moving spontaneously in all directions. L Homonymous Hemianopsia. well developed masseter muscles b/l. No facial asymmetry b/l. Symmetric palate elevation in midline. Gag reflex deferred. Tongue midline, normal movements, no atrophy.  	Motor: LUE with subtle cerebellar drift, 4/5, LLE 3/5, down and out  	Sensation: diminished to touch on left  L babinski present    LABORATORY:  CBC                       12.9   5.54  )-----------( 110      ( 2020 07:54 )             38.5     Chem 07-01    140  |  108  |  12  ----------------------------<  94  4.1   |  22  |  0.76    Ca    9.0      2020 07:53  Phos  3.2     07-01  Mg     2.2     07-01    TPro  7.1  /  Alb  3.9  /  TBili  1.2  /  DBili  x   /  AST  22  /  ALT  19  /  AlkPhos  80  07-    LFTs LIVER FUNCTIONS - ( 2020 07:53 )  Alb: 3.9 g/dL / Pro: 7.1 g/dL / ALK PHOS: 80 U/L / ALT: 19 U/L / AST: 22 U/L / GGT: x           Coagulopathy PT/INR - ( 2020 15:17 )   PT: 12.7 sec;   INR: 1.11 ratio         PTT - ( 2020 15:17 )  PTT:28.8 sec  Lipid Panel  Chol 115 LDL 53 HDL 47<L> Trig 75  A1c   Cardiac enzymes CARDIAC MARKERS ( 2020 07:53 )  x     / x     / 70 U/L / x     / x      CARDIAC MARKERS ( 2020 15:17 )  x     / x     / 63 U/L / x     / x      CARDIAC MARKERS ( 2020 11:15 )  x     / x     / 64 U/L / x     / x      CARDIAC MARKERS ( 2020 08:25 )  <.017 ng/mL / x     / x     / x     / x          U/A Urinalysis Basic - ( 2020 15:17 )    Color: Light Yellow / Appearance: Clear / S.013 / pH: x  Gluc: x / Ketone: Negative  / Bili: Negative / Urobili: Negative   Blood: x / Protein: Negative / Nitrite: Negative   Leuk Esterase: Negative / RBC: x / WBC x   Sq Epi: x / Non Sq Epi: x / Bacteria: x      CSF  Immunological  Other    STUDIES & IMAGING:  Studies (EKG, EEG, EMG, etc):     EEG SUMMARY/CLASSIFICATION  Abnormal EEG in the awake, drowsy and asleep states.  -10 to 15 subclinical electrographic seizures per hour recorded over the right posterior quadrant, with EEG onset in the right hemisphere with the development of low voltage beta activity or sharply contoured alpha activity in the right hemisphere, maximum in the right posterior quadrant, which evolves into rhythmic delta activity, maximum in the right parasagittal region, maximum P4.  Duration 60-90 seconds  -Lateralized Periodic Discharges (LPDs) in the right parietal region, broadly contoured, maximum P4, at frequency of 0.5 Hz  -Continuous polymorphic delta and theta slowing in the right hemisphere  -Continuous diffuse theta and polymorphic delta slowing.  _____________________________________________________________  EEG IMPRESSION/CLINICAL CORRELATE  Abnormal EEG study.  1. 10 to 15 subclinical electrographic seizures per hour recorded over the right posterior quadrant.  2. Potential seizure focus in the right parietal region.  3. Structural or functional abnormality in the right hemisphere.  4. Moderate diffuse nonspecific cerebral dysfunction.    Radiology (XR, CT, MR, U/S, TTE/TONNY):    < from: CT Brain Stroke Protocol (20 @ 08:43) >    INTERPRETATION:  INDICATION:  Stroke  TECHNIQUE:  A non contrast 2.5mm axial CT study of the brain was performed from skull base to vertex. Coronal and sagittal reformations were generated from the axial data.  COMPARISON EXAMINATION:  No prior    FINDINGS:      HEMISPHERES:  No hemorrhagic lesion is identified. There are multiple chronic appearing infarct. There is a large old right MCA infarct with extensive gliosis and encephalomalacia. There are old infarcts in the left MCA distribution, both basal ganglia, and in both occipital lobes. Atrophic changes are noted. Scattered lacunar infarcts in the white matter are indeterminate age.  VENTRICLES:  Ex vacuo enlargementis noted  POSTERIOR FOSSA:  Chronic ischemic changes are noted  EXTRACEREBRAL SPACES:  No subdural or epidural collections are noted.  SKULL BASE AND CALVARIUM:  Appears intact.  No fracture or destructive lesion is identified.  SINUSES AND MASTOIDS: Clear.  MISCELLANEOUS:  [] Artificial intelligence was utilized to screen for hemorrhage    IMPRESSION:    1)  extensive chronic ischemic changes and multiple old infarcts in both hemispheres. No obvious acute territorial infarct or hemorrhagic lesion.  2)  follow-up MR imaging advised.    < end of copied text >

## 2020-07-01 NOTE — OCCUPATIONAL THERAPY INITIAL EVALUATION ADULT - PLANNED THERAPY INTERVENTIONS, OT EVAL
ROM/strengthening/transfer training/ADL retraining/balance training/bed mobility training/cognitive, visual perceptual/neuromuscular re-education

## 2020-07-01 NOTE — PHYSICAL THERAPY INITIAL EVALUATION ADULT - GENERAL OBSERVATIONS, REHAB EVAL
received supine with head of bed elevated +vEEG, +iv, +O2 via nc, +external female catheter and  at bedside

## 2020-07-01 NOTE — CONSULT NOTE ADULT - ATTENDING COMMENTS
Patient seen and examined. Reported EEG with focal status epilepticus. Further care per primary epilepsy team.

## 2020-07-01 NOTE — PROGRESS NOTE ADULT - ASSESSMENT
82 year old female with PMHx of multiple bi-hemispheric strokes of unknown etiology (w/u includes outpatient ILR which was negative, follows with Dr. Lawrence), s/p R carotid endarterectomy, usually AOx2-3, hx of seizures (multiple GTCs in 2012, weaned off keppra 4 years ago), CAD, multiple bypass tx on ASA 81 who was brought to [place] from [place] for concern for seizures. Per family, patient has been feeling "off" for about 1 week, with intermittent visual hallucinations (sees animals and scintillating scotomas), saying things that don't make sense, also having L head turning and staring spells. Per daughter patient was home on day of presentation and had acute onset of visual hallucinations, noted to have unsteady gait and left sided shaking.  She was taken to , where CTH showed chronic infarcts.  She was given Ativan 2mg IVP for left sided shaking, became lethargic and hypotensive but improved rapidly. She was transferred to [facility] for cEEG monitoring. She was in focal status per EEG read arising from R Temporal Lobe. Stroke neurology was consulted for MRI showing acute R occipital lobe PCA infarct.     Impression: Focal Status arising from R Temporal Lobe, acute stroke of unknown etiology, possibly the source of her hallucinations and current focal status      Recommendations:    Plan:  [x] start Vimpat 75mg q12h IV following 100mg IV load  [x] Start Briviact 50mg bid IV following 100mg IV load  [] vEEG monitoring  [x] MR head w/o contrast - to be done prior to coming from ER - Note R occipital infarction  [x] f/u UA, COVID19  [x] C/w Aspirin 81mg qd po  [x] DVT PPx : Lovenox 40 SQ, Diet Dash

## 2020-07-01 NOTE — PHYSICAL THERAPY INITIAL EVALUATION ADULT - ADDITIONAL COMMENTS
as per pt: lives with  and grandchildren in private house and didn't' use an assistive device with ambulation, right hand dominant

## 2020-07-01 NOTE — OCCUPATIONAL THERAPY INITIAL EVALUATION ADULT - ADDITIONAL COMMENTS
Per daughter pt was home today had acute onset of visual hallucinations noted to have unsteady gait and left sided focal shaking. Per daughter, prior to 1 week pt was independently ambulating and doing "yoga routines." Taken to [place], [facility] CTH with chronic infarcts. Given Ativan 2mg IVP for left sided shaking became lethargic and hypotensive but improved rapidly. Tx to [facility] for cEEG monitoring. Seen in ED in NAD, alert, awake, L hemiparesis.

## 2020-07-01 NOTE — PHYSICAL THERAPY INITIAL EVALUATION ADULT - PRECAUTIONS/LIMITATIONS, REHAB EVAL
Today pt had acute onset visual hallucinations, unsteady gait & L sided focal shaking. CTH at [place]: chronic infarcts. s/p Ativan for L sided shaking, became lethargic & hypotensive but improved rapidly. transferred to [facility]. +L hemiparesis possible Mikey's phenomenon vs. re-infarction vs. recrudescence, Visual hallucinations could be ictal also given occipital strokes. MRI 6/30: Acute R occipital lobe PCA distribution infarct, Multiple chronic cerebral infarcts as described above with hemosiderin deposition within the right frontal and right occipital regions, Chronic R cerebellar infarcts. vEEG 7/1: 10 to 15 subclinical electrographic seizures per hour recorded over the R posterior quadrant, Potential seizure focus in the R parietal region./fall precautions/seizure precautions

## 2020-07-01 NOTE — CONSULT NOTE ADULT - SUBJECTIVE AND OBJECTIVE BOX
HPI:  Patient is an 82 year old female with PMHx of multiple strokes of unknown etiology (w/u inc ILR negative, follows with Dr. Lawrence), s/p R carotid endarterectomy, usually AOx2-3, hx of seizures (multiple GTCs in 2012, was on keppra but taken off all AEDs 4 years ago), CAD, multiple bypass tx on ASA 81 who was brought to [facility] from [place] for concern for seizures. Per family, patient has been feeling "off" for about 1 week, with intermittent visual hallucinations (sees animals and scintillating scotomas), saying things that don't make sense, also having L head turning and staring spells. Per daughter patient was home on day of presentation and had acute onset of visual hallucinations, noted to have unsteady gait and left sided shaking.  She was taken to , where CTH showed chronic infarcts.  She was given Ativan 2mg IVP for left sided shaking, became lethargic and hypotensive but improved rapidly. She was transferred to [facility] for cEEG monitoring. She is currently in focal status per primary team  Stroke neurology was consulted for MRI showing acute R occipital lobe PCA infarct.      General:  Constitutional: Female, appears stated age, in NAD  Head: Normocephalic & atraumatic.  Neurological (>12):  MS: Awake, alert, oriented to self, not to time  Language: dysarthric speech  CNs: VF: L homonymous hemianopia, EOMI, no nystagmus, no diplopia. V1-3 intact to LT, No facial asymmetry b/l, full eye closure strength b/l. Hearing grossly normal b/l.       Motor: Normal muscle bulk & tone. No noticeable tremor. slight LUE drift              Deltoid	Biceps	Triceps	   R	5	5	5	5	  L	5	5	5	5	    RLE, LLE: at least AG in both	   Gait: deferred    LABORATORY:  CBC                       12.9   5.54  )-----------( 110      ( 01 Jul 2020 07:54 )             38.5     Chem 07-01    140  |  108  |  12  ----------------------------<  94  4.1   |  22  |  0.76    Ca    9.0      01 Jul 2020 07:53  Phos  3.2     07-01  Mg     2.2     07-01    TPro  7.1  /  Alb  3.9  /  TBili  1.2  /  DBili  x   /  AST  22  /  ALT  19  /  AlkPhos  80  07-01    LFTs LIVER FUNCTIONS - ( 01 Jul 2020 07:53 )  Alb: 3.9 g/dL / Pro: 7.1 g/dL / ALK PHOS: 80 U/L / ALT: 19 U/L / AST: 22 U/L / GGT: x           Coagulopathy PT/INR - ( 30 Jun 2020 15:17 )   PT: 12.7 sec;   INR: 1.11 ratio

## 2020-07-01 NOTE — OCCUPATIONAL THERAPY INITIAL EVALUATION ADULT - PERTINENT HX OF CURRENT PROBLEM, REHAB EVAL
81yo F with PMH multiple strokes of UNK etiology (w/u inc ILR negative follows with ), carotid endarterectomy, mild-mod cognitive dysfx but can be AAOx2-3, hx of seizures (multiple GTCs in 2012, was on keppra but taken off all AEDs for past 4 years), CAD, multiple bypass tx on ASA 81 [place] [facility] [place] [facility] [place].

## 2020-07-01 NOTE — OCCUPATIONAL THERAPY INITIAL EVALUATION ADULT - LIVES WITH, PROFILE
spouse/Pt lives with spouse and daughter in pvt home. (I) ADLs and functional ambulation without AD. +Stall. No DME/AD

## 2020-07-01 NOTE — CONSULT NOTE ADULT - ASSESSMENT
82 year old female with PMHx of multiple bi-hemispheric strokes of unknown etiology (w/u includes outpatient ILR which was negative, follows with Dr. Lawrence), s/p R carotid endarterectomy, usually AOx2-3, hx of seizures (multiple GTCs in 2012, weaned off keppra 4 years ago), CAD, multiple bypass tx on ASA 81 who was brought to [facility] from [place] for concern for seizures. Per family, patient has been feeling "off" for about 1 week, with intermittent visual hallucinations (sees animals and scintillating scotomas), saying things that don't make sense, also having L head turning and staring spells. Per daughter patient was home on day of presentation and had acute onset of visual hallucinations, noted to have unsteady gait and left sided shaking.  She was taken to , where CTH showed chronic infarcts.  She was given Ativan 2mg IVP for left sided shaking, became lethargic and hypotensive but improved rapidly. She was transferred to [facility] for cEEG monitoring. She is currently in focal status per primary team  Stroke neurology was consulted for MRI showing acute R occipital lobe PCA infarct.     Impression: acute stroke of unknown etiology, possibly the source of her hallucinations and current focal status    Plan  [] continue ASA 81  [] continue AED management per primary team  [] seizure/aspiration/fall precautions  [] no further stroke work up while inpatient, follow up outpatient with Dr Lawrence when discharged.

## 2020-07-01 NOTE — OCCUPATIONAL THERAPY INITIAL EVALUATION ADULT - GENERAL OBSERVATIONS, REHAB EVAL
Patient received semi-supine in bed, +tele, pulse ox, +IVL, vEEG Patient received semi-supine in bed, 2L O2 via NC, primafit, +IV, vEEG

## 2020-07-01 NOTE — EEG REPORT - NS EEG TEXT BOX
[facility]   COMPREHENSIVE EPILEPSY CENTER   REPORT OF CONTINUOUS VIDEO EEG     [facility]: [address], [place] [address], Ph#: [phone]  [facility]: [id number], [place] [address], Ph#: [phone]  Office: [address] [place] [address], [facility] [address], [place] [address] Ph#: [phone]    Patient Name: SARA FARAH  Age and : 86y (34)  MRN #: [id number]  Location: [place] [address]  Referring Physician: Patricia Plummer    Study Date: 20  Duration: 5hr 34min    _____________________________________________________________  STUDY INFORMATION    EEG Recording Technique:  The patient underwent continuous Video-EEG monitoring, using Telemetry System hardware on the XLTek Digital System. EEG and video data were stored on a computer hard drive with important events saved in digital archive files. The material was reviewed by a physician (electroencephalographer / epileptologist) on a daily basis. Beni and seizure detection algorithms were utilized and reviewed. An EEG Technician attended to the patient, and was available throughout daytime work hours.  The epilepsy center neurologist was available in person or on call 24-hours per day.    EEG Placement and Labeling of Electrodes:  The EEG was performed utilizing 20 channel referential EEG connections (coronal over temporal over parasagittal montage) using all standard 10-20 electrode placements with EKG, with additional electrodes placed in the inferior temporal region using the modified 10-10 montage electrode placements for elective admissions, or if deemed necessary. Recording was at a sampling rate of 256 samples per second per channel. Time synchronized digital video recording was done simultaneously with EEG recording. A low light infrared camera was used for low light recording.     _____________________________________________________________  HISTORY    Patient is a 86y old  Female who presents with a chief complaint of seizures    PERTINENT MEDICATION:  MEDICATIONS  (STANDING):  aspirin enteric coated 81 milliGRAM(s) Oral daily  atorvastatin 40 milliGRAM(s) Oral at bedtime  brivaracetam  IVPB 100 milliGRAM(s) IV Intermittent once  citalopram 10 milliGRAM(s) Oral daily  enoxaparin Injectable 40 milliGRAM(s) SubCutaneous every 24 hours  lacosamide 75 milliGRAM(s) Oral two times a day  memantine 5 milliGRAM(s) Oral two times a day  metoprolol succinate ER 50 milliGRAM(s) Oral daily  sodium chloride 0.9%. 1000 milliLiter(s) (85 mL/Hr) IV Continuous <Continuous>    _____________________________________________________________  INTERPRETATION      Interpretation:    Starting: Day 1      2020      Time: 01:54     Duration: 05:34    Daily EEG Visual Analysis  FINDINGS:  The background was continuous, diffuse delta and theta activity, spontaneously variable and reactive. During wakefulness, the posterior dominant rhythm consisted of a well-modulated 7.5 Hz activity in the left posterior quadrant, with amplitude to 30 uV, that attenuated to eye opening.  Low amplitude frontal beta was noted in wakefulness.    Background Slowing:  -Continuous diffuse theta and polymorphic delta slowing.    Focal Slowing:   -Continuous polymorphic delta and theta slowing in the right hemisphere    Sleep Background:    Other Non-Epileptiform Findings:  None were present.    Interictal Epileptiform Activity:   -Lateralized Periodic Discharges (LPDs) in the right parietal region, broadly contoured, maximum P4, at frequency of 0.5 Hz    Events:  -10 to 15 subclinical electrographic seizures per hour recorded over the right posterior quadrant, with EEG onset in the right hemisphere with the development of low voltage beta activity or sharply contoured alpha activity in the right hemisphere, maximum in the right posterior quadrant, which evolves into rhythmic delta activity, maximum in the right parasagittal region, maximum P4.  Duration 60-90 seconds    Artifacts:  Intermittent myogenic and movement artifacts were noted.    ECG:  The heart rate on single channel ECG was predominantly between 60-80 BPM.    _____________________________________________________________  EEG SUMMARY/CLASSIFICATION  Abnormal EEG in the awake, drowsy and asleep states.  -10 to 15 subclinical electrographic seizures per hour recorded over the right posterior quadrant, with EEG onset in the right hemisphere with the development of low voltage beta activity or sharply contoured alpha activity in the right hemisphere, maximum in the right posterior quadrant, which evolves into rhythmic delta activity, maximum in the right parasagittal region, maximum P4.  Duration 60-90 seconds  -Lateralized Periodic Discharges (LPDs) in the right parietal region, broadly contoured, maximum P4, at frequency of 0.5 Hz  -Continuous polymorphic delta and theta slowing in the right hemisphere  -Continuous diffuse theta and polymorphic delta slowing.  _____________________________________________________________  EEG IMPRESSION/CLINICAL CORRELATE  Abnormal EEG study.  1. 10 to 15 subclinical electrographic seizures per hour recorded over the right posterior quadrant.  2. Potential seizure focus in the right parietal region.  3. Structural or functional abnormality in the right hemisphere.  4. Moderate diffuse nonspecific cerebral dysfunction.    Marquis Tracy MD  EEG / Epilepsy Attending Physician

## 2020-07-02 NOTE — PROGRESS NOTE ADULT - ASSESSMENT
82 year old female with PMHx of multiple bi-hemispheric strokes of unknown etiology (w/u includes outpatient ILR which was negative, follows with Dr. Lawrence), s/p R carotid endarterectomy, usually AOx2-3, hx of seizures (multiple GTCs in 2012, weaned off keppra 4 years ago), CAD, multiple bypass tx on ASA 81 who was brought to NS from [place] for concern for seizures. Per family, patient has been feeling "off" for about 1 week, with intermittent visual hallucinations (sees animals and scintillating scotomas), saying things that don't make sense, also having L head turning and staring spells. Per daughter patient was home on day of presentation and had acute onset of visual hallucinations, noted to have unsteady gait and left sided shaking.  She was taken to , where CTH showed chronic infarcts.  She was given Ativan 2mg IVP for left sided shaking, became lethargic and hypotensive but improved rapidly. She was transferred to [facility] for cEEG monitoring. She was in focal status per EEG read arising from R Temporal Lobe. Stroke neurology was consulted for MRI showing acute R occipital lobe PCA infarct.     Impression: Focal Status arising from R Temporal Lobe, acute stroke of unknown etiology, possibly the source of her hallucinations and current focal status      Recommendations:    Plan:  [x] continue Vimpat 75mg q12h IV  [x] continue Briviact 50mg bid IV  [] vEEG monitoring  [x] MR head w/o contrast - noted R occipital infarction; stroke neurology consulted with no acute intervention  [x] f/u UA, COVID19  [x] C/w Aspirin 81mg qd po  [x] DVT PPx : Lovenox 40 SQ, Diet Dash 82 year old female with PMHx of multiple bi-hemispheric strokes of unknown etiology (w/u includes outpatient ILR which was negative, follows with Dr. Lawrence), s/p R carotid endarterectomy, usually AOx2-3, hx of seizures (multiple GTCs in 2012, weaned off keppra 4 years ago), CAD, multiple bypass tx on ASA 81 who was brought to NS from [place] for concern for seizures. Per family, patient has been feeling "off" for about 1 week, with intermittent visual hallucinations (sees animals and scintillating scotomas), saying things that don't make sense, also having L head turning and staring spells. Per daughter patient was home on day of presentation and had acute onset of visual hallucinations, noted to have unsteady gait and left sided shaking.  She was taken to , where CTH showed chronic infarcts.  She was given Ativan 2mg IVP for left sided shaking, became lethargic and hypotensive but improved rapidly. She was transferred to [facility] for cEEG monitoring. She was in focal status per EEG read arising from R Temporal Lobe. Stroke neurology was consulted for MRI showing acute R occipital lobe PCA infarct.     Impression: Focal Status arising from R Temporal Lobe, acute stroke of unknown etiology, possibly the source of her hallucinations and current focal status. Remains in status with still frequent focal seizures. Will try to improve duration of time between seizures.      Plan:  [x] increase Vimpat to 75mg IV TID  [x] increase Briviact 50mg IV TID  [] vEEG monitoring  [x] MR head w/o contrast - noted R occipital infarction; stroke neurology consulted with no acute intervention  [x] f/u UA, COVID19  [x] C/w Aspirin 81mg qd po  [x] DVT PPx : Lovenox 40 SQ, Diet Dash 82 year old female with PMHx of multiple bi-hemispheric strokes of unknown etiology (w/u includes outpatient ILR which was negative, follows with Dr. Lawrence), s/p R carotid endarterectomy, usually AOx2-3, hx of seizures (multiple GTCs in 2012, weaned off keppra 4 years ago), CAD, multiple bypass tx on ASA 81 who was brought to NS from [place] for concern for seizures. Per family, patient has been feeling "off" for about 1 week, with intermittent visual hallucinations (sees animals and scintillating scotomas), saying things that don't make sense, also having L head turning and staring spells. Per daughter patient was home on day of presentation and had acute onset of visual hallucinations, noted to have unsteady gait and left sided shaking.  She was taken to , where CTH showed chronic infarcts.  She was given Ativan 2mg IVP for left sided shaking, became lethargic and hypotensive but improved rapidly. She was transferred to [facility] for cEEG monitoring. She was in focal status per EEG read arising from R Temporal Lobe. Stroke neurology was consulted for MRI showing acute R occipital lobe PCA infarct.     Impression: Focal Status arising from R Temporal Lobe, acute stroke of unknown etiology, possibly the source of her hallucinations and current focal status. Remains in status with still frequent focal seizures. Will try to improve duration of time between seizures.      Plan:  [x] increase Vimpat to 75mg IV TID  [x] increase Briviact 50mg IV TID  [] vEEG monitoring  [ ] no ativan; if agitated, will give zyprexa  [x] MR head w/o contrast - noted R occipital infarction; stroke neurology consulted with no acute intervention  [x] f/u UA, COVID19  [x] C/w Aspirin 81mg qd po  [x] DVT PPx : Lovenox 40 SQ, Diet Dash

## 2020-07-02 NOTE — CHART NOTE - NSCHARTNOTEFT_GEN_A_CORE
Pt witntessed to have multiple episodes of L gaze deviation throughout the night, lasing for about 60 seconds. Given Pt's EEG earlier in the day, which demonstrated focal status of R partial onset, there is suspicion that pt is persistently in focal status. Pt was given   Vimpat 100mg IV load. At baseline pt is A&O 2-3. Pt witnessed to have multiple episodes of L gaze deviation and staring episodes throughout the night, lasing for about 60 seconds. Given Pt's EEG earlier in the day, which demonstrated focal status of R partial onset, there is suspicion that pt is persistently in focal status. Pt was given Vimpat 100mg IV load. At baseline pt is A&O 2-3. Plan of care discussed with Neurology Resident. Pt is neurologically stable, will continue to monitor.

## 2020-07-02 NOTE — SWALLOW BEDSIDE ASSESSMENT ADULT - COMMENTS
Of note, daughter states prior to 1 week pt was independently ambulating and doing "yoga routines." Taken to [facility], where CTH with chronic infarcts. Given Ativan 2mg IVP for left sided shaking became lethargic and hypotensive but improved rapidly. Tx to [facility] for cEEG monitoring. Seen in ED in NAD, alert, awake, L hemiparesis.    CT Brain 6/30 reported "No hemorrhagic lesion is identified. There are multiple chronic appearing infarct. There is a large old right MCA infarct with extensive gliosis and encephalomalacia. There are old infarcts in the left MCA distribution, both basal ganglia, and in both occipital lobes. Atrophic changes are noted. Scattered lacunar infarcts in the white matter are indeterminate age."    Per Neurology Chart Event Note 7/2, "Pt witnessed to have multiple episodes of L gaze deviation and staring episodes throughout the night, lasing for about 60 seconds. Given Pt's EEG earlier in the day, which demonstrated focal status of R partial onset, there is suspicion that pt is persistently in focal status. Pt was given Vimpat 100mg IV load. At baseline pt is A&O 2-3. Plan of care discussed with Neurology Resident. Pt is neurologically stable, will continue to monitor."    CXR 6/30 showed "No evidence for focal infiltrate or lobar consolidation."

## 2020-07-02 NOTE — SWALLOW BEDSIDE ASSESSMENT ADULT - SLP GENERAL OBSERVATIONS
Pt encountered awake and alert in bed, A&Ox2-3, on 2L nasal cannula, able to follow simple commands for the purposes of the exam. She was verbally responsive to simple questions however with reduced specificity and intermittent erroneous responses. Vocal quality was WFL and loudness was mildly reduced suspect related to current deconditioned state. Pt states that she has mild difficulty chewing at baseline, and endorses no hx acid reflux. She was brought to an upright position for safe administration of PO trials.

## 2020-07-02 NOTE — SWALLOW BEDSIDE ASSESSMENT ADULT - PHARYNGEAL PHASE
Within functional limits Delayed cough post oral intake/Delayed pharyngeal swallow/Throat clear post oral intake Delayed pharyngeal swallow Within functional limits/Multiple swallows

## 2020-07-02 NOTE — SWALLOW BEDSIDE ASSESSMENT ADULT - SWALLOW EVAL: DIAGNOSIS
Pt being seen for bedside swallow evaluation with consideration of notable CVA/seizure hx from [facility] for acute onset visual hallucinations with unsteady gait and left sided focal shaking, now on EEG monitor. Pt presents with a mild oral stage dysphagia for mechanical soft textures marked by reduced bolus manipulation, slowed AP transfer and prolonged mastication. Mild lingual/palatal stasis was noted upon revisualization cleared to trace with liquid wash. A pharyngeal stage dysphagia was noted for thin liquids via straw marked by suspected delayed initiation resulting in overt s/s penetration/aspiration via immediate throat clear and delayed cough response. Not reduplicated when administered thin liquids via single cup sip. Pt being seen for bedside swallow evaluation with consideration of notable CVA/seizure hx from [place] for acute onset visual hallucinations with unsteady gait and left sided focal shaking, now on EEG monitor. Pt presents with a mild oral stage dysphagia for mechanical soft textures marked by reduced bolus manipulation, slowed AP transfer and prolonged mastication. Mild lingual/palatal stasis was noted upon revisualization cleared to trace with liquid wash. A pharyngeal stage dysphagia was noted for thin liquids via straw marked by suspected delayed initiation resulting in overt s/s penetration/aspiration via immediate throat clear and delayed cough response. Not reduplicated when administered thin liquids via single cup sip.

## 2020-07-02 NOTE — SWALLOW BEDSIDE ASSESSMENT ADULT - ORAL PREPARATORY PHASE
Within functional limits Anterior loss of bolus/Benefited from small sips provided at slow rate Reduced oral grading/Decreased mastication ability Reduced oral grading

## 2020-07-02 NOTE — SWALLOW BEDSIDE ASSESSMENT ADULT - ORAL PHASE
Decreased anterior-posterior movement of the bolus/Suspected posterior escape Within functional limits Decreased anterior-posterior movement of the bolus Decreased anterior-posterior movement of the bolus/Lingual stasis/Delayed oral transit time/Palatal stasis Lingual stasis

## 2020-07-02 NOTE — SWALLOW BEDSIDE ASSESSMENT ADULT - SLP PERTINENT HISTORY OF CURRENT PROBLEM
Per charting, pt is a 83 yo woman with history of multiple strokes of unknown etiology (w/u inc ILR negative follows with Dr. Lawrence), carotid endarterectomy, mild-mod cognitive dysfx but can be AAOx2-3, hx of seizures (multiple GTCs in 2012, was on keppra but taken off all AEDs for past 4 years), CAD, multiple bypass tx on ASA 81 from [place] for concern for seizures. As per family, patient has been feeling "off" for about 1 week, with intermittent visual hallucinations (sees animals and scintillating scotomas), saying things that don't make sense, also having L head turning and staring spells. Per daughter patient was home today had acute onset of visual hallucinations noted to have unsteady gait and left sided focal shaking.

## 2020-07-02 NOTE — EEG REPORT - NS EEG TEXT BOX
[facility]   COMPREHENSIVE EPILEPSY CENTER   REPORT OF CONTINUOUS VIDEO EEG     [facility]: [address], [place] [address], Ph#: [phone]  [facility]: [id number], [place] [address], Ph#: [phone]  Office: [address] [place] [address], [facility] [address], [place] [address] Ph#: [phone]    Patient Name: SARA FARAH  Age and : 86y (34)  MRN #: [id number]  Location: [place] [address]  Referring Physician: Patricia Plummer    Study Date: 20 - 20  Duration: 23hr 46min    _____________________________________________________________  STUDY INFORMATION    EEG Recording Technique:  The patient underwent continuous Video-EEG monitoring, using Telemetry System hardware on the XLTek Digital System. EEG and video data were stored on a computer hard drive with important events saved in digital archive files. The material was reviewed by a physician (electroencephalographer / epileptologist) on a daily basis. Beni and seizure detection algorithms were utilized and reviewed. An EEG Technician attended to the patient, and was available throughout daytime work hours.  The epilepsy center neurologist was available in person or on call 24-hours per day.    EEG Placement and Labeling of Electrodes:  The EEG was performed utilizing 20 channel referential EEG connections (coronal over temporal over parasagittal montage) using all standard 10-20 electrode placements with EKG, with additional electrodes placed in the inferior temporal region using the modified 10-10 montage electrode placements for elective admissions, or if deemed necessary. Recording was at a sampling rate of 256 samples per second per channel. Time synchronized digital video recording was done simultaneously with EEG recording. A low light infrared camera was used for low light recording.     _____________________________________________________________  HISTORY    Patient is a 86y old  Female who presents with a chief complaint of seizures    PERTINENT MEDICATION:  MEDICATIONS  (STANDING):  aspirin enteric coated 81 milliGRAM(s) Oral daily  atorvastatin 40 milliGRAM(s) Oral at bedtime  brivaracetam  IVPB 100 milliGRAM(s) IV Intermittent once  citalopram 10 milliGRAM(s) Oral daily  enoxaparin Injectable 40 milliGRAM(s) SubCutaneous every 24 hours  lacosamide 75 milliGRAM(s) Oral two times a day  memantine 5 milliGRAM(s) Oral two times a day  metoprolol succinate ER 50 milliGRAM(s) Oral daily  sodium chloride 0.9%. 1000 milliLiter(s) (85 mL/Hr) IV Continuous <Continuous>    _____________________________________________________________  INTERPRETATION      Interpretation:    Starting: Day 1      2020      Time: 08:00     Duration: 23:46    Daily EEG Visual Analysis  FINDINGS:  The background was continuous, diffuse delta and theta activity, spontaneously variable and reactive. During wakefulness, the posterior dominant rhythm consisted of a well-modulated 7.5 Hz activity in the left posterior quadrant, with amplitude to 30 uV, that attenuated to eye opening.  Low amplitude frontal beta was noted in wakefulness.    Background Slowing:  -Continuous diffuse theta and polymorphic delta slowing.    Focal Slowing:   -Continuous polymorphic delta and theta slowing in the right hemisphere    Sleep Background:    Other Non-Epileptiform Findings:  None were present.    Interictal Epileptiform Activity:   -Lateralized Periodic Discharges (LPDs) in the right parietal region, broadly contoured, maximum P4, at frequency of 0.5 Hz    Events:  -8-10 subclinical electrographic seizures per hour recorded over the right posterior quadrant, with EEG onset in the right hemisphere with the development of low voltage beta activity or sharply contoured alpha activity in the right hemisphere, maximum in the right posterior quadrant, which evolves into rhythmic delta activity, maximum in the right parasagittal region, maximum P4.  Duration 60-90 seconds    Artifacts:  Intermittent myogenic and movement artifacts were noted.    ECG:  The heart rate on single channel ECG was predominantly between 60-80 BPM.    _____________________________________________________________  EEG SUMMARY/CLASSIFICATION  Abnormal EEG in the awake, drowsy and asleep states.  -8-10 subclinical electrographic seizures per hour recorded over the right posterior quadrant, with EEG onset in the right hemisphere with the development of low voltage beta activity or sharply contoured alpha activity in the right hemisphere, maximum in the right posterior quadrant, which evolves into rhythmic delta activity, maximum in the right parasagittal region, maximum P4.  Duration 60-90 seconds  -Lateralized Periodic Discharges (LPDs) in the right parietal region, broadly contoured, maximum P4, at frequency of 0.5 Hz  -Continuous polymorphic delta and theta slowing in the right hemisphere  -Continuous diffuse theta and polymorphic delta slowing.  _____________________________________________________________  EEG IMPRESSION/CLINICAL CORRELATE  Abnormal EEG study.  1. 8-10 subclinical electrographic seizures per hour recorded over the right posterior quadrant.  2. Potential seizure focus in the right parietal region.  3. Structural or functional abnormality in the right hemisphere.  4. Moderate diffuse nonspecific cerebral dysfunction.    Marquis Tracy MD  EEG / Epilepsy Attending Physician

## 2020-07-02 NOTE — PROGRESS NOTE ADULT - ATTENDING COMMENTS
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

## 2020-07-02 NOTE — PROGRESS NOTE ADULT - SUBJECTIVE AND OBJECTIVE BOX
*************************************  NEUROLOGY PROGRESS NOTE  **************************************    SARA FARAH  Female  MRN-[id number]    Subjective: Episodes of left gaze deviation throughout night lasting ~60 seconds. Given concern for focal status, patient given 100mg IV Vimpat load. Otherwise stable overnight. Patient seen this morning.    ROS:      VITAL SIGNS:  Vital Signs Last 24 Hrs  T(C): 36.6 (02 Jul 2020 04:46), Max: 36.7 (01 Jul 2020 19:32)  T(F): 97.9 (02 Jul 2020 04:46), Max: 98 (01 Jul 2020 19:32)  HR: 71 (02 Jul 2020 04:46) (70 - 76)  BP: 157/83 (02 Jul 2020 04:46) (122/76 - 157/83)  BP(mean): --  RR: 18 (02 Jul 2020 04:46) (18 - 18)  SpO2: 98% (02 Jul 2020 04:46) (98% - 100%)    PHYSICAL EXAMINATION:  General: Well-developed, well nourished, in no acute distress.  Eyes: Conjunctiva and sclera clear.  Neck: supple, nontender, good ROM  Lungs: no respiratory distress  Neurologic:  - Mental Status:  Alert, awake, oriented to person, place, and time; Speech is fluent with intact naming, repetition, and comprehension  - Cranial Nerves II-XII:  VFF, No nystagmus or APD noted, EOMI, PERRLA, V1-V3 intact, no facial asymmetry, t/p midline, SCM/trap intact.  - Motor:  Strength is 5/5 throughout.  There is no pronator drift.  Normal muscle bulk and tone throughout.  - Reflexes:  2+ and symmetric at the biceps, triceps, brachioradialis, knees, and ankles.  Plantar responses flexor.  - Sensory:  Intact to light touch, pin prick, vibration, and joint-position sense throughout.  - Coordination:  Finger-nose-finger and heel-knee-shin intact without dysmetria.  Rapid alternating hand movements intact.  - Gait:   Normal steps, base, arm swing, and turning.  Heel and toe walking are normal.  Tandem gait is normal.  Romberg testing is negative.    LABS:                          12.9   5.54  )-----------( 110      ( 01 Jul 2020 07:54 )             38.5     07-01    140  |  108  |  12  ----------------------------<  94  4.1   |  22  |  0.76    Ca    9.0      01 Jul 2020 07:53  Phos  3.2     07-01  Mg     2.2     07-01    TPro  7.1  /  Alb  3.9  /  TBili  1.2  /  DBili  x   /  AST  22  /  ALT  19  /  AlkPhos  80  07-01    PT/INR - ( 30 Jun 2020 15:17 )   PT: 12.7 sec;   INR: 1.11 ratio         PTT - ( 30 Jun 2020 15:17 )  PTT:28.8 sec      RADIOLOGY & ADDITIONAL STUDIES: *************************************  NEUROLOGY PROGRESS NOTE  **************************************    SARA FARAH  Female  MRN-[id number]    Subjective: Episodes of left gaze deviation throughout night lasting ~60 seconds. Given concern for focal status, patient given 100mg IV Vimpat load. Otherwise stable overnight. Patient seen this morning. Voices that feels okay but history limited 2/2 mental status. Witnessed 10 second focal seizure while at bedside.    ROS: No pertinent positives.      VITAL SIGNS:  Vital Signs Last 24 Hrs  T(C): 36.6 (02 Jul 2020 04:46), Max: 36.7 (01 Jul 2020 19:32)  T(F): 97.9 (02 Jul 2020 04:46), Max: 98 (01 Jul 2020 19:32)  HR: 71 (02 Jul 2020 04:46) (70 - 76)  BP: 157/83 (02 Jul 2020 04:46) (122/76 - 157/83)  BP(mean): --  RR: 18 (02 Jul 2020 04:46) (18 - 18)  SpO2: 98% (02 Jul 2020 04:46) (98% - 100%)    PHYSICAL EXAMINATION:  General: Well-developed, well nourished, in no acute distress.  Eyes: Conjunctiva and sclera clear.  Neck: supple, nontender, good ROM  Lungs: no respiratory distress  Neurologic:  MS: Eyes closed but opens to name easily, alert, , cannot cross midline, Extinction present on L. unable to do multistep commands. Concerns for focal status  	CNs: eyes moving spontaneously in all directions. L Homonymous Hemianopsia. well developed masseter muscles b/l. No facial asymmetry b/l. Symmetric palate elevation in midline. Gag reflex deferred. Tongue midline, normal movements, no atrophy.  	Motor: LUE with subtle cerebellar drift, 4/5, LLE 3/5, down and out  	Sensation: diminished to touch on left  L babinski present    LABS:                          12.9   5.54  )-----------( 110      ( 01 Jul 2020 07:54 )             38.5     07-01    140  |  108  |  12  ----------------------------<  94  4.1   |  22  |  0.76    Ca    9.0      01 Jul 2020 07:53  Phos  3.2     07-01  Mg     2.2     07-01    TPro  7.1  /  Alb  3.9  /  TBili  1.2  /  DBili  x   /  AST  22  /  ALT  19  /  AlkPhos  80  07-01    PT/INR - ( 30 Jun 2020 15:17 )   PT: 12.7 sec;   INR: 1.11 ratio         PTT - ( 30 Jun 2020 15:17 )  PTT:28.8 sec      RADIOLOGY & ADDITIONAL STUDIES:

## 2020-07-02 NOTE — SWALLOW BEDSIDE ASSESSMENT ADULT - ASR SWALLOW ASPIRATION MONITOR
cough/fever/change of breathing pattern/gurgly voice/Monitor for s/s aspiration/laryngeal penetration. If noted:  D/C p.o. intake, provide non-oral nutrition/hydration/meds, and contact this service @ [phone]/pneumonia/throat clearing/upper respiratory infection

## 2020-07-03 NOTE — PROGRESS NOTE ADULT - ASSESSMENT
82 year old female with PMHx of multiple bi-hemispheric strokes of unknown etiology (w/u includes outpatient ILR which was negative, follows with Dr. Lawrence), s/p R carotid endarterectomy, usually AOx2-3, hx of seizures (multiple GTCs in 2012, weaned off keppra 4 years ago), CAD, multiple bypass tx on ASA 81 who was brought to NS from [place] for concern for seizures. Per family, patient has been feeling "off" for about 1 week, with intermittent visual hallucinations (sees animals and scintillating scotomas), saying things that don't make sense, also having L head turning and staring spells. Per daughter patient was home on day of presentation and had acute onset of visual hallucinations, noted to have unsteady gait and left sided shaking.  She was taken to , where CTH showed chronic infarcts.  She was given Ativan 2mg IVP for left sided shaking, became lethargic and hypotensive but improved rapidly. She was transferred to [facility] for cEEG monitoring. She was in focal status per EEG read arising from R Temporal Lobe. Stroke neurology was consulted for MRI showing acute R occipital lobe PCA infarct.     Impression: Focal Status arising from R Temporal Lobe, acute stroke of unknown etiology, possibly the source of her hallucinations and current focal status. Remains in status with still frequent focal seizures. Will try to improve duration of time between seizures.      Plan:  [x] increase Vimpat to 75mg IV TID  [x] increase Briviact 50mg IV TID  [] vEEG monitoring  [ ] no ativan; if agitated, will give zyprexa  [x] MR head w/o contrast - noted R occipital infarction; stroke neurology consulted with no acute intervention  [x] f/u UA, COVID19  [x] C/w Aspirin 81mg qd po  [x] DVT PPx : Lovenox 40 SQ, Diet Dash 82 year old female with PMHx of multiple bi-hemispheric strokes of unknown etiology (w/u includes outpatient ILR which was negative, follows with Dr. Lawrence), s/p R carotid endarterectomy, usually AOx2-3, hx of seizures (multiple GTCs in 2012, weaned off keppra 4 years ago), CAD, multiple bypass tx on ASA 81 who was brought to NS from [place] for concern for seizures. Per family, patient has been feeling "off" for about 1 week, with intermittent visual hallucinations (sees animals and scintillating scotomas), saying things that don't make sense, also having L head turning and staring spells. Per daughter patient was home on day of presentation and had acute onset of visual hallucinations, noted to have unsteady gait and left sided shaking.  She was taken to , where CTH showed chronic infarcts.  She was given Ativan 2mg IVP for left sided shaking, became lethargic and hypotensive but improved rapidly. She was transferred to [facility] for cEEG monitoring. She was in focal status per EEG read arising from R Temporal Lobe. Stroke neurology was consulted for MRI showing acute R occipital lobe PCA infarct.     Impression: Focal Status arising from R Temporal Lobe, acute stroke of unknown etiology, possibly the source of her hallucinations and current focal status. Remains in status with still frequent focal seizures. Will try to improve duration of time between seizures.      Plan:  [x] increase Vimpat to 75mg IV TID  [x] increase Briviact 50mg IV TID  [x] s/p fosphenytoin 500 mg IV x 1 on 7/3  [] vEEG monitoring  [ ] no ativan; if agitated, will give zyprexa  [x] MR head w/o contrast - noted R occipital infarction; stroke neurology consulted with no acute intervention  [x] f/u UA, COVID19  [x] C/w Aspirin 81mg qd po  [x] DVT PPx : Lovenox 40 SQ, Diet Dash

## 2020-07-03 NOTE — PROGRESS NOTE ADULT - SUBJECTIVE AND OBJECTIVE BOX
MRN-[id number]  Patient is a 86y old  Female who presents with a chief complaint of   HPI:  83 yo woman with history of multiple strokes of unknown etiology (w/u inc ILR negative follows with Dr. Lawrence), carotid endarterectomy, mild-mod cognitive dysfx but can be AAOx2-3, hx of seizures (multiple GTCs in 2012, was on keppra but taken off all AEDs for past 4 years), CAD, multiple bypass tx on ASA 81 from [place] for concern for seizures. As per family, patient has been feeling "off" for about 1 week, with intermittent visual hallucinations (sees animals and scintillating scotomas), saying things that don't make sense, also having L head turning and staring spells. Per daughter patient was home today had acute onset of visual hallucinations noted to have unsteady gait and left sided focal shaking. Per daughter, prior to 1 week pt was independently ambulating and doing "yoga routines." Taken to [place], where CTH with chronic infarcts. Given Ativan 2mg IVP for left sided shaking became lethargic and hypotensive but improved rapidly. Tx to [facility] for cEEG monitoring. Seen in ED in NAD, alert, awake, L hemiparesis. (30 Jun 2020 14:15)    Interval History: no new events o/n.      PAST MEDICAL & SURGICAL HISTORY:  Dementia  Breast cancer  Peripheral vision loss  CAD (coronary artery disease)  HLD (hyperlipidemia)  Seizure  TIA (transient ischemic attack)  H/O lumpectomy: Left breast  S/P tonsillectomy  S/P carotid endarterectomy: right, 2006  S/P CABG x 5: 2006    FAMILY HISTORY:  Family history of heart disease (Sibling)  Family history of ovarian cancer  Family history of heart attack    Social Hx:  Nonsmoker, no drug or alcohol use    Home Medications:  Aspirin Enteric Coated 81 mg oral delayed release tablet: 1 tab(s) orally once a day (10 Sep 2019 15:31)  atorvastatin 40 mg oral tablet: 1 tab(s) orally once a day (10 Sep 2019 15:31)  Calcium 600+D: 1 tab(s) orally once a day (10 Sep 2019 15:31)  Centrum oral tablet: 1 tab(s) orally once a day (10 Sep 2019 15:31)  citalopram 10 mg oral tablet: 1 tab(s) orally once a day (10 Sep 2019 15:31)  Co Q-10 100 mg oral capsule: 2 cap(s) orally once a day (10 Sep 2019 15:31)  Fish Oil 1000 mg oral capsule: 2 cap(s) orally once a day (10 Sep 2019 15:31)  isosorbide mononitrate 30 mg oral tablet, extended release: 1 tab(s) orally 2 times a day (30 Jun 2020 16:37)  memantine 5 mg oral tablet: 1 tab(s) orally once a day (10 Sep 2019 15:31)  Metoprolol Succinate ER 50 mg oral tablet, extended release: 1 tab(s) orally once a day (30 Jun 2020 16:38)  Vitamin B-12 500 mcg oral tablet: 1 tab(s) orally once a day (10 Sep 2019 15:31)    MEDICATIONS  (STANDING):  aspirin enteric coated 81 milliGRAM(s) Oral daily  atorvastatin 40 milliGRAM(s) Oral at bedtime  brivaracetam  IVPB 50 milliGRAM(s) IV Intermittent <User Schedule>  citalopram 10 milliGRAM(s) Oral daily  enoxaparin Injectable 40 milliGRAM(s) SubCutaneous every 24 hours  lacosamide IVPB 75 milliGRAM(s) IV Intermittent <User Schedule>  memantine 5 milliGRAM(s) Oral two times a day  metoprolol succinate ER 50 milliGRAM(s) Oral daily  sodium chloride 0.9%. 1000 milliLiter(s) (85 mL/Hr) IV Continuous <Continuous>    MEDICATIONS  (PRN):  lacosamide IVPB 100 milliGRAM(s) IV Intermittent once PRN for seizures refractory to Ativan >  3mins  LORazepam   Injectable 1 milliGRAM(s) IV Push once PRN Seizure GTC or focal  > 3 mins    Allergies  lidocaine (Other)    Intolerances      REVIEW OF SYSTEMS  General:	  Skin/Breast:	  Ophthalmologic:  ENMT:	  Respiratory and Thorax:	  Cardiovascular:	  Gastrointestinal:	  Genitourinary:	  Musculoskeletal:	  Neurological:	  Psychiatric:	  Hematology/Lymphatics:	  Endocrine:	  Allergic/Immunologic:	    ROS: Pertinent positives in HPI, all other ROS were reviewed and are negative.      Vital Signs Last 24 Hrs  T(C): 36.7 (03 Jul 2020 04:28), Max: 37.3 (02 Jul 2020 23:41)  T(F): 98 (03 Jul 2020 04:28), Max: 99.1 (02 Jul 2020 23:41)  HR: 76 (03 Jul 2020 04:28) (70 - 80)  BP: 138/78 (03 Jul 2020 04:28) (123/76 - 154/81)  BP(mean): --  RR: 18 (03 Jul 2020 04:28) (18 - 18)  SpO2: 98% (03 Jul 2020 04:28) (98% - 100%)    PHYSICAL EXAMINATION:  General: Well-developed, well nourished, in no acute distress.  Eyes: Conjunctiva and sclera clear.  Neck: supple, nontender, good ROM  Lungs: no respiratory distress  Neurologic:  MS: Eyes closed but opens to name easily, alert, , cannot cross midline, Extinction present on L. unable to do multistep commands. Concerns for focal status  	CNs: eyes moving spontaneously in all directions. L Homonymous Hemianopsia. well developed masseter muscles b/l. No facial asymmetry b/l. Symmetric palate elevation in midline. Gag reflex deferred. Tongue midline, normal movements, no atrophy.  	Motor: LUE with subtle cerebellar drift, 4/5, LLE 3/5, down and out  	Sensation: diminished to touch on left  L babinski present    Labs:   cbc                      12.9   5.54  )-----------( 110      ( 01 Jul 2020 07:54 )             38.5     Chem      Coags  [id number] Chol 115 LDL 53 HDL 47<L> Trig 75  A1C  CardiacMarkers    LFTs  UA  CSF  Immunological Labs    Radiology:  -CT Head  -MRI brain  -MRA brain/Carotids  -EEG  -EKG  -TTE/TONNY [id number]  Patient is a 86y old  Female who presents with a chief complaint of   HPI:  81 yo woman with history of multiple strokes of unknown etiology (w/u inc ILR negative follows with Dr. Lawrence), carotid endarterectomy, mild-mod cognitive dysfx but can be AAOx2-3, hx of seizures (multiple GTCs in 2012, was on keppra but taken off all AEDs for past 4 years), CAD, multiple bypass tx on ASA 81 from [place] for concern for seizures. As per family, patient has been feeling "off" for about 1 week, with intermittent visual hallucinations (sees animals and scintillating scotomas), saying things that don't make sense, also having L head turning and staring spells. Per daughter patient was home today had acute onset of visual hallucinations noted to have unsteady gait and left sided focal shaking. Per daughter, prior to 1 week pt was independently ambulating and doing "yoga routines." Taken to [place], where CTH with chronic infarcts. Given Ativan 2mg IVP for left sided shaking became lethargic and hypotensive but improved rapidly. Tx to [facility] for cEEG monitoring. Seen in ED in NAD, alert, awake, L hemiparesis. (30 Jun 2020 14:15)    Interval History: was in focal Status arising from R Temporal Lobe.    PAST MEDICAL & SURGICAL HISTORY:  Dementia  Breast cancer  Peripheral vision loss  CAD (coronary artery disease)  HLD (hyperlipidemia)  Seizure  TIA (transient ischemic attack)  H/O lumpectomy: Left breast  S/P tonsillectomy  S/P carotid endarterectomy: right, 2006  S/P CABG x 5: 2006    FAMILY HISTORY:  Family history of heart disease (Sibling)  Family history of ovarian cancer  Family history of heart attack    Social Hx:  Nonsmoker, no drug or alcohol use    Home Medications:  Aspirin Enteric Coated 81 mg oral delayed release tablet: 1 tab(s) orally once a day (10 Sep 2019 15:31)  atorvastatin 40 mg oral tablet: 1 tab(s) orally once a day (10 Sep 2019 15:31)  Calcium 600+D: 1 tab(s) orally once a day (10 Sep 2019 15:31)  Centrum oral tablet: 1 tab(s) orally once a day (10 Sep 2019 15:31)  citalopram 10 mg oral tablet: 1 tab(s) orally once a day (10 Sep 2019 15:31)  Co Q-10 100 mg oral capsule: 2 cap(s) orally once a day (10 Sep 2019 15:31)  Fish Oil 1000 mg oral capsule: 2 cap(s) orally once a day (10 Sep 2019 15:31)  isosorbide mononitrate 30 mg oral tablet, extended release: 1 tab(s) orally 2 times a day (30 Jun 2020 16:37)  memantine 5 mg oral tablet: 1 tab(s) orally once a day (10 Sep 2019 15:31)  Metoprolol Succinate ER 50 mg oral tablet, extended release: 1 tab(s) orally once a day (30 Jun 2020 16:38)  Vitamin B-12 500 mcg oral tablet: 1 tab(s) orally once a day (10 Sep 2019 15:31)    MEDICATIONS  (STANDING):  aspirin enteric coated 81 milliGRAM(s) Oral daily  atorvastatin 40 milliGRAM(s) Oral at bedtime  brivaracetam  IVPB 50 milliGRAM(s) IV Intermittent <User Schedule>  citalopram 10 milliGRAM(s) Oral daily  enoxaparin Injectable 40 milliGRAM(s) SubCutaneous every 24 hours  lacosamide IVPB 75 milliGRAM(s) IV Intermittent <User Schedule>  memantine 5 milliGRAM(s) Oral two times a day  metoprolol succinate ER 50 milliGRAM(s) Oral daily  sodium chloride 0.9%. 1000 milliLiter(s) (85 mL/Hr) IV Continuous <Continuous>    MEDICATIONS  (PRN):  lacosamide IVPB 100 milliGRAM(s) IV Intermittent once PRN for seizures refractory to Ativan >  3mins  LORazepam   Injectable 1 milliGRAM(s) IV Push once PRN Seizure GTC or focal  > 3 mins    Allergies  lidocaine (Other)    Intolerances      REVIEW OF SYSTEMS  General:	  Skin/Breast:	  Ophthalmologic:  ENMT:	  Respiratory and Thorax:	  Cardiovascular:	  Gastrointestinal:	  Genitourinary:	  Musculoskeletal:	  Neurological:	  Psychiatric:	  Hematology/Lymphatics:	  Endocrine:	  Allergic/Immunologic:	    ROS: Pertinent positives in HPI, all other ROS were reviewed and are negative.      Vital Signs Last 24 Hrs  T(C): 36.7 (03 Jul 2020 04:28), Max: 37.3 (02 Jul 2020 23:41)  T(F): 98 (03 Jul 2020 04:28), Max: 99.1 (02 Jul 2020 23:41)  HR: 76 (03 Jul 2020 04:28) (70 - 80)  BP: 138/78 (03 Jul 2020 04:28) (123/76 - 154/81)  BP(mean): --  RR: 18 (03 Jul 2020 04:28) (18 - 18)  SpO2: 98% (03 Jul 2020 04:28) (98% - 100%)    PHYSICAL EXAMINATION:  General: Well-developed, well nourished, in no acute distress.  Eyes: Conjunctiva and sclera clear.  Neck: supple, nontender, good ROM  Lungs: no respiratory distress  Neurologic:  MS: Eyes closed but opens to name easily, alert, , cannot cross midline, Extinction present on L. unable to do multistep commands. Concerns for focal status  	CNs: eyes moving spontaneously in all directions. L Homonymous Hemianopsia. well developed masseter muscles b/l. No facial asymmetry b/l. Symmetric palate elevation in midline. Gag reflex deferred. Tongue midline, normal movements, no atrophy.  	Motor: LUE with subtle cerebellar drift, 4/5, LLE 3/5, down and out  	Sensation: diminished to touch on left  L babinski present    Labs:   cbc                      12.9   5.54  )-----------( 110      ( 01 Jul 2020 07:54 )             38.5     Chem      Coags  [id number] Chol 115 LDL 53 HDL 47<L> Trig 75  A1C  CardiacMarkers    LFTs  UA  CSF  Immunological Labs    Radiology:  -CT Head  -MRI brain  -MRA brain/Carotids  -EEG  -EKG  -TTE/TONNY

## 2020-07-03 NOTE — EEG REPORT - NS EEG TEXT BOX
[facility]   COMPREHENSIVE EPILEPSY CENTER   REPORT OF CONTINUOUS VIDEO EEG     [facility]: [address], [place] [address], Ph#: [phone]  [facility]: [id number], [place] [address], Ph#: [phone]  Office: [address] [place] [address], [facility] [address], [place] [address] Ph#: [phone]    Patient Name: SARA FARAH  Age and : 86y ([birth year])  MRN #: [id number]  Location: [place] [address]  Referring Physician: Patricia Plummer    Study Date: 20 - 20 08:00-08:00  Duration: 24 hours    _____________________________________________________________  STUDY INFORMATION    EEG Recording Technique:  The patient underwent continuous Video-EEG monitoring, using Telemetry System hardware on the XLTek Digital System. EEG and video data were stored on a computer hard drive with important events saved in digital archive files. The material was reviewed by a physician (electroencephalographer / epileptologist) on a daily basis. Beni and seizure detection algorithms were utilized and reviewed. An EEG Technician attended to the patient, and was available throughout daytime work hours.  The epilepsy center neurologist was available in person or on call 24-hours per day.    EEG Placement and Labeling of Electrodes:  The EEG was performed utilizing 20 channel referential EEG connections (coronal over temporal over parasagittal montage) using all standard 10-20 electrode placements with EKG, with additional electrodes placed in the inferior temporal region using the modified 10-10 montage electrode placements for elective admissions, or if deemed necessary. Recording was at a sampling rate of 256 samples per second per channel. Time synchronized digital video recording was done simultaneously with EEG recording. A low light infrared camera was used for low light recording.     _____________________________________________________________  HISTORY    Patient is a 86y old  Female who presents with a chief complaint of seizures    PERTINENT MEDICATION:  aspirin enteric coated 81 milliGRAM(s) Oral daily  atorvastatin 40 milliGRAM(s) Oral at bedtime  brivaracetam  IVPB 50 milliGRAM(s) IV Intermittent <User Schedule>  cefTRIAXone   IVPB 1000 milliGRAM(s) IV Intermittent every 24 hours  citalopram 10 milliGRAM(s) Oral daily  enoxaparin Injectable 40 milliGRAM(s) SubCutaneous every 24 hours  lacosamide IVPB 75 milliGRAM(s) IV Intermittent <User Schedule>  memantine 5 milliGRAM(s) Oral two times a day  metoprolol succinate ER 50 milliGRAM(s) Oral daily  sodium chloride 0.9%. 1000 milliLiter(s) (85 mL/Hr) IV Continuous <Continuous>    _____________________________________________________________  INTERPRETATION      Interpretation:    Starting: Day 2      [id number]      Time: 08:00     Duration: 24 hours    Daily EEG Visual Analysis  FINDINGS:  The background was continuous, diffuse delta and theta activity, spontaneously variable and reactive. During wakefulness, the posterior dominant rhythm consisted of a well-modulated 7.5 Hz activity in the left posterior quadrant, with amplitude to 30 uV, that attenuated to eye opening.  Low amplitude frontal beta was noted in wakefulness.    Background Slowing:  -Continuous diffuse theta and polymorphic delta slowing.    Focal Slowing:   -Continuous polymorphic delta and theta slowing in the right hemisphere    Sleep Background:    Other Non-Epileptiform Findings:  None were present.    Interictal Epileptiform Activity:   -Lateralized Periodic Discharges (LPDs) in the right parietal region, broadly contoured, maximum P4, at frequency of 0.5 Hz    Events:  -4-6 subclinical electrographic seizures per hour recorded over the right posterior quadrant, with EEG onset in the right hemisphere with the development of low voltage beta activity or sharply contoured alpha activity in the right hemisphere, maximum in the right posterior quadrant, which evolves into rhythmic delta activity, maximum in the right parasagittal region, maximum P4.  Duration 60-90 seconds    Artifacts:  Intermittent myogenic and movement artifacts were noted.    ECG:  The heart rate on single channel ECG was predominantly between 60-80 BPM.    _____________________________________________________________  EEG SUMMARY/CLASSIFICATION  Abnormal EEG in the awake, drowsy and asleep states.  -4-6 subclinical electrographic seizures per hour recorded over the right posterior quadrant, with EEG onset in the right hemisphere with the development of low voltage beta activity or sharply contoured alpha activity in the right hemisphere, maximum in the right posterior quadrant, which evolves into rhythmic delta activity, maximum in the right parasagittal region, maximum P4.  Duration 60-90 seconds  -Lateralized Periodic Discharges (LPDs) in the right parietal region, broadly contoured, maximum P4, at frequency of 0.5 Hz  -Continuous polymorphic delta and theta slowing in the right hemisphere  -Continuous diffuse theta and polymorphic delta slowing.  _____________________________________________________________  EEG IMPRESSION/CLINICAL CORRELATE  Abnormal EEG study.  1. 4-6 subclinical electrographic seizures per hour recorded over the right posterior quadrant.  2. Potential seizure focus in the right parietal region.  3. Structural or functional abnormality in the right hemisphere.  4. Moderate diffuse nonspecific cerebral dysfunction.    Patricia Plummer MD  Epilepsy Attending Physician [facility]   COMPREHENSIVE EPILEPSY CENTER   REPORT OF CONTINUOUS VIDEO EEG     [facility]: [address], [place] [address], Ph#: [phone]  [facility]: [id number], [place] [address], Ph#: [phone]  Office: [address] [place] [address], [facility] [address], [place] [address] Ph#: [phone]    Patient Name: SARA FARAH  Age and : 86y (34)  MRN #: [id number]  Location: [place] [address]  Referring Physician: Patricia Plummer    Study Date: 20 - 20 08:00-08:00  Duration: 24 hours    _____________________________________________________________  STUDY INFORMATION    EEG Recording Technique:  The patient underwent continuous Video-EEG monitoring, using Telemetry System hardware on the XLTek Digital System. EEG and video data were stored on a computer hard drive with important events saved in digital archive files. The material was reviewed by a physician (electroencephalographer / epileptologist) on a daily basis. Beni and seizure detection algorithms were utilized and reviewed. An EEG Technician attended to the patient, and was available throughout daytime work hours.  The epilepsy center neurologist was available in person or on call 24-hours per day.    EEG Placement and Labeling of Electrodes:  The EEG was performed utilizing 20 channel referential EEG connections (coronal over temporal over parasagittal montage) using all standard 10-20 electrode placements with EKG, with additional electrodes placed in the inferior temporal region using the modified 10-10 montage electrode placements for elective admissions, or if deemed necessary. Recording was at a sampling rate of 256 samples per second per channel. Time synchronized digital video recording was done simultaneously with EEG recording. A low light infrared camera was used for low light recording.     _____________________________________________________________  HISTORY    Patient is a 86y old  Female who presents with a chief complaint of seizures    PERTINENT MEDICATION:  aspirin enteric coated 81 milliGRAM(s) Oral daily  atorvastatin 40 milliGRAM(s) Oral at bedtime  brivaracetam  IVPB 50 milliGRAM(s) IV Intermittent <User Schedule>  cefTRIAXone   IVPB 1000 milliGRAM(s) IV Intermittent every 24 hours  citalopram 10 milliGRAM(s) Oral daily  enoxaparin Injectable 40 milliGRAM(s) SubCutaneous every 24 hours  lacosamide IVPB 75 milliGRAM(s) IV Intermittent <User Schedule>  memantine 5 milliGRAM(s) Oral two times a day  metoprolol succinate ER 50 milliGRAM(s) Oral daily  sodium chloride 0.9%. 1000 milliLiter(s) (85 mL/Hr) IV Continuous <Continuous>    _____________________________________________________________  INTERPRETATION      Interpretation:    Starting: Day 2      [id number]      Time: 08:00     Duration: 24 hours    Daily EEG Visual Analysis  FINDINGS:  The background was continuous, diffuse delta and theta activity, spontaneously variable and reactive. During wakefulness, the posterior dominant rhythm consisted of a well-modulated 7.5 Hz activity in the left posterior quadrant, with amplitude to 30 uV, that attenuated to eye opening.  Low amplitude frontal beta was noted in wakefulness.    Background Slowing:  -Continuous diffuse theta and polymorphic delta slowing.    Focal Slowing:   -Continuous polymorphic delta and theta slowing in the right hemisphere    Sleep Background:    Other Non-Epileptiform Findings:  None were present.    Interictal Epileptiform Activity:   -Lateralized Periodic Discharges (LPDs) in the right parietal region, broadly contoured, maximum P4, at frequency of 0.5 Hz    Events:  -4-6 subclinical electrographic seizures per hour recorded over the right posterior quadrant, with EEG onset in the right hemisphere with the development of low voltage beta activity or sharply contoured alpha activity in the right hemisphere, maximum in the right posterior quadrant, which evolves into rhythmic delta activity, maximum in the right parasagittal region, maximum P4.  Duration 60-90 seconds    Artifacts:  Intermittent myogenic and movement artifacts were noted.    ECG:  The heart rate on single channel ECG was predominantly between 60-80 BPM.    _____________________________________________________________  EEG SUMMARY/CLASSIFICATION  Abnormal EEG in the awake, drowsy and asleep states.  -4-6 subclinical electrographic seizures per hour recorded over the right posterior quadrant, with EEG onset in the right hemisphere with the development of low voltage beta activity or sharply contoured alpha activity in the right hemisphere, maximum in the right posterior quadrant, which evolves into rhythmic delta activity, maximum in the right parasagittal region, maximum P4.  Duration 60-90 seconds  -Lateralized Periodic Discharges (LPDs) in the right parietal region, broadly contoured, maximum P4, at frequency of 0.5 Hz  -Continuous polymorphic delta and theta slowing in the right hemisphere  -Continuous diffuse theta and polymorphic delta slowing.  _____________________________________________________________  EEG IMPRESSION/CLINICAL CORRELATE  Abnormal EEG study.  1. 4-6 subclinical electrographic seizures per hour recorded over the right posterior quadrant.  2. Potential seizure focus in the right parietal region.  3. Structural or functional abnormality in the right hemisphere.  4. Moderate diffuse nonspecific cerebral dysfunction.    Patricia Plummer MD  Epilepsy Attending Physician

## 2020-07-03 NOTE — PROGRESS NOTE ADULT - ATTENDING COMMENTS
her seizures are decreasing in frequency but she is increasingly sedated  Fosphenytoin 500 x1 now  continue Vimpat 75TID, Briviact 50 TID  continue EEG monitoring

## 2020-07-04 NOTE — SWALLOW BEDSIDE ASSESSMENT ADULT - COMMENTS
Pt seen by this service for prior bedside swallow evaluation 7/2 with recommendation for pureed diet with thin liquids via small single cup sips only.    Discussed with team re: need for re-consult. Will follow up with primary team Monday unless urgent need is encountered before then. Covering physician in agreement with proposed plan.

## 2020-07-04 NOTE — PROGRESS NOTE ADULT - ASSESSMENT
82 year old female with PMHx of multiple bi-hemispheric strokes of unknown etiology (w/u includes outpatient ILR which was negative, follows with Dr. Lawrence), s/p R carotid endarterectomy, usually AOx2-3, hx of seizures (multiple GTCs in 2012, weaned off keppra 4 years ago), CAD, multiple bypass tx on ASA 81 who was brought to [facility] from [place] for concern for seizures. Per family, patient has been feeling "off" for about 1 week, with intermittent visual hallucinations (sees animals and scintillating scotomas), saying things that don't make sense, also having L head turning and staring spells. Per daughter patient was home on day of presentation and had acute onset of visual hallucinations, noted to have unsteady gait and left sided shaking.  She was taken to , where CTH showed chronic infarcts.  She was given Ativan 2mg IVP for left sided shaking, became lethargic and hypotensive but improved rapidly. She was transferred to [facility] for cEEG monitoring. She was in focal status per EEG read arising from R Temporal Lobe. Stroke neurology was consulted for MRI showing acute R occipital lobe PCA infarct.     Impression: Focal Status arising from R Temporal Lobe, acute stroke of unknown etiology, possibly the source of her hallucinations and current focal status. Remains in status with still frequent focal seizures. Will try to improve duration of time between seizures.      Plan:  [x] increase Vimpat to 75mg IV TID  [x] increase Briviact 50mg IV TID  [x] s/p fosphenytoin 500 mg IV x 1 on 7/3  [] vEEG monitoring  [ ] no ativan; if agitated, will give zyprexa  [x] MR head w/o contrast - noted R occipital infarction; stroke neurology consulted with no acute intervention  [x] f/u UA, COVID19  [x] C/w Aspirin 81mg qd po  [x] DVT PPx : Lovenox 40 SQ, Diet Dash 82 year old female with PMHx of multiple bi-hemispheric strokes of unknown etiology (w/u includes outpatient ILR which was negative, follows with Dr. Lawrence), s/p R carotid endarterectomy, usually AOx2-3, hx of seizures (multiple GTCs in , weaned off keppra 4 years ago), CAD, multiple bypass tx on ASA 81 who was brought to NS from [place] for concern for seizures. Per family, patient has been feeling "off" for about 1 week, with intermittent visual hallucinations (sees animals and scintillating scotomas), saying things that don't make sense, also having L head turning and staring spells. Per daughter patient was home on day of presentation and had acute onset of visual hallucinations, noted to have unsteady gait and left sided shaking.  She was taken to , where CTH showed chronic infarcts.  She was given Ativan 2mg IVP for left sided shaking, became lethargic and hypotensive but improved rapidly. She was transferred to [facility] for cEEG monitoring. She was in focal status per EEG read arising from R Temporal Lobe. Stroke neurology was consulted for MRI showing acute R occipital lobe PCA infarct.     Impression: Focal Status arising from R Temporal Lobe, acute stroke of unknown etiology, possibly the source of her hallucinations and current focal status. Remains in status with still frequent focal seizures. Will try to improve duration of time between seizures.      Plan:  [x] vEE-6 subclinical seizures per hour  [x] Continue Vimpat to 75mg IV TID  [x] Decrease Briviact to 50mg IV BID  [x] Loaded with Depakote 500mg IV at 6:30am and continue 250mg IV TID as maintenance  [ ] no ativan; if agitated, will give zyprexa  [x] MR head w/o contrast - noted R occipital infarction; stroke neurology consulted with no acute intervention  [x] f/u UA, COVID19  [x] C/w Aspirin 81mg qd po  [x] DVT PPx : Lovenox 40 SQ, Diet Dash

## 2020-07-04 NOTE — PROGRESS NOTE ADULT - SUBJECTIVE AND OBJECTIVE BOX
Neurology Progress Note    Interval History: Patient with continued 4-6 subclinical seizures per hour.     Home Medications:  Aspirin Enteric Coated 81 mg oral delayed release tablet: 1 tab(s) orally once a day (10 Sep 2019 15:31)  atorvastatin 40 mg oral tablet: 1 tab(s) orally once a day (10 Sep 2019 15:31)  Calcium 600+D: 1 tab(s) orally once a day (10 Sep 2019 15:31)  Centrum oral tablet: 1 tab(s) orally once a day (10 Sep 2019 15:31)  citalopram 10 mg oral tablet: 1 tab(s) orally once a day (10 Sep 2019 15:31)  Co Q-10 100 mg oral capsule: 2 cap(s) orally once a day (10 Sep 2019 15:31)  Fish Oil 1000 mg oral capsule: 2 cap(s) orally once a day (10 Sep 2019 15:31)  isosorbide mononitrate 30 mg oral tablet, extended release: 1 tab(s) orally 2 times a day (30 Jun 2020 16:37)  memantine 5 mg oral tablet: 1 tab(s) orally once a day (10 Sep 2019 15:31)  Metoprolol Succinate ER 50 mg oral tablet, extended release: 1 tab(s) orally once a day (30 Jun 2020 16:38)  Vitamin B-12 500 mcg oral tablet: 1 tab(s) orally once a day (10 Sep 2019 15:31)    MEDICATIONS  (STANDING):  aspirin enteric coated 81 milliGRAM(s) Oral daily  atorvastatin 40 milliGRAM(s) Oral at bedtime  brivaracetam  IVPB 50 milliGRAM(s) IV Intermittent two times a day  cefTRIAXone   IVPB 1000 milliGRAM(s) IV Intermittent every 24 hours  citalopram 10 milliGRAM(s) Oral daily  enoxaparin Injectable 40 milliGRAM(s) SubCutaneous every 24 hours  lacosamide IVPB 75 milliGRAM(s) IV Intermittent <User Schedule>  memantine 5 milliGRAM(s) Oral two times a day  metoprolol succinate ER 50 milliGRAM(s) Oral daily  sodium chloride 0.9%. 1000 milliLiter(s) (85 mL/Hr) IV Continuous <Continuous>  valproate sodium IVPB 250 milliGRAM(s) IV Intermittent three times a day    MEDICATIONS  (PRN):  lacosamide IVPB 100 milliGRAM(s) IV Intermittent once PRN for seizures refractory to Ativan >  3mins  LORazepam   Injectable 1 milliGRAM(s) IV Push once PRN Seizure GTC or focal  > 3 mins    ROS: Pertinent positives in HPI, all other ROS were reviewed and are negative.      Vital Signs Last 24 Hrs  T(C): 36.7 (04 Jul 2020 07:29), Max: 37.3 (03 Jul 2020 12:25)  T(F): 98 (04 Jul 2020 07:29), Max: 99.2 (03 Jul 2020 12:25)  HR: 78 (04 Jul 2020 07:29) (74 - 83)  BP: 130/68 (04 Jul 2020 07:29) (107/67 - 130/68)  BP(mean): --  RR: 18 (04 Jul 2020 07:29) (17 - 18)  SpO2: 98% (04 Jul 2020 07:29) (94% - 99%)    PHYSICAL EXAMINATION:  General: Well-developed, well nourished, in no acute distress.  Eyes: Conjunctiva and sclera clear.  Neck: supple, nontender, good ROM  Lungs: no respiratory distress  Neurologic:  MS: Eyes closed but opens to name easily, alert, , cannot cross midline, Extinction present on L. unable to do multistep commands. Concerns for focal status  	CNs: eyes moving spontaneously in all directions. L Homonymous Hemianopsia. well developed masseter muscles b/l. No facial asymmetry b/l. Symmetric palate elevation in midline. Gag reflex deferred. Tongue midline, normal movements, no atrophy.  	Motor: LUE with subtle cerebellar drift, 4/5, LLE 3/5, down and out  	Sensation: diminished to touch on left  L babinski present    Labs:         Coags  [id number] Chol 115 LDL 53 HDL 47<L> Trig 75  A1C  CardiacMarkers    LFTs  UA  CSF  Immunological Labs    Radiology:  -CT Head  -MRI brain  -MRA brain/Carotids  -EEG  -EKG  -TTE/TONNY Neurology Progress Note    Interval History: Patient with continued 4-6 subclinical seizures per hour.     Home Medications:  Aspirin Enteric Coated 81 mg oral delayed release tablet: 1 tab(s) orally once a day (10 Sep 2019 15:31)  atorvastatin 40 mg oral tablet: 1 tab(s) orally once a day (10 Sep 2019 15:31)  Calcium 600+D: 1 tab(s) orally once a day (10 Sep 2019 15:31)  Centrum oral tablet: 1 tab(s) orally once a day (10 Sep 2019 15:31)  citalopram 10 mg oral tablet: 1 tab(s) orally once a day (10 Sep 2019 15:31)  Co Q-10 100 mg oral capsule: 2 cap(s) orally once a day (10 Sep 2019 15:31)  Fish Oil 1000 mg oral capsule: 2 cap(s) orally once a day (10 Sep 2019 15:31)  isosorbide mononitrate 30 mg oral tablet, extended release: 1 tab(s) orally 2 times a day (30 Jun 2020 16:37)  memantine 5 mg oral tablet: 1 tab(s) orally once a day (10 Sep 2019 15:31)  Metoprolol Succinate ER 50 mg oral tablet, extended release: 1 tab(s) orally once a day (30 Jun 2020 16:38)  Vitamin B-12 500 mcg oral tablet: 1 tab(s) orally once a day (10 Sep 2019 15:31)    MEDICATIONS  (STANDING):  aspirin enteric coated 81 milliGRAM(s) Oral daily  atorvastatin 40 milliGRAM(s) Oral at bedtime  brivaracetam  IVPB 50 milliGRAM(s) IV Intermittent two times a day  cefTRIAXone   IVPB 1000 milliGRAM(s) IV Intermittent every 24 hours  citalopram 10 milliGRAM(s) Oral daily  enoxaparin Injectable 40 milliGRAM(s) SubCutaneous every 24 hours  lacosamide IVPB 75 milliGRAM(s) IV Intermittent <User Schedule>  memantine 5 milliGRAM(s) Oral two times a day  metoprolol succinate ER 50 milliGRAM(s) Oral daily  sodium chloride 0.9%. 1000 milliLiter(s) (85 mL/Hr) IV Continuous <Continuous>  valproate sodium IVPB 250 milliGRAM(s) IV Intermittent three times a day    MEDICATIONS  (PRN):  lacosamide IVPB 100 milliGRAM(s) IV Intermittent once PRN for seizures refractory to Ativan >  3mins  LORazepam   Injectable 1 milliGRAM(s) IV Push once PRN Seizure GTC or focal  > 3 mins    ROS: Pertinent positives in HPI, all other ROS were reviewed and are negative.      Vital Signs Last 24 Hrs  T(C): 36.7 (04 Jul 2020 07:29), Max: 37.3 (03 Jul 2020 12:25)  T(F): 98 (04 Jul 2020 07:29), Max: 99.2 (03 Jul 2020 12:25)  HR: 78 (04 Jul 2020 07:29) (74 - 83)  BP: 130/68 (04 Jul 2020 07:29) (107/67 - 130/68)  BP(mean): --  RR: 18 (04 Jul 2020 07:29) (17 - 18)  SpO2: 98% (04 Jul 2020 07:29) (94% - 99%)    PHYSICAL EXAMINATION:  General: Well-developed, well nourished, in no acute distress.  Eyes: Conjunctiva and sclera clear.  Neck: supple, nontender, good ROM  Lungs: no respiratory distress  Neurologic:  MS: Eyes closed but opens to name easily, alert, , cannot cross midline, Extinction present on L. unable to do multistep commands. Concerns for focal status  	CNs: eyes moving spontaneously in all directions. L Homonymous Hemianopsia. well developed masseter muscles b/l. No facial asymmetry b/l. Symmetric palate elevation in midline. Gag reflex deferred. Tongue midline, normal movements, no atrophy.  	Motor: LUE with subtle cerebellar drift, 4/5, LLE 3/5, down and out  	Sensation: diminished to touch on left  L babinski present      < from: MR Head No Cont (06.30.20 @ 23:27) >  IMPRESSION:    Motion limited exam.    Acute right occipital lobe PCA distribution infarct.    Multiple chronic cerebral infarcts as described above with hemosiderin deposition within the right frontal and right occipital regions.    Chronic right cerebellar infarcts.    < end of copied text >

## 2020-07-04 NOTE — EEG REPORT - NS EEG TEXT BOX
[facility]   COMPREHENSIVE EPILEPSY CENTER   REPORT OF CONTINUOUS VIDEO EEG     [facility]: [address], [place] [address], Ph#: [phone]  [facility]: [id number], [place] [address], Ph#: [phone]  Office: [address] [place] [address], [facility] [address], [place] [address] Ph#: [phone]    Patient Name: SARA FARAH  Age and : 86y (34)  MRN #: [id number]  Location: [place] [address]  Referring Physician: Patricia Plummer    Study Date: 20 - 20 08:00-08:00  Duration: 24 hours    _____________________________________________________________  STUDY INFORMATION    EEG Recording Technique:  The patient underwent continuous Video-EEG monitoring, using Telemetry System hardware on the XLTek Digital System. EEG and video data were stored on a computer hard drive with important events saved in digital archive files. The material was reviewed by a physician (electroencephalographer / epileptologist) on a daily basis. Beni and seizure detection algorithms were utilized and reviewed. An EEG Technician attended to the patient, and was available throughout daytime work hours.  The epilepsy center neurologist was available in person or on call 24-hours per day.    EEG Placement and Labeling of Electrodes:  The EEG was performed utilizing 20 channel referential EEG connections (coronal over temporal over parasagittal montage) using all standard 10-20 electrode placements with EKG, with additional electrodes placed in the inferior temporal region using the modified 10-10 montage electrode placements for elective admissions, or if deemed necessary. Recording was at a sampling rate of 256 samples per second per channel. Time synchronized digital video recording was done simultaneously with EEG recording. A low light infrared camera was used for low light recording.     _____________________________________________________________  HISTORY    Patient is a 86y old  Female who presents with a chief complaint of seizures    PERTINENT MEDICATION:  aspirin enteric coated 81 milliGRAM(s) Oral daily  atorvastatin 40 milliGRAM(s) Oral at bedtime  brivaracetam  IVPB 50 milliGRAM(s) IV Intermittent <User Schedule>  cefTRIAXone   IVPB 1000 milliGRAM(s) IV Intermittent every 24 hours  citalopram 10 milliGRAM(s) Oral daily  enoxaparin Injectable 40 milliGRAM(s) SubCutaneous every 24 hours  lacosamide IVPB 75 milliGRAM(s) IV Intermittent <User Schedule>  memantine 5 milliGRAM(s) Oral two times a day  metoprolol succinate ER 50 milliGRAM(s) Oral daily  sodium chloride 0.9%. 1000 milliLiter(s) (85 mL/Hr) IV Continuous <Continuous>    _____________________________________________________________  INTERPRETATION      Interpretation:    Starting: Day 3      [id number]      Time: 08:00     Duration: 24 hours    Daily EEG Visual Analysis  FINDINGS:  The background was continuous, diffuse delta and theta activity, spontaneously variable and reactive. During wakefulness, the posterior dominant rhythm consisted of a well-modulated 7.5 Hz activity in the left posterior quadrant, with amplitude to 30 uV, that attenuated to eye opening.  Low amplitude frontal beta was noted in wakefulness.    Background Slowing:  -Continuous diffuse theta and polymorphic delta slowing.    Focal Slowing:   -Continuous polymorphic delta and theta slowing in the right hemisphere    Sleep Background:    Other Non-Epileptiform Findings:  None were present.    Interictal Epileptiform Activity:   -Lateralized Periodic Discharges (LPDs) in the right parietal region, broadly contoured, maximum P4, at frequency of 0.5 Hz    Events:  -4-6 subclinical electrographic seizures per hour recorded over the right posterior quadrant, with EEG onset in the right hemisphere with the development of low voltage beta activity or sharply contoured alpha activity in the right hemisphere, maximum in the right posterior quadrant, which evolves into rhythmic delta activity, maximum in the right parasagittal region, maximum P4.  Duration 60-90 seconds    Artifacts:  Intermittent myogenic and movement artifacts were noted.    ECG:  The heart rate on single channel ECG was predominantly between 60-80 BPM.    _____________________________________________________________  EEG SUMMARY/CLASSIFICATION  Abnormal EEG in the awake, drowsy and asleep states.  -4-6 subclinical electrographic seizures per hour recorded over the right posterior quadrant, with EEG onset in the right hemisphere with the development of low voltage beta activity or sharply contoured alpha activity in the right hemisphere, maximum in the right posterior quadrant, which evolves into rhythmic delta activity, maximum in the right parasagittal region, maximum P4.  Duration 60-90 seconds  -Lateralized Periodic Discharges (LPDs) in the right parietal region, broadly contoured, maximum P4, at frequency of 0.5 Hz  -Continuous polymorphic delta and theta slowing in the right hemisphere  -Continuous diffuse theta and polymorphic delta slowing.  _____________________________________________________________  EEG IMPRESSION/CLINICAL CORRELATE  Abnormal EEG study.  1. 4-6 subclinical electrographic seizures per hour recorded over the right posterior quadrant.  2. Potential seizure focus in the right parietal region.  3. Structural or functional abnormality in the right hemisphere.  4. Moderate diffuse nonspecific cerebral dysfunction.    Patricia Plummer MD  Epilepsy Attending Physician

## 2020-07-05 NOTE — PROGRESS NOTE ADULT - ASSESSMENT
82 year old female with PMHx of multiple bi-hemispheric strokes of unknown etiology (w/u includes outpatient ILR which was negative, follows with Dr. Lawrence), s/p R carotid endarterectomy, usually AOx2-3, hx of seizures (multiple GTCs in 2012, weaned off keppra 4 years ago), CAD, multiple bypass tx on ASA 81 who was brought to NS from [place] for concern for seizures. Per family, patient has been feeling "off" for about 1 week, with intermittent visual hallucinations (sees animals and scintillating scotomas), saying things that don't make sense, also having L head turning and staring spells. Per daughter patient was home on day of presentation and had acute onset of visual hallucinations, noted to have unsteady gait and left sided shaking.  She was taken to , where CTH showed chronic infarcts.  She was given Ativan 2mg IVP for left sided shaking, became lethargic and hypotensive but improved rapidly. She was transferred to [facility] for cEEG monitoring. She was in focal status per EEG read arising from R Temporal Lobe. Stroke neurology was consulted for MRI showing acute R occipital lobe PCA infarct.     Impression: Focal Status arising from R Temporal Lobe, acute stroke of unknown etiology, possibly the source of her hallucinations and current focal status. Remains in status with still frequent focal seizures. Will try to improve duration of time between seizures.      Plan:  [x] vEEG: continued subclinical seizures; continue VEEG monitoring  [x] Continue Vimpat to 75mg IV TID  [x] Continue Briviact to 50mg IV BID  [x] Continue VPA 250mg IV TID  [ ] no ativan; if agitated, will give zyprexa  [x] MR head w/o contrast - noted R occipital infarction; stroke neurology consulted with no acute intervention, continue aspirin 81 mg daily   [x] Covid19 negative  [x] + UTI - on cefriaxone, f/u culture and sensitivies 82 year old female with PMHx of multiple bi-hemispheric strokes of unknown etiology (w/u includes outpatient ILR which was negative, follows with Dr. Lawrence), s/p R carotid endarterectomy, usually AOx2-3, hx of seizures (multiple GTCs in 2012, weaned off keppra 4 years ago), CAD, multiple bypass tx on ASA 81 who was brought to NS from [place] for concern for seizures. Per family, patient has been feeling "off" for about 1 week, with intermittent visual hallucinations (sees animals and scintillating scotomas), saying things that don't make sense, also having L head turning and staring spells. Per daughter patient was home on day of presentation and had acute onset of visual hallucinations, noted to have unsteady gait and left sided shaking.  She was taken to , where CTH showed chronic infarcts.  She was given Ativan 2mg IVP for left sided shaking, became lethargic and hypotensive but improved rapidly. She was transferred to [facility] for cEEG monitoring. She was in focal status per EEG read arising from R Temporal Lobe. Stroke neurology was consulted for MRI showing acute R occipital lobe PCA infarct.     Impression: Focal Status arising from R Temporal Lobe, acute stroke of unknown etiology, possibly the source of her hallucinations and current focal status. Remains in status with still frequent focal seizures. Will try to improve duration of time between seizures.      Plan:  [x] vEEG: continued subclinical seizures; continue VEEG monitoring  [x] Continue Vimpat to 75mg IV TID  [x] Discontinue Briviact   [x] load valproic acid 250 bolus over 15 min and increase maintenance to  250-250-500 IV, level in the morning  [ ] no ativan; if agitated, will give zyprexa  [x] MR head w/o contrast - noted R occipital infarction; stroke neurology consulted with no acute intervention, continue aspirin 81 mg daily   [x] Covid19 negative  [x] + UTI - on cefriaxone, f/u culture and sensitivies

## 2020-07-05 NOTE — EEG REPORT - NS EEG TEXT BOX
[facility]   COMPREHENSIVE EPILEPSY CENTER   REPORT OF CONTINUOUS VIDEO EEG     [facility]: [address], [place] [address], Ph#: [phone]  [facility]: [id number], [place] [address], Ph#: [phone]  Office: [address] [place] [address], [facility] [address], [place] [address] Ph#: [phone]    Patient Name: SARA FARAH  Age and : 86y (34)  MRN #: [id number]  Location: [place] [address]  Referring Physician: Particia Plummer    Study Date: 20 - 20 08:00-08:00  Duration: 24 hours    _____________________________________________________________  STUDY INFORMATION    EEG Recording Technique:  The patient underwent continuous Video-EEG monitoring, using Telemetry System hardware on the XLTek Digital System. EEG and video data were stored on a computer hard drive with important events saved in digital archive files. The material was reviewed by a physician (electroencephalographer / epileptologist) on a daily basis. Beni and seizure detection algorithms were utilized and reviewed. An EEG Technician attended to the patient, and was available throughout daytime work hours.  The epilepsy center neurologist was available in person or on call 24-hours per day.    EEG Placement and Labeling of Electrodes:  The EEG was performed utilizing 20 channel referential EEG connections (coronal over temporal over parasagittal montage) using all standard 10-20 electrode placements with EKG, with additional electrodes placed in the inferior temporal region using the modified 10-10 montage electrode placements for elective admissions, or if deemed necessary. Recording was at a sampling rate of 256 samples per second per channel. Time synchronized digital video recording was done simultaneously with EEG recording. A low light infrared camera was used for low light recording.     _____________________________________________________________  HISTORY    Patient is a 86y old  Female who presents with a chief complaint of seizures    PERTINENT MEDICATION:  aspirin enteric coated 81 milliGRAM(s) Oral daily  atorvastatin 40 milliGRAM(s) Oral at bedtime  brivaracetam  IVPB 50 milliGRAM(s) IV Intermittent two times a day  cefTRIAXone   IVPB 1000 milliGRAM(s) IV Intermittent every 24 hours  citalopram 10 milliGRAM(s) Oral daily  enoxaparin Injectable 40 milliGRAM(s) SubCutaneous every 24 hours  lacosamide IVPB 75 milliGRAM(s) IV Intermittent <User Schedule>  memantine 5 milliGRAM(s) Oral two times a day  metoprolol succinate ER 50 milliGRAM(s) Oral daily  sodium chloride 0.9%. 1000 milliLiter(s) (85 mL/Hr) IV Continuous <Continuous>  valproate sodium IVPB 250 milliGRAM(s) IV Intermittent three times a day    _____________________________________________________________  INTERPRETATION      Interpretation:    Starting: Day 3      [id number]      Time: 08:00     Duration: 24 hours    Daily EEG Visual Analysis  FINDINGS:  The background was continuous, diffuse delta and theta activity, spontaneously variable and reactive. During wakefulness, the posterior dominant rhythm consisted of a well-modulated 7.5 Hz activity in the left posterior quadrant, with amplitude to 30 uV, that attenuated to eye opening.  Low amplitude frontal beta was noted in wakefulness.    Background Slowing:  -Continuous diffuse theta and polymorphic delta slowing.    Focal Slowing:   -Continuous polymorphic delta and theta slowing in the right hemisphere    Sleep Background:    Other Non-Epileptiform Findings:  None were present.    Interictal Epileptiform Activity:   -Lateralized Periodic Discharges (LPDs) in the right parietal region, broadly contoured, maximum P4, at frequency of 0.5 Hz    Events:  -4-8 subclinical electrographic seizures per hour recorded over the right posterior quadrant, with EEG onset in the right hemisphere with the development of low voltage beta activity or sharply contoured alpha activity in the right hemisphere, maximum in the right posterior quadrant, which evolves into rhythmic delta activity, maximum in the right parasagittal region, maximum P4.  Duration 50-60 seconds    Artifacts:  Intermittent myogenic and movement artifacts were noted.    ECG:  The heart rate on single channel ECG was predominantly between 60-80 BPM.    _____________________________________________________________  EEG SUMMARY/CLASSIFICATION  Abnormal EEG in the awake, drowsy and asleep states.  -4-8 subclinical electrographic seizures per hour recorded over the right posterior quadrant, with EEG onset in the right hemisphere with the development of low voltage beta activity or sharply contoured alpha activity in the right hemisphere, maximum in the right posterior quadrant, which evolves into rhythmic delta activity, maximum in the right parasagittal region, maximum P4.  Duration 50-60 seconds  -Lateralized Periodic Discharges (LPDs) in the right parietal region, broadly contoured, maximum P4, at frequency of 0.5 Hz  -Continuous polymorphic delta and theta slowing in the right hemisphere  -Continuous diffuse theta and polymorphic delta slowing.  _____________________________________________________________  EEG IMPRESSION/CLINICAL CORRELATE  Abnormal EEG study.  1. 4-8 subclinical electrographic seizures per hour recorded over the right posterior quadrant.  2. Potential seizure focus in the right parietal region.  3. Structural or functional abnormality in the right hemisphere.  4. Moderate diffuse nonspecific cerebral dysfunction.    Present recording is mildly improved compared  with the previous day due to  seizure duration but continue to have a high seizure burden    Patricia Plummer MD  Epilepsy Attending Physician

## 2020-07-05 NOTE — PROGRESS NOTE ADULT - SUBJECTIVE AND OBJECTIVE BOX
Neurology Progress Note    Interval History: No acute events reported overnight.     MEDICATIONS  (STANDING):  aspirin enteric coated 81 milliGRAM(s) Oral daily  atorvastatin 40 milliGRAM(s) Oral at bedtime  brivaracetam  IVPB 50 milliGRAM(s) IV Intermittent two times a day  cefTRIAXone   IVPB 1000 milliGRAM(s) IV Intermittent every 24 hours  citalopram 10 milliGRAM(s) Oral daily  enoxaparin Injectable 40 milliGRAM(s) SubCutaneous every 24 hours  lacosamide IVPB 75 milliGRAM(s) IV Intermittent <User Schedule>  memantine 5 milliGRAM(s) Oral two times a day  metoprolol succinate ER 50 milliGRAM(s) Oral daily  sodium chloride 0.9%. 1000 milliLiter(s) (85 mL/Hr) IV Continuous <Continuous>  valproate sodium IVPB 250 milliGRAM(s) IV Intermittent three times a day    MEDICATIONS  (PRN):  lacosamide IVPB 100 milliGRAM(s) IV Intermittent once PRN for seizures refractory to Ativan >  3mins  LORazepam   Injectable 1 milliGRAM(s) IV Push once PRN Seizure GTC or focal  > 3 mins    Vital Signs Last 24 Hrs  T(C): 36.6 (05 Jul 2020 08:14), Max: 37.1 (04 Jul 2020 16:43)  T(F): 97.9 (05 Jul 2020 08:14), Max: 98.7 (04 Jul 2020 16:43)  HR: 79 (05 Jul 2020 08:14) (73 - 83)  BP: 111/63 (05 Jul 2020 08:14) (104/60 - 132/77)  BP(mean): --  RR: 18 (05 Jul 2020 08:14) (16 - 18)  SpO2: 96% (05 Jul 2020 08:14) (96% - 98%)      Neurologic:  MS: Eyes closed but opens to name easily, alert, , cannot cross midline, Extinction present on L. unable to do multistep commands. Concerns for focal status  	CNs: eyes moving spontaneously in all directions. L Homonymous Hemianopsia. well developed masseter muscles b/l. No facial asymmetry b/l. Symmetric palate elevation in midline. Gag reflex deferred. Tongue midline, normal movements, no atrophy.  	Motor: LUE with subtle cerebellar drift, 4/5, LLE 3/5, down and out  	Sensation: diminished to touch on left  	L babinski present      < from: MR Head No Cont (06.30.20 @ 23:27) >  IMPRESSION:    Motion limited exam.    Acute right occipital lobe PCA distribution infarct.    Multiple chronic cerebral infarcts as described above with hemosiderin deposition within the right frontal and right occipital regions.    Chronic right cerebellar infarcts.    < end of copied text > Neurology Progress Note    Interval History: continue to have seizures with minimal clinical correlate    MEDICATIONS  (STANDING):  aspirin enteric coated 81 milliGRAM(s) Oral daily  atorvastatin 40 milliGRAM(s) Oral at bedtime  brivaracetam  IVPB 50 milliGRAM(s) IV Intermittent two times a day  cefTRIAXone   IVPB 1000 milliGRAM(s) IV Intermittent every 24 hours  citalopram 10 milliGRAM(s) Oral daily  enoxaparin Injectable 40 milliGRAM(s) SubCutaneous every 24 hours  lacosamide IVPB 75 milliGRAM(s) IV Intermittent <User Schedule>  memantine 5 milliGRAM(s) Oral two times a day  metoprolol succinate ER 50 milliGRAM(s) Oral daily  sodium chloride 0.9%. 1000 milliLiter(s) (85 mL/Hr) IV Continuous <Continuous>  valproate sodium IVPB 250 milliGRAM(s) IV Intermittent three times a day    MEDICATIONS  (PRN):  lacosamide IVPB 100 milliGRAM(s) IV Intermittent once PRN for seizures refractory to Ativan >  3mins  LORazepam   Injectable 1 milliGRAM(s) IV Push once PRN Seizure GTC or focal  > 3 mins    Vital Signs Last 24 Hrs  T(C): 36.6 (05 Jul 2020 08:14), Max: 37.1 (04 Jul 2020 16:43)  T(F): 97.9 (05 Jul 2020 08:14), Max: 98.7 (04 Jul 2020 16:43)  HR: 79 (05 Jul 2020 08:14) (73 - 83)  BP: 111/63 (05 Jul 2020 08:14) (104/60 - 132/77)  BP(mean): --  RR: 18 (05 Jul 2020 08:14) (16 - 18)  SpO2: 96% (05 Jul 2020 08:14) (96% - 98%)      Neurologic:  MS: Eyes closed but opens to name easily, alert, , cannot cross midline, Extinction present on L. unable to do multistep commands. Concerns for focal status  	CNs: eyes moving spontaneously in all directions. L Homonymous Hemianopsia. well developed masseter muscles b/l. No facial asymmetry b/l. Symmetric palate elevation in midline. Gag reflex deferred. Tongue midline, normal movements, no atrophy.  	Motor: LUE with subtle cerebellar drift, 4/5, LLE 3/5, down and out  	Sensation: diminished to touch on left  	L babinski present      < from: MR Head No Cont (06.30.20 @ 23:27) >  IMPRESSION:    Motion limited exam.    Acute right occipital lobe PCA distribution infarct.    Multiple chronic cerebral infarcts as described above with hemosiderin deposition within the right frontal and right occipital regions.    Chronic right cerebellar infarcts.    < end of copied text >

## 2020-07-05 NOTE — PROGRESS NOTE ADULT - ATTENDING COMMENTS
continue to be in focal status but slight decreased duration and seizure frequency    Plan: load valproic acid 500 bolus over 15 min and increase maintenance to 500 TID IV, level in the morning  decrease briviact to 25 mg IV BID for 2 doses and stop  continue Vimpat 75 mg TID IV continue to be in focal status but slight decreased duration and seizure frequency    Plan: load valproic acid 250 bolus over 15 min and increase maintenance to  250-250-500 IV, level in the morning  dc briviact   continue Vimpat 75 mg TID IV

## 2020-07-06 NOTE — PROGRESS NOTE ADULT - ASSESSMENT
82 year old female with PMHx of multiple bi-hemispheric strokes of unknown etiology (w/u includes outpatient ILR which was negative, follows with Dr. Lawrence), s/p R carotid endarterectomy, usually AOx2-3, hx of seizures (multiple GTCs in 2012, weaned off keppra 4 years ago), CAD, multiple bypass tx on ASA 81 who was brought to NS from [place] for concern for seizures. Per family, patient has been feeling "off" for about 1 week, with intermittent visual hallucinations (sees animals and scintillating scotomas), saying things that don't make sense, also having L head turning and staring spells. Per daughter patient was home on day of presentation and had acute onset of visual hallucinations, noted to have unsteady gait and left sided shaking.  She was taken to , where CTH showed chronic infarcts.  She was given Ativan 2mg IVP for left sided shaking, became lethargic and hypotensive but improved rapidly. She was transferred to [facility] for cEEG monitoring. She was in focal status per EEG read arising from R Temporal Lobe. Stroke neurology was consulted for MRI showing acute R occipital lobe PCA infarct.     Impression: Focal Status arising from R Temporal Lobe, acute stroke of unknown etiology, possibly the source of her hallucinations and current focal status. Improved mentation and EEG today      Plan:  [x] continue VEEG monitoring  [x] Continue Vimpat to 75mg IV TID  [x] c/w -250-500 IV, level in the morning  [ ] no ativan; if agitated, will give zyprexa  [x] MR head w/o contrast - noted R occipital infarction; stroke neurology consulted with no acute intervention, continue aspirin 81 mg daily   [x] Covid19 negative  [x] + UTI - on cefriaxone, f/u culture and sensitivies    d/c planning

## 2020-07-06 NOTE — DIETITIAN INITIAL EVALUATION ADULT. - REASON INDICATOR FOR ASSESSMENT
Pt seen for LOS.   Source: pt (confused, aware of age and date of birth), PCA, daughter (emergency [phone])    Pt transferred from OSH for concern for seizure, pt being monitored for seizure, noted pt c acute right occipital lobe PCA infarct, left sided hemiparesis. Pt seen by bedside swallow evaluation and recommended for pureed diet.

## 2020-07-06 NOTE — CHART NOTE - NSCHARTNOTEFT_GEN_A_CORE
Discussed with Dr. Thomas who states no urgent need for re-consult at this time. This service will remain available as needed.

## 2020-07-06 NOTE — DIETITIAN INITIAL EVALUATION ADULT. - PHYSICAL APPEARANCE
well nourished/other (specify)/elderly Skin: no pressure injuries  Edema: none at this time     ht: 67", wt: 130 pounds, BMI: 20.3 kg/m2, IBW: 135 pounds +/- 10%

## 2020-07-06 NOTE — DIETITIAN INITIAL EVALUATION ADULT. - PERTINENT MEDS FT
MEDICATIONS  (STANDING):  aspirin enteric coated 81 milliGRAM(s) Oral daily  atorvastatin 40 milliGRAM(s) Oral at bedtime  cefTRIAXone   IVPB 1000 milliGRAM(s) IV Intermittent every 24 hours  citalopram 10 milliGRAM(s) Oral daily  enoxaparin Injectable 40 milliGRAM(s) SubCutaneous every 24 hours  lacosamide IVPB 75 milliGRAM(s) IV Intermittent <User Schedule>  memantine 5 milliGRAM(s) Oral two times a day  metoprolol succinate ER 50 milliGRAM(s) Oral daily  sodium chloride 0.9%. 1000 milliLiter(s) (85 mL/Hr) IV Continuous <Continuous>  valproate sodium IVPB 250 milliGRAM(s) IV Intermittent <User Schedule>  valproate sodium IVPB 500 milliGRAM(s) IV Intermittent <User Schedule>    MEDICATIONS  (PRN):  lacosamide IVPB 100 milliGRAM(s) IV Intermittent once PRN for seizures refractory to Ativan >  3mins  LORazepam   Injectable 1 milliGRAM(s) IV Push once PRN Seizure GTC or focal  > 3 mins

## 2020-07-06 NOTE — DIETITIAN INITIAL EVALUATION ADULT. - OTHER INFO
Pt reports she likes to eat various foods but currently is not hungry. As per daughter, pt typically consumes small portions about 4-6 times per day. Reports pt was eating a variety of foods at home, lives c spouse, daughter and daughter's family, try to maintain a plant based diet, pt does consume lean sources of animal protein and does like the occasional take out as well.     Daughter reports pt c NKFA. Per chart, pt was on fish oil, folic acid, multivitamin, calcium, vitamin B12 and co-enzyme Q10 at home.     Daughter unsure of pt's usual wt but reports wt has been stable and pt c no recent wt changes. Pt states she thinks she weighs 120 pounds, as per dosing wt, pt weighs about 130 pounds, would continue to monitor wt in house.    Daughter has been trying to bring in calorically dense foods such as cream soup for pt, agreeable to trial of Magic Cup, fortified hot cereal, and Ensure supplement to help improve overall intake.

## 2020-07-06 NOTE — DIETITIAN INITIAL EVALUATION ADULT. - FACTORS AFF FOOD INTAKE
as per PCA, pt did better c eating yesterday compared to this morning; has been taking mostly spoons of breakfast this morning, yesterday was able to consume all of her hot cereal for breakfast and had some soup and pumpkin pudding filling from home; noted last BM 7/1

## 2020-07-06 NOTE — DIETITIAN INITIAL EVALUATION ADULT. - ADD RECOMMEND
1. Recommend Ensure Enlive x 2 daily (350kcal, 20g protein per 8 ounces). 2. Will provide trial of Magic Cup (calorie/protein dense dysphagia frozen dessert). 3. Discussed c daughter to encourage Prot rich foods, discussed Prot sources available on menu. 4. Continue to provide assistance and encouragement c all meals and snacks.

## 2020-07-06 NOTE — PROGRESS NOTE ADULT - SUBJECTIVE AND OBJECTIVE BOX
NEUROLOGY PROGRESS NOTE    Interval History: improving mentation, on tx for UTI    MEDICATIONS  (STANDING):  aspirin enteric coated 81 milliGRAM(s) Oral daily  atorvastatin 40 milliGRAM(s) Oral at bedtime  cefTRIAXone   IVPB 1000 milliGRAM(s) IV Intermittent every 24 hours  citalopram 10 milliGRAM(s) Oral daily  enoxaparin Injectable 40 milliGRAM(s) SubCutaneous every 24 hours  lacosamide IVPB 75 milliGRAM(s) IV Intermittent <User Schedule>  memantine 5 milliGRAM(s) Oral two times a day  metoprolol succinate ER 50 milliGRAM(s) Oral daily  sodium chloride 0.9%. 1000 milliLiter(s) (85 mL/Hr) IV Continuous <Continuous>  valproate sodium IVPB 250 milliGRAM(s) IV Intermittent <User Schedule>  valproate sodium IVPB 500 milliGRAM(s) IV Intermittent <User Schedule>    MEDICATIONS  (PRN):  lacosamide IVPB 100 milliGRAM(s) IV Intermittent once PRN for seizures refractory to Ativan >  3mins  LORazepam   Injectable 1 milliGRAM(s) IV Push once PRN Seizure GTC or focal  > 3 mins      Vital Signs Last 24 Hrs  T(C): 36.6 (05 Jul 2020 08:14), Max: 37.1 (04 Jul 2020 16:43)  T(F): 97.9 (05 Jul 2020 08:14), Max: 98.7 (04 Jul 2020 16:43)  HR: 79 (05 Jul 2020 08:14) (73 - 83)  BP: 111/63 (05 Jul 2020 08:14) (104/60 - 132/77)  BP(mean): --  RR: 18 (05 Jul 2020 08:14) (16 - 18)  SpO2: 96% (05 Jul 2020 08:14) (96% - 98%)      Neurologic:  MS: awake, alert, , cannot cross midline, oriented x2 Extinction present on L.   	CNs: eyes moving spontaneously in all directions. L Homonymous Hemianopsia. No facial asymmetry b/l. Symmetric palate elevation in midline. Gag reflex deferred. Tongue midline,   	Motor: LUE with subtle cerebellar drift, 4/5, LLE 3/5, down and out  	Sensation: diminished to touch on left  	L babinski present      < from: MR Head No Cont (06.30.20 @ 23:27) >  IMPRESSION:    Motion limited exam.    Acute right occipital lobe PCA distribution infarct.    Multiple chronic cerebral infarcts as described above with hemosiderin deposition within the right frontal and right occipital regions.    Chronic right cerebellar infarcts.    < end of copied text >

## 2020-07-06 NOTE — EEG REPORT - NS EEG TEXT BOX
[facility]   COMPREHENSIVE EPILEPSY CENTER   REPORT OF CONTINUOUS VIDEO EEG     [facility]: [address] [place] [address] , [address], [place] [address], Ph#: [phone]  [facility]: [address] [address], Ph#: [phone]  Office: [address] [place] [address], [place] [address] Ph#: [phone]    Patient Name: SARA FARAH  Age and : 86y (34)  MRN #: [id number]  Location: [place] [address]  Referring Physician: Patricia Plummer    Study Date: 20  8 am to 20 08:00-08:00  Duration: 24 hours  _____________________________________________________________  HISTORY    Patient is a 86y old  Female who presents with a chief complaint of seizures      MEDICATIONS  (STANDING):  aspirin enteric coated 81 milliGRAM(s) Oral daily  atorvastatin 40 milliGRAM(s) Oral at bedtime  cefTRIAXone   IVPB 1000 milliGRAM(s) IV Intermittent every 24 hours  citalopram 10 milliGRAM(s) Oral daily  enoxaparin Injectable 40 milliGRAM(s) SubCutaneous every 24 hours  lacosamide IVPB 75 milliGRAM(s) IV Intermittent <User Schedule>  memantine 5 milliGRAM(s) Oral two times a day  metoprolol succinate ER 50 milliGRAM(s) Oral daily  sodium chloride 0.9%. 1000 milliLiter(s) (85 mL/Hr) IV Continuous <Continuous>  valproate sodium IVPB 250 milliGRAM(s) IV Intermittent <User Schedule>  valproate sodium IVPB 500 milliGRAM(s) IV Intermittent <User Schedule>        _____________________________________________________________  INTERPRETATION      Interpretation:    Starting: Day 3      [id number]      Time: 08:00     Duration: 24 hours    Daily EEG Visual Analysis  FINDINGS:  The background was continuous, diffuse delta and theta activity, spontaneously variable and reactive. During wakefulness, the posterior dominant rhythm consisted of a well-modulated 7.5 Hz activity in the left posterior quadrant, with amplitude to 30 uV, that attenuated to eye opening.  Low amplitude frontal beta was noted in wakefulness.    Background Slowing:  -Continuous diffuse theta and polymorphic delta slowing.    Focal Slowing:   -Continuous polymorphic delta and theta slowing in the right hemisphere    Sleep Background: no stage 2 sleep transients noted     Other Non-Epileptiform Findings:  None were present.    Interictal Epileptiform Activity:   -Lateralized Periodic Discharges (LPDs) in the right parietal region, broadly contoured, maximum P4, at frequency of 0.5 Hz    Events:  -4-8 subclinical electrographic seizures per hour recorded over the right posterior quadrant until 10 pm last night. EEG onset in the right hemisphere with the development of low voltage beta activity or sharply contoured alpha activity in the right hemisphere, maximum in the right posterior quadrant, which evolves into rhythmic delta activity, maximum in the right parasagittal region, maximum P4.  Duration 50-60 seconds    Artifacts:  Intermittent myogenic and movement artifacts were noted.    ECG:  The heart rate on single channel ECG was predominantly between 60-80 BPM.    _____________________________________________________________  EEG SUMMARY/CLASSIFICATION  Abnormal EEG in the awake, drowsy and asleep states.  - multiple subclinical electrographic seizures per hour (upto 10 per hour until 10 pm - no seizure recorded from 10 pm to 8 am)  recorded over the right posterior quadrant, with EEG onset in the right hemisphere with the development of low voltage beta activity or sharply contoured alpha activity in the right hemisphere, maximum in the right posterior quadrant, which evolves into rhythmic delta activity, maximum in the right parasagittal region, maximum P4.  Duration 50-60 seconds  -Lateralized Periodic Discharges (LPDs) in the right parietal region, broadly contoured, maximum P4, at frequency of 0.5 Hz  -Continuous polymorphic delta and theta slowing in the right hemisphere  -Continuous diffuse theta and polymorphic delta slowing.  _____________________________________________________________  EEG IMPRESSION/CLINICAL CORRELATE  Abnormal EEG study.  1. Multiple subclinical electrographic seizures  recorded over the right posterior quadrant.  2. Potential seizure focus in the right parietal region.  3. Structural or functional abnormality in the right hemisphere.  4. Moderate diffuse nonspecific cerebral dysfunction.    Present recording is improved compared to yesterday.     Prelim read   Nadir Lemons MD  Epilepsy Fellow Start: 07-05-20  08:00  End: 07-06-20 08:00  Duration: 24 hours    Interpretation:    Starting: Day 3      [id number]      Time: 08:00     Duration: 24 hours    Daily EEG Visual Analysis  FINDINGS:  The background was continuous, diffuse delta and theta activity, spontaneously variable and reactive. During wakefulness, the posterior dominant rhythm consisted of a well-modulated 7.5 Hz activity in the left posterior quadrant, with amplitude to 30 uV, that attenuated to eye opening.  Low amplitude frontal beta was noted in wakefulness.    Background Slowing:  -diffuse theta and delta slowing.    Focal Slowing:   -Continuous polymorphic delta and theta slowing in the right hemisphere    Sleep Background: no stage 2 sleep transients noted     Other Non-Epileptiform Findings:  None were present.    Interictal Epileptiform Activity:   -Lateralized Periodic Discharges (LPDs) in the right parietal region, broadly contoured, maximum P4, at frequency of 0.5 Hz    Events:  -4-13 subclinical electrographic seizures per hour recorded over the right posterior quadrant until 10 pm last night. EEG onset in the right hemisphere with the development of low voltage beta activity or sharply contoured alpha activity in the right hemisphere, maximum in the right posterior quadrant, which evolves into rhythmic delta activity, maximum in the right parasagittal region, maximum P4.  Duration 50-60 seconds    Artifacts:  Intermittent myogenic and movement artifacts were noted.    ECG:  The heart rate on single channel ECG was predominantly between 60-80 BPM.    _____________________________________________________________  EEG SUMMARY/CLASSIFICATION  Abnormal EEG in the awake, drowsy and asleep states.  1. multiple subclinical electrographic seizures, focal, right hemipshere  2. lateralized Periodic Discharges (LPDs), focal, right parietal region  3. continuous polymorphic delta and theta slowing, focal, right hemisphere  4. background slowing, generalized.   _____________________________________________________________  EEG IMPRESSION/CLINICAL CORRELATE  This is an abnormal EEG study due to the presence of focal status epilepticus over the right posterior quadrant. No further seizures seen after 7/6 at 10p.  There is also evidence of a structural abnormality in the right hemisphere and of moderate diffuse nonspecific cerebral dysfunction.    Present recording is improved compared to yesterday.     Nadir Lemons MD  Epilepsy Fellow     Dominic Owens MD  Epilepsy Attending

## 2020-07-07 NOTE — PROGRESS NOTE ADULT - ASSESSMENT
82 year old female with PMHx of multiple bi-hemispheric strokes of unknown etiology (w/u includes outpatient ILR which was negative, follows with Dr. Lawrence), s/p R carotid endarterectomy, usually AOx2-3, hx of seizures (multiple GTCs in 2012, weaned off keppra 4 years ago), CAD, multiple bypass tx on ASA 81 who was brought to [facility] from [place] for concern for seizures. Per family, patient has been feeling "off" for about 1 week, with intermittent visual hallucinations (sees animals and scintillating scotomas), saying things that don't make sense, also having L head turning and staring spells. Per daughter patient was home on day of presentation and had acute onset of visual hallucinations, noted to have unsteady gait and left sided shaking.  She was taken to , where CTH showed chronic infarcts.  She was given Ativan 2mg IVP for left sided shaking, became lethargic and hypotensive but improved rapidly. She was transferred to [facility] for cEEG monitoring. She was in focal status per EEG read arising from R Temporal Lobe. Stroke neurology was consulted for MRI showing acute R occipital lobe PCA infarct.     Impression: Focal Status arising from R Temporal Lobe, acute stroke of unknown etiology, possibly the source of her hallucinations and current focal status. Improved mentation and EEG today    Plan:  [x] continue VEEG monitoring  [x] Continue Vimpat to 75mg IV TID  [x] c/w -250-500 IV, level in the morning  [ ] no ativan; if agitated, will give zyprexa  [x] MR head w/o contrast - noted R occipital infarction; stroke neurology consulted with no acute intervention, continue aspirin 81 mg daily   [x] Covid19 negative  [x] + UTI - on cefriaxone, f/u culture and sensitivies    d/c planning

## 2020-07-07 NOTE — CHART NOTE - NSCHARTNOTEFT_GEN_A_CORE
spoke with  at bedside and daughter Indira on phone.    Updated on pts progress and improved status.  Plan for likely d/c to [facility] TBI 7/8 if all goes well with switching to PO AEDS.    Questions and concerns addressed, they voiced understanding.

## 2020-07-07 NOTE — PROGRESS NOTE ADULT - SUBJECTIVE AND OBJECTIVE BOX
NEUROLOGY PROGRESS NOTE    Interval History: improving mentation, on tx for UTI    MEDICATIONS  (STANDING):  aspirin enteric coated 81 milliGRAM(s) Oral daily  atorvastatin 40 milliGRAM(s) Oral at bedtime  cefTRIAXone   IVPB 1000 milliGRAM(s) IV Intermittent every 24 hours  citalopram 10 milliGRAM(s) Oral daily  enoxaparin Injectable 40 milliGRAM(s) SubCutaneous every 24 hours  lacosamide IVPB 75 milliGRAM(s) IV Intermittent <User Schedule>  memantine 5 milliGRAM(s) Oral two times a day  metoprolol succinate ER 50 milliGRAM(s) Oral daily  sodium chloride 0.9%. 1000 milliLiter(s) (85 mL/Hr) IV Continuous <Continuous>  valproate sodium IVPB 250 milliGRAM(s) IV Intermittent <User Schedule>  valproate sodium IVPB 500 milliGRAM(s) IV Intermittent <User Schedule>    MEDICATIONS  (PRN):  lacosamide IVPB 100 milliGRAM(s) IV Intermittent once PRN for seizures refractory to Ativan >  3mins  LORazepam   Injectable 1 milliGRAM(s) IV Push once PRN Seizure GTC or focal  > 3 mins    Vital Signs Last 24 Hrs  T(C): 36.6 (07 Jul 2020 04:30), Max: 36.7 (06 Jul 2020 15:37)  T(F): 97.9 (07 Jul 2020 04:30), Max: 98 (06 Jul 2020 15:37)  HR: 89 (07 Jul 2020 04:30) (74 - 98)  BP: 132/83 (07 Jul 2020 04:30) (117/72 - 166/76)  BP(mean): --  RR: 18 (07 Jul 2020 04:30) (18 - 18)  SpO2: 98% (07 Jul 2020 04:30) (97% - 99%)    Neurologic:  MS: awake, alert, , cannot cross midline, oriented x2 Extinction present on L.   	CNs: eyes moving spontaneously in all directions. L Homonymous Hemianopsia. No facial asymmetry b/l. Symmetric palate elevation in midline. Gag reflex deferred. Tongue midline,   	Motor: LUE with subtle cerebellar drift, 4/5, LLE 3/5, down and out  	Sensation: diminished to touch on left  	L babinski present      < from: MR Head No Cont (06.30.20 @ 23:27) >  IMPRESSION:    Motion limited exam.    Acute right occipital lobe PCA distribution infarct.    Multiple chronic cerebral infarcts as described above with hemosiderin deposition within the right frontal and right occipital regions.    Chronic right cerebellar infarcts.    < end of copied text >    EEG SUMMARY/CLASSIFICATION  Abnormal EEG in the awake, drowsy and asleep states.  1. multiple subclinical electrographic seizures, focal, right hemipshere  2. lateralized Periodic Discharges (LPDs), focal, right parietal region  3. continuous polymorphic delta and theta slowing, focal, right hemisphere  4. background slowing, generalized.   _____________________________________________________________  EEG IMPRESSION/CLINICAL CORRELATE  This is an abnormal EEG study due to the presence of focal status epilepticus over the right posterior quadrant. No further seizures seen after 7/6 at 10p.  There is also evidence of a structural abnormality in the right hemisphere and of moderate diffuse nonspecific cerebral dysfunction. NEUROLOGY PROGRESS NOTE    Interval History: no acute events reported    MEDICATIONS  (STANDING):  aspirin enteric coated 81 milliGRAM(s) Oral daily  atorvastatin 40 milliGRAM(s) Oral at bedtime  cefTRIAXone   IVPB 1000 milliGRAM(s) IV Intermittent every 24 hours  citalopram 10 milliGRAM(s) Oral daily  enoxaparin Injectable 40 milliGRAM(s) SubCutaneous every 24 hours  lacosamide IVPB 75 milliGRAM(s) IV Intermittent <User Schedule>  memantine 5 milliGRAM(s) Oral two times a day  metoprolol succinate ER 50 milliGRAM(s) Oral daily  sodium chloride 0.9%. 1000 milliLiter(s) (85 mL/Hr) IV Continuous <Continuous>  valproate sodium IVPB 250 milliGRAM(s) IV Intermittent <User Schedule>  valproate sodium IVPB 500 milliGRAM(s) IV Intermittent <User Schedule>    MEDICATIONS  (PRN):  lacosamide IVPB 100 milliGRAM(s) IV Intermittent once PRN for seizures refractory to Ativan >  3mins  LORazepam   Injectable 1 milliGRAM(s) IV Push once PRN Seizure GTC or focal  > 3 mins    Vital Signs Last 24 Hrs  T(C): 36.6 (07 Jul 2020 04:30), Max: 36.7 (06 Jul 2020 15:37)  T(F): 97.9 (07 Jul 2020 04:30), Max: 98 (06 Jul 2020 15:37)  HR: 89 (07 Jul 2020 04:30) (74 - 98)  BP: 132/83 (07 Jul 2020 04:30) (117/72 - 166/76)  BP(mean): --  RR: 18 (07 Jul 2020 04:30) (18 - 18)  SpO2: 98% (07 Jul 2020 04:30) (97% - 99%)    Neurologic:  MS: awake, alert, , cannot cross midline, oriented x2 Extinction present on L.   	CNs: eyes moving spontaneously in all directions. L Homonymous Hemianopsia. No facial asymmetry b/l. Symmetric palate elevation in midline. Gag reflex deferred. Tongue midline,   	Motor: LUE with subtle cerebellar drift, 4/5, LLE 3/5, down and out  	Sensation: diminished to touch on left  	L babinski present      < from: MR Head No Cont (06.30.20 @ 23:27) >  IMPRESSION:    Motion limited exam.    Acute right occipital lobe PCA distribution infarct.    Multiple chronic cerebral infarcts as described above with hemosiderin deposition within the right frontal and right occipital regions.    Chronic right cerebellar infarcts.    < end of copied text >    EEG SUMMARY/CLASSIFICATION  Abnormal EEG in the awake, drowsy and asleep states.  1. multiple subclinical electrographic seizures, focal, right hemipshere  2. lateralized Periodic Discharges (LPDs), focal, right parietal region  3. continuous polymorphic delta and theta slowing, focal, right hemisphere  4. background slowing, generalized.   _____________________________________________________________  EEG IMPRESSION/CLINICAL CORRELATE  This is an abnormal EEG study due to the presence of focal status epilepticus over the right posterior quadrant. No further seizures seen after 7/6 at 10p.  There is also evidence of a structural abnormality in the right hemisphere and of moderate diffuse nonspecific cerebral dysfunction.

## 2020-07-07 NOTE — CONSULT NOTE ADULT - SUBJECTIVE AND OBJECTIVE BOX
Patient is a 86y old  Female who presents with a chief complaint of     Admission HPI:  81 yo woman with history of multiple strokes of unknown etiology (w/u inc ILR negative follows with Dr. Lawrence), carotid endarterectomy, mild-mod cognitive dysfx but can be AAOx2-3, hx of seizures (multiple GTCs in 2012, was on keppra but taken off all AEDs for past 4 years), CAD, multiple bypass tx on ASA 81 from [place] for concern for seizures. As per family, patient has been feeling "off" for about 1 week, with intermittent visual hallucinations (sees animals and scintillating scotomas), saying things that don't make sense, also having L head turning and staring spells. Per daughter patient was home today had acute onset of visual hallucinations noted to have unsteady gait and left sided focal shaking. Per daughter, prior to 1 week pt was independently ambulating and doing "yoga routines." Taken to [place], where CTH with chronic infarcts. Given Ativan 2mg IVP for left sided shaking became lethargic and hypotensive but improved rapidly. Tx to [facility] for cEEG monitoring. Seen in ED in [place], alert, awake, L hemiparesis. (30 Jun 2020 14:15)    Interval History:  MRI noted Acute right occipital lobe PCA distribution infarct. Multiple chronic cerebral infarcts as described above with hemosiderin deposition within the right frontal and right occipital regions.  Chronic right cerebellar infarcts.  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 No further seizures seen after 7/6 at 10p.  There is also evidence of a structural abnormality in the right hemisphere and of moderate diffuse nonspecific cerebral dysfunction.  Course also complicated by UTI.    REVIEW OF SYSTEMS: Patient with confusion- denying all ROS: No chest pain, shortness of breath, nausea, vomiting or diarhea; other ROS neg     PAST MEDICAL & SURGICAL HISTORY  Dementia  Breast cancer  Peripheral vision loss  CAD (coronary artery disease)  HLD (hyperlipidemia)  Seizure  TIA (transient ischemic attack)  H/O lumpectomy  S/P tonsillectomy  S/P lumpectomy, right breast  S/P carotid endarterectomy  S/P CABG x 5    FUNCTIONAL HISTORY:   Lives with  and grandchildren.  PTA Independent    CURRENT FUNCTIONAL STATUS:  Mod A transfers, Max A gait.    FAMILY HISTORY   Family history of heart disease (Sibling)  Family history of ovarian cancer  Family history of heart attack    RECENT LABS/IMAGING  CBC Full  -  ( 06 Jul 2020 06:50 )  WBC Count : 5.39 K/uL  RBC Count : 3.77 M/uL  Hemoglobin : 11.9 g/dL  Hematocrit : 35.7 %  Platelet Count - Automated : 134 K/uL  Mean Cell Volume : 94.7 fl  Mean Cell Hemoglobin : 31.6 pg  Mean Cell Hemoglobin Concentration : 33.3 gm/dL  Auto Neutrophil # : x  Auto Lymphocyte # : x  Auto Monocyte # : x  Auto Eosinophil # : x  Auto Basophil # : x  Auto Neutrophil % : x  Auto Lymphocyte % : x  Auto Monocyte % : x  Auto Eosinophil % : x  Auto Basophil % : x    07-06    139  |  102  |  15  ----------------------------<  93  3.9   |  29  |  0.66    Ca    9.2      06 Jul 2020 06:50    VITALS  T(C): 36.5 (07-07-20 @ 07:40), Max: 36.7 (07-06-20 @ 15:37)  HR: 83 (07-07-20 @ 07:40) (74 - 98)  BP: 130/85 (07-07-20 @ 07:40) (117/72 - 166/76)  RR: 18 (07-07-20 @ 07:40) (18 - 18)  SpO2: 96% (07-07-20 @ 07:40) (96% - 99%)  Wt(kg): --    ALLERGIES  lidocaine (Other)    MEDICATIONS   aspirin enteric coated 81 milliGRAM(s) Oral daily  atorvastatin 40 milliGRAM(s) Oral at bedtime  cefTRIAXone   IVPB 1000 milliGRAM(s) IV Intermittent every 24 hours  citalopram 10 milliGRAM(s) Oral daily  diVALproex  milliGRAM(s) Oral <User Schedule>  enoxaparin Injectable 40 milliGRAM(s) SubCutaneous every 24 hours  lacosamide 100 milliGRAM(s) Oral <User Schedule>  lacosamide 50 milliGRAM(s) Oral <User Schedule>  lacosamide IVPB 100 milliGRAM(s) IV Intermittent once PRN  LORazepam   Injectable 1 milliGRAM(s) IV Push once PRN  memantine 5 milliGRAM(s) Oral two times a day  metoprolol succinate ER 50 milliGRAM(s) Oral daily  sodium chloride 0.9%. 1000 milliLiter(s) IV Continuous <Continuous>    PHYSICAL EXAM  Constitutional - NAD, Comfortable, getting EEG done  HEENT - NCAT, EOMI  Neck - Supple, No limited ROM  Chest - CTA bilaterally, No wheeze, No rhonchi, No crackles  Cardiovascular - RRR, S1S2, No murmurs  Abdomen - BS+, Soft, NTND  Extremities - No C/C/E, No calf tenderness   Neurologic Exam -                 Alert  Follows verbal instruction  Motor non-focal without deficits.  AAO x 2  Poor insight- thinks we are in [place] and uncertain how long in the hospital- understands "had a seizure"  Speech hypophonic but appropriate.      Psychiatric - Mood stable, Affect WNL      Impression:  81 yo with functional deficits secondary to diagnosis of CVA and Seizures    Plan:  PT- ROM, Bed Mob, Transfers, Amb w AD   OT- ADLs, cognition  SLP- Dysphagia eval and treat  Prec- Falls, Cardiac, seizure  DVT Prophylaxis- Lovenox  Skin- Turn q2 h  Dispo- Acute Rehab- can tolerate 3h/d PT/OT/SLP and requires daily physician visits

## 2020-07-07 NOTE — PROGRESS NOTE ADULT - NSHPATTENDINGPLANDISCUSS_GEN_ALL_CORE
neurology team
neurology team and daughter
neurology team
NEUROLOGY TEAM

## 2020-07-07 NOTE — EEG REPORT - NS EEG TEXT BOX
Start: 07-06-20  08:00  End: 07-07-20 08:00  Duration: 24 hours    Interpretation:    Daily EEG Visual Analysis  FINDINGS:  The background was continuous, diffuse delta and theta activity, spontaneously variable and reactive. During wakefulness, the posterior dominant rhythm consisted of a well-modulated 7.5 Hz activity in the left posterior quadrant, with amplitude to 30 uV, that attenuated to eye opening.  Low amplitude frontal beta was noted in wakefulness.    Background Slowing:  -diffuse generalized theta and delta slowing.    Focal Slowing:   -Continuous polymorphic delta and theta slowing in the right hemisphere    Sleep Background: no stage 2 sleep transients noted     Other Non-Epileptiform Findings:  None were present.    Interictal Epileptiform Activity:   - none were appreciated     Events:  - no event recorded.     Artifacts:  Intermittent myogenic and movement artifacts were noted. EKG artifacts noted in T9 and P9.     ECG:  The heart rate on single channel ECG was predominantly between 60-80 BPM.    _____________________________________________________________  EEG SUMMARY/CLASSIFICATION  Abnormal EEG in the awake, drowsy and asleep states.  1. continuous polymorphic delta and theta slowing, focal slowing note in right hemisphere  4. background slowing, generalized.   _____________________________________________________________  EEG IMPRESSION/CLINICAL CORRELATE  This is an abnormal EEG study due to the presence of focal status epilepticus over the right posterior quadrant. No further seizures seen after 7/6 at 10p.  There is also evidence of a structural abnormality in the right hemisphere and of moderate diffuse nonspecific cerebral dysfunction.    Present recording much improved compared to yesterday and day before yesterday.       Prelim read   Nadir Lemons MD  Epilepsy Fellow     Dominic Owens MD  Epilepsy Attending Start: 07-06-20  08:00  End: 07-07-20 08:00  Duration: 24 hours    Interpretation:    Daily EEG Visual Analysis  FINDINGS:  The background was continuous, diffuse delta and theta activity, spontaneously variable and reactive. During wakefulness, the posterior dominant rhythm consisted of a well-modulated 7.5 Hz activity in the left posterior quadrant, with amplitude to 30 uV, that attenuated to eye opening.  Low amplitude frontal beta was noted in wakefulness.    Background Slowing:  -diffuse generalized theta and delta slowing.    Focal Slowing:   -Continuous polymorphic delta and theta slowing in the right hemisphere    Sleep Background: no stage 2 sleep transients noted     Other Non-Epileptiform Findings:  None were present.    Interictal Epileptiform Activity:   - none were appreciated     Events:  - no event recorded.     Artifacts:  Intermittent myogenic and movement artifacts were noted. EKG artifacts noted in T9 and P9.     ECG:  The heart rate on single channel ECG was predominantly between 60-80 BPM.    _____________________________________________________________  EEG SUMMARY/CLASSIFICATION  Abnormal EEG in the awake, drowsy and asleep states.  1. continuous polymorphic delta and theta slowing, focal slowing note in right hemisphere  4. background slowing, generalized.   _____________________________________________________________  EEG IMPRESSION/CLINICAL CORRELATE  This is an abnormal EEG study due to the presence focal slowing in the right hemisphere and diffuse background slowing.       Present recording much improved compared to yesterday and day before yesterday.       Prelim read   Nadir Lemons MD  Epilepsy Fellow     Dominic Owens MD  Epilepsy Attending Start: 07-06-20  08:00  End: 07-07-20 08:00  Duration: 24 hours    Interpretation:    Daily EEG Visual Analysis  FINDINGS:  The background was continuous, diffuse delta and theta activity, spontaneously variable and reactive. During wakefulness, the posterior dominant rhythm consisted of a well-modulated 7.5 Hz activity in the left posterior quadrant, with amplitude to 30 uV, that attenuated to eye opening.  Low amplitude frontal beta was noted in wakefulness.    Background Slowing:  -diffuse generalized theta and delta slowing.    Focal Slowing:   -Continuous polymorphic delta and theta slowing in the right hemisphere    Sleep Background: no stage 2 sleep transients noted     Other Non-Epileptiform Findings:  None were present.    Interictal Epileptiform Activity:   - none were appreciated     Events:  - no event recorded.     Artifacts:  Intermittent myogenic and movement artifacts were noted. EKG artifacts noted in T9 and P9.     ECG:  The heart rate on single channel ECG was predominantly between 60-80 BPM.    _____________________________________________________________  EEG SUMMARY/CLASSIFICATION  Abnormal EEG in the awake, drowsy and asleep states.  1. continuous polymorphic delta and theta slowing, focal slowing note in right hemisphere  2. background slowing, generalized.   _____________________________________________________________  EEG IMPRESSION/CLINICAL CORRELATE  This is an abnormal EEG study due to the presence focal slowing in the right hemisphere and diffuse background slowing.       Present recording much improved compared to yesterday and day before yesterday.       Prelim read   Nadir Lemons MD  Epilepsy Fellow     Dominic Owens MD  Epilepsy Attending Start: 07-06-20  08:00  End: 07-07-20 08:00  Duration: 24 hours    Interpretation:    Daily EEG Visual Analysis  FINDINGS:  The background was continuous, diffuse delta and theta activity, spontaneously variable and reactive. During wakefulness, the posterior dominant rhythm consisted of a well-modulated 7.5 Hz activity in the left posterior quadrant, with amplitude to 30 uV, that attenuated to eye opening.  Low amplitude frontal beta was noted in wakefulness.    Background Slowing:  -diffuse generalized theta and delta slowing.    Focal Slowing:   -Continuous polymorphic delta and theta slowing in the right hemisphere    Sleep Background: no stage 2 sleep transients noted     Other Non-Epileptiform Findings:  None were present.    Interictal Epileptiform Activity:   - none were appreciated     Events:  - no event recorded.     Artifacts:  Intermittent myogenic and movement artifacts were noted. EKG artifacts noted in T9 and P9.     ECG:  The heart rate on single channel ECG was predominantly between 60-80 BPM.    _____________________________________________________________  EEG SUMMARY/CLASSIFICATION  Abnormal EEG in the awake, drowsy and asleep states.  1. continuous polymorphic delta and theta slowing, focal slowing note in right hemisphere  2. background slowing, generalized.   _____________________________________________________________  EEG IMPRESSION/CLINICAL CORRELATE  This is an abnormal EEG study due to the presence focal slowing in the right hemisphere and diffuse background slowing.       Present recording much improved compared to yesterday and day before yesterday.     Nadir Lemons MD  Epilepsy Fellow     Dominic Owens MD  Epilepsy Attending

## 2020-07-08 NOTE — H&P ADULT - ATTENDING COMMENTS
Patient and seen and examined. I agree with note/plan.   S/p CVA  PT/oT/Speech evals   ICU Vital Signs Last 24 Hrs  T(C): 36.8 (09 Jul 2020 08:57), Max: 36.9 (08 Jul 2020 20:15)  T(F): 98.2 (09 Jul 2020 08:57), Max: 98.4 (08 Jul 2020 20:15)  HR: 77 (09 Jul 2020 08:57) (77 - 92)  BP: 132/84 (09 Jul 2020 08:57) (116/70 - 152/85)  BP(mean): --  ABP: --  ABP(mean): --  RR: 14 (09 Jul 2020 08:57) (14 - 15)  SpO2: 99% (09 Jul 2020 08:57) (96% - 99%)

## 2020-07-08 NOTE — DISCHARGE NOTE NURSING/CASE MANAGEMENT/SOCIAL WORK - NSDCPEPTSTRK_GEN_ALL_CORE
Prescribed medications/Stroke warning signs and symptoms/Signs and symptoms of stroke/Call 911 for stroke/Stroke education booklet/Need for follow up after discharge/Stroke support groups for patients, families, and friends/Risk factors for stroke

## 2020-07-08 NOTE — DISCHARGE NOTE PROVIDER - CARE PROVIDERS DIRECT ADDRESSES
,[email or web address]

## 2020-07-08 NOTE — DISCHARGE NOTE NURSING/CASE MANAGEMENT/SOCIAL WORK - PATIENT PORTAL LINK FT
You can access the FollowMyHealth Patient Portal offered by [facility] by registering at the following website: [email or web address]. By joining [employer]’s FollowMyHealth portal, you will also be able to view your health information using other applications (apps) compatible with our system.

## 2020-07-08 NOTE — PROGRESS NOTE ADULT - SUBJECTIVE AND OBJECTIVE BOX
Patient is a 86y old  Female who presents with a chief complaint of     Tolerating therapies.  No c/os.    MEDICATIONS  (STANDING):  aspirin enteric coated 81 milliGRAM(s) Oral daily  atorvastatin 40 milliGRAM(s) Oral at bedtime  cefTRIAXone   IVPB 1000 milliGRAM(s) IV Intermittent every 24 hours  citalopram 10 milliGRAM(s) Oral daily  diVALproex  milliGRAM(s) Oral <User Schedule>  enoxaparin Injectable 40 milliGRAM(s) SubCutaneous every 24 hours  lacosamide 100 milliGRAM(s) Oral two times a day  memantine 5 milliGRAM(s) Oral two times a day  metoprolol succinate ER 50 milliGRAM(s) Oral daily  sodium chloride 0.9%. 1000 milliLiter(s) (85 mL/Hr) IV Continuous <Continuous>    MEDICATIONS  (PRN):  lacosamide IVPB 100 milliGRAM(s) IV Intermittent once PRN for seizures refractory to Ativan >  3mins    Vital Signs Last 24 Hrs  T(C): 36.6 (08 Jul 2020 11:08), Max: 36.6 (07 Jul 2020 19:45)  T(F): 97.8 (08 Jul 2020 11:08), Max: 97.8 (07 Jul 2020 19:45)  HR: 80 (08 Jul 2020 11:08) (72 - 91)  BP: 130/80 (08 Jul 2020 11:08) (129/68 - 147/87)  BP(mean): --  RR: 18 (08 Jul 2020 11:08) (18 - 18)  SpO2: 96% (08 Jul 2020 11:08) (95% - 99%)    PHYSICAL EXAM  Constitutional - NAD, Comfortable, getting EEG done  HEENT - NCAT, EOMI  Neck - Supple, No limited ROM  Extremities - No C/C/E, No calf tenderness   Neurologic Exam -                 Alert  Follows verbal instruction  Motor non-focal without deficits.  AAO x 2  Speech hypophonic but appropriate.      Psychiatric - Mood stable, Affect WNL    Impression:  81 yo with functional deficits secondary to diagnosis of CVA and Seizures    Plan:  PT- ROM, Bed Mob, Transfers, Amb w AD   OT- ADLs, cognition  SLP- Dysphagia eval and treat  Prec- Falls, Cardiac, seizure  DVT Prophylaxis- Lovenox  Skin- Turn q2 h  Dispo- Acute Rehab- can tolerate 3h/d PT/OT/SLP and requires daily physician visits

## 2020-07-08 NOTE — EEG REPORT - NS EEG TEXT BOX
[facility]   COMPREHENSIVE EPILEPSY CENTER   REPORT OF LONG-TERM VIDEO EEG     [facility]: [address], [place] [address], Ph#: [phone]    Patient Name: SARA FARAH  Age and : 86y (34)  MRN #: [id number]  Location: [place] [address]  Referring Physician: Patricia Plummer    Start Time/Date:  08:00 on 20  End Time/Date: 08:00 on 20  Duration: 24 hours     _____________________________________________________________  STUDY INFORMATION    EEG Recording Technique:  The patient underwent continuous Video-EEG monitoring, using Telemetry System hardware on the XLTek Digital System. EEG and video data were stored on a computer hard drive with important events saved in digital archive files. The material was reviewed by a physician (electroencephalographer / epileptologist) on a daily basis. Beni and seizure detection algorithms were utilized and reviewed. An EEG Technician attended to the patient, and was available throughout daytime work hours.  The epilepsy center neurologist was available in person or on call 24-hours per day.    EEG Placement and Labeling of Electrodes:  The EEG was performed utilizing 20 channel referential EEG connections (coronal over temporal over parasagittal montage) using all standard 10-20 electrode placements with EKG, with additional electrodes placed in the inferior temporal region using the modified 10-10 montage electrode placements for elective admissions, or if deemed necessary. Recording was at a sampling rate of 256 samples per second per channel. Time synchronized digital video recording was done simultaneously with EEG recording. A low light infrared camera was used for low light recording.     _____________________________________________________________  HISTORY    Patient is a 86y old  Female who presents with a chief complaint of seizures found to have acute stroke.     PERTINENT MEDICATION:  MEDICATIONS  (STANDING):  cefTRIAXone   IVPB 1000 milliGRAM(s) IV Intermittent every 24 hours  diVALproex  milliGRAM(s) Oral <User Schedule>  lacosamide 100 milliGRAM(s) Oral two times a day    _____________________________________________________________  STUDY INTERPRETATION    Findings: The background was continuous, spontaneously variable and reactive. During wakefulness, the posterior dominant rhythm consisted of symmetric, well-modulated xx Hz activity, with amplitude to 30 uV, that attenuated to eye opening.  Low amplitude frontal beta was noted in wakefulness.    Entire recording captured drowsy and asleep states only; no posterior dominant rhythm seen.    Background was asymmetric: xx hemisphere predominantly consisted of ;  xx hemisphere predominantly consisted of    The background was diffusely suppressed, invariable and nonreactive. No posterior dominant rhythm seen.    No posterior dominant rhythm seen.    Background Slowing:  No generalized background slowing was present.  Excess diffuse polymorphic delta slowing.  Diffuse theta and polymorphic delta slowing.  Background predominantly consisted of theta, delta and faster activities.    Focal Slowing:   None were present.  Intermittent theta/delta slowing in the  region.  Intermittent polymorphic delta slowing in the  region.  Abundant intermittent theta/delta slowing in the  region.  Near continuous theta/delta slowing in the  region.  Continuous theta/delta slowing in the  region.     Sleep Background:  Drowsiness was characterized by fragmentation, attenuation, and slowing of the background activity.    Sleep was characterized by the presence of vertex waves, symmetric sleep spindles and K-complexes.  Sleep was characterized by the presence of symmetric, rudimentary sleep spindles and K-complexes.  Drowsiness and stage II sleep transients were not recorded.  Stage II sleep transients were not recorded.    Other Non-Epileptiform Findings:  None were present.  Diffuse excess beta activity.  Attenuation of fast activity over the .  Attenuation of voltage over the .  Breach effect max in xx characterized by higher amplitude, sharply contoured waves and fast activities.    Interictal Epileptiform Activity:   None were present.    Events:  Clinical events: None recorded.  Seizures: None recorded.    Activation Procedures:   Hyperventilation was not performed.    Photic stimulation was not performed.     Artifacts:  Intermittent myogenic and movement artifacts were noted.    ECG:  The heart rate on single channel ECG was predominantly between xx BPM.    _____________________________________________________________  EEG SUMMARY/CLASSIFICATION    Normal / Abnormal EEG in the awake, drowsy and asleep states.  Abnormal EEG in an altered / a sedated patient.  -   - Moderate generalized slowing.    _____________________________________________________________  EEG IMPRESSION/CLINICAL CORRELATE    Normal EEG study.  No epileptiform pattern or seizure seen.    Otherwise normal EEG, except for diffuse excess beta activity, which may be seen with medication use such as benzodiazepines or barbiturates.      Abnormal EEG study.  1. Potential epileptogenic focus in the  Diffuse cortical hyperexcitability with GPD.   Cortical hyperexcitability in the   Cortical dysfunction in the   Structural or functional abnormality in the   Moderate nonspecific diffuse or multifocal cerebral dysfunction.   No epileptiform pattern or seizure seen.    Skull defect max in the   Diffuse excess beta activity may be seen with medication use such as benzodiazepines or barbiturates.    _____________________________________________________________    Prelim read   Nadir Lemons   Epilepsy Fellow  [facility] [place] [facility]   COMPREHENSIVE EPILEPSY CENTER   REPORT OF LONG-TERM VIDEO EEG     [facility]: [address], [place] [address], Ph#: [phone]    Patient Name: SARA FARAH  Age and : 86y (34)  MRN #: [id number]  Location: [place] [address]  Referring Physician: Patricia Plummer    Start Time/Date:  08:00 on 20  End Time/Date: 08:00 on 20  Duration: 24 hours     _____________________________________________________________  STUDY INFORMATION    EEG Recording Technique:  The patient underwent continuous Video-EEG monitoring, using Telemetry System hardware on the XLTek Digital System. EEG and video data were stored on a computer hard drive with important events saved in digital archive files. The material was reviewed by a physician (electroencephalographer / epileptologist) on a daily basis. Beni and seizure detection algorithms were utilized and reviewed. An EEG Technician attended to the patient, and was available throughout daytime work hours.  The epilepsy center neurologist was available in person or on call 24-hours per day.    EEG Placement and Labeling of Electrodes:  The EEG was performed utilizing 20 channel referential EEG connections (coronal over temporal over parasagittal montage) using all standard 10-20 electrode placements with EKG, with additional electrodes placed in the inferior temporal region using the modified 10-10 montage electrode placements for elective admissions, or if deemed necessary. Recording was at a sampling rate of 256 samples per second per channel. Time synchronized digital video recording was done simultaneously with EEG recording. A low light infrared camera was used for low light recording.     _____________________________________________________________  HISTORY    Patient is a 86y old  Female who presents with a chief complaint of seizures found to have acute stroke.     PERTINENT MEDICATION:  MEDICATIONS  (STANDING):  cefTRIAXone   IVPB 1000 milliGRAM(s) IV Intermittent every 24 hours  diVALproex  milliGRAM(s) Oral <User Schedule>  lacosamide 100 milliGRAM(s) Oral two times a day    _____________________________________________________________  STUDY INTERPRETATION    Findings: The background was continuous, spontaneously variable and reactive. During wakefulness, the posterior dominant rhythm consisted of symmetric, 7 Hz activity, with amplitude to 30 uV, that attenuated to eye opening.  Low amplitude frontal beta was noted in wakefulness.    Background Slowing:  Excess diffuse theta slowing.    Focal Slowing:   Intermittent at a times continuos theta slowing in the right posterior quadrant region.    Sleep Background:  Drowsiness and stage II sleep transients were not recorded.    Other Non-Epileptiform Findings:  None were present.    Interictal Epileptiform Activity:   None were present.    Events:  Clinical events: None recorded.  Seizures: None recorded.    Activation Procedures:   Hyperventilation was not performed.    Photic stimulation was not performed.     Artifacts:  Intermittent myogenic and movement artifacts were noted.    ECG:  The heart rate on single channel ECG was predominantly between 78-90 BPM.    _____________________________________________________________  EEG SUMMARY/CLASSIFICATION    Abnormal EEG in the awake, drowsy and asleep states.    - Intermittent at a times continuos theta slowing, focal, right posterior quadrant region.  _____________________________________________________________    EEG IMPRESSION/CLINICAL CORRELATE    Abnormal EEG study.  1. Structural or functional abnormality in the right posterior quadrant region.   _____________________________________________________________    Prelim read   Nadir Lemons   Epilepsy Fellow  [facility]      Dominic Owens MD  Epilepsy Attending [facility]   COMPREHENSIVE EPILEPSY CENTER   REPORT OF LONG-TERM VIDEO EEG     [facility]: [address], [place] [address], Ph#: [phone]    Patient Name: SARA FARAH  Age and : 86y (34)  MRN #: [id number]  Location: [place] [address]  Referring Physician: Patricia Plummer    Start Time/Date:  08:00 on 20  End Time/Date: 08:00 on 20  Duration: 24 hours     _____________________________________________________________  STUDY INFORMATION    EEG Recording Technique:  The patient underwent continuous Video-EEG monitoring, using Telemetry System hardware on the XLTek Digital System. EEG and video data were stored on a computer hard drive with important events saved in digital archive files. The material was reviewed by a physician (electroencephalographer / epileptologist) on a daily basis. Beni and seizure detection algorithms were utilized and reviewed. An EEG Technician attended to the patient, and was available throughout daytime work hours.  The epilepsy center neurologist was available in person or on call 24-hours per day.    EEG Placement and Labeling of Electrodes:  The EEG was performed utilizing 20 channel referential EEG connections (coronal over temporal over parasagittal montage) using all standard 10-20 electrode placements with EKG, with additional electrodes placed in the inferior temporal region using the modified 10-10 montage electrode placements for elective admissions, or if deemed necessary. Recording was at a sampling rate of 256 samples per second per channel. Time synchronized digital video recording was done simultaneously with EEG recording. A low light infrared camera was used for low light recording.     _____________________________________________________________  HISTORY    Patient is a 86y old  Female who presents with a chief complaint of seizures found to have acute stroke.     PERTINENT MEDICATION:  MEDICATIONS  (STANDING):  cefTRIAXone   IVPB 1000 milliGRAM(s) IV Intermittent every 24 hours  diVALproex  milliGRAM(s) Oral <User Schedule>  lacosamide 100 milliGRAM(s) Oral two times a day    _____________________________________________________________  STUDY INTERPRETATION    Findings: The background was continuous, spontaneously variable and reactive. During wakefulness, the posterior dominant rhythm consisted of symmetric, 7 Hz activity, with amplitude to 30 uV, that attenuated to eye opening.  Low amplitude frontal beta was noted in wakefulness.    Background Slowing:  Excess diffuse theta slowing.    Focal Slowing:   Intermittent at a times continuos theta slowing in the right posterior quadrant region.    Sleep Background:  Drowsiness and stage II sleep transients were not recorded.    Other Non-Epileptiform Findings:  None were present.    Interictal Epileptiform Activity:   None were present.    Events:  Clinical events: None recorded.  Seizures: None recorded.    Activation Procedures:   Hyperventilation was not performed.    Photic stimulation was not performed.     Artifacts:  Intermittent myogenic and movement artifacts were noted.    ECG:  The heart rate on single channel ECG was predominantly between 78-90 BPM.    _____________________________________________________________  EEG SUMMARY/CLASSIFICATION    Abnormal EEG in the awake, drowsy and asleep states.    - Intermittent at a times continuos theta slowing, focal, right posterior quadrant region.  _____________________________________________________________    EEG IMPRESSION/CLINICAL CORRELATE    Abnormal EEG study.  1. Structural or functional abnormality in the right posterior quadrant region.   _____________________________________________________________      Nadir Lemons   Epilepsy Fellow  [facility] [place]    Dominic Owens MD   Epilepsy Attending  [facility] [place] [facility]   COMPREHENSIVE EPILEPSY CENTER   REPORT OF LONG-TERM VIDEO EEG     [facility]: [address], [place] [address], Ph#: [phone]    Patient Name: SARA FARAH  Age and : 86y (34)  MRN #: [id number]  Location: [place] [address]  Referring Physician: Patricia Plummer    Start Time/Date:  08:00 on 20  End Time/Date: 08:00 on 20  Duration: 24 hours     _____________________________________________________________  STUDY INFORMATION    EEG Recording Technique:  The patient underwent continuous Video-EEG monitoring, using Telemetry System hardware on the XLTek Digital System. EEG and video data were stored on a computer hard drive with important events saved in digital archive files. The material was reviewed by a physician (electroencephalographer / epileptologist) on a daily basis. Beni and seizure detection algorithms were utilized and reviewed. An EEG Technician attended to the patient, and was available throughout daytime work hours.  The epilepsy center neurologist was available in person or on call 24-hours per day.    EEG Placement and Labeling of Electrodes:  The EEG was performed utilizing 20 channel referential EEG connections (coronal over temporal over parasagittal montage) using all standard 10-20 electrode placements with EKG, with additional electrodes placed in the inferior temporal region using the modified 10-10 montage electrode placements for elective admissions, or if deemed necessary. Recording was at a sampling rate of 256 samples per second per channel. Time synchronized digital video recording was done simultaneously with EEG recording. A low light infrared camera was used for low light recording.     _____________________________________________________________  HISTORY    Patient is a 86y old  Female who presents with a chief complaint of seizures found to have acute stroke.     PERTINENT MEDICATION:  MEDICATIONS  (STANDING):  cefTRIAXone   IVPB 1000 milliGRAM(s) IV Intermittent every 24 hours  diVALproex  milliGRAM(s) Oral <User Schedule>  lacosamide 100 milliGRAM(s) Oral two times a day    _____________________________________________________________  STUDY INTERPRETATION    Findings: The background was continuous, spontaneously variable and reactive. During wakefulness, the posterior dominant rhythm consisted of symmetric, 7 Hz activity, with amplitude to 30 uV, that attenuated to eye opening.  Low amplitude frontal beta was noted in wakefulness.    Background Slowing:  Excess diffuse theta slowing.    Focal Slowing:   Intermittent at a times continuos theta slowing in the right posterior quadrant region.    Sleep Background:  Drowsiness and stage II sleep transients were not recorded.    Other Non-Epileptiform Findings:  None were present.    Interictal Epileptiform Activity:   None were present.    Events:  Clinical events: None recorded.  Seizures: None recorded.    Activation Procedures:   Hyperventilation was not performed.    Photic stimulation was not performed.     Artifacts:  Intermittent myogenic and movement artifacts were noted.    ECG:  The heart rate on single channel ECG was predominantly between 78-90 BPM.    _____________________________________________________________  EEG SUMMARY/CLASSIFICATION    Abnormal EEG in the awake, drowsy and asleep states.    - Intermittent at a times continuos delta and theta slowing, focal, right posterior quadrant region.  _____________________________________________________________    EEG IMPRESSION/CLINICAL CORRELATE    Abnormal EEG study.  1. Structural or functional abnormality in the right posterior quadrant region.   _____________________________________________________________      Nadir Lemons   Epilepsy Fellow  [facility] [place]    Dominic Owens MD   Epilepsy Attending  [facility] [place]

## 2020-07-08 NOTE — DISCHARGE NOTE PROVIDER - HOSPITAL COURSE
83 yo woman with history of multiple strokes of unknown etiology (w/u inc ILR negative follows with Dr. Lawrence), carotid endarterectomy, mild-mod cognitive dysfx but can be AAOx2-3, hx of seizures (multiple GTCs in 2012, was on keppra but taken off all AEDs for past 4 years), CAD, multiple bypass tx on ASA 81 from [place] for concern for seizures. As per family, patient has been feeling "off" for about 1 week, with intermittent visual hallucinations (sees animals and scintillating scotomas), saying things that don't make sense, also having L head turning and staring spells. Per daughter patient was home today had acute onset of visual hallucinations noted to have unsteady gait and left sided focal shaking. Per daughter, prior to 1 week pt was independently ambulating and doing "yoga routines." Taken to [place], where CTH with chronic infarcts. Given Ativan 2mg IVP for left sided shaking became lethargic and hypotensive but improved rapidly. Tx to [facility] for cEEG monitoring. Seen in ED in [place], alert, awake, L hemiparesis. (30 Jun 2020 14:15)        Hospital Course    MRI noted Acute right occipital lobe PCA distribution infarct. Multiple chronic cerebral infarcts as described above with hemosiderin deposition within the right frontal and right occipital regions.    Chronic right cerebellar infarcts.    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 No further seizures seen after 7/6 at 10p.  There is also evidence of a structural abnormality in the right hemisphere and of moderate diffuse nonspecific cerebral dysfunction. Course also complicated by UTI.        PT's mentation improved once seizure control was achieved. Most of her seizures were subclinical after the initial left sided motor semiology. Completed 5 days of Ceftriaxone IVPB also.         Switched to PO AEDS. Seen by PM&R.        Family's questions and concerns were addressed. They voiced understanding.        Pt is neurologically stable for discharge to rehab. 81 yo woman with history of multiple strokes of unknown etiology (w/u inc ILR negative follows with Dr. Lawrence), carotid endarterectomy, mild-mod cognitive dysfx but can be AAOx2-3, hx of seizures (multiple GTCs in 2012, was on keppra but taken off all AEDs for past 4 years), CAD, multiple bypass tx on ASA 81 from [place] for concern for seizures. As per family, patient has been feeling "off" for about 1 week, with intermittent visual hallucinations (sees animals and scintillating scotomas), saying things that don't make sense, also having L head turning and staring spells. Per daughter patient was home today had acute onset of visual hallucinations noted to have unsteady gait and left sided focal shaking. Per daughter, prior to 1 week pt was independently ambulating and doing "yoga routines." Taken to [place], where CTH with chronic infarcts. Given Ativan 2mg IVP for left sided shaking became lethargic and hypotensive but improved rapidly. Tx to [facility] for cEEG monitoring. Seen in ED in NAD, alert, awake, L hemiparesis. (30 Jun 2020 14:15)        Hospital Course    MRI noted Acute right occipital lobe PCA distribution infarct. Multiple chronic cerebral infarcts as described above with hemosiderin deposition within the right frontal and right occipital regions.    Chronic right cerebellar infarcts.    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 No further seizures seen after 7/6 at 10p.  There is also evidence of a structural abnormality in the right hemisphere and of moderate diffuse nonspecific cerebral dysfunction. Course also complicated by UTI.        PT's mentation improved once seizure control was achieved. Most of her seizures were subclinical after the initial left sided motor semiology. Completed 5 days of Ceftriaxone IVPB also.         Switched to PO AEDS. Seen by PM&R.        Family's questions and concerns were addressed. They voiced understanding.        Pt is neurologically stable for discharge to rehab.    Attempted to wean off of supplemental O2, while awake patients O2 sat is > 95, but while asleep the Sat dropped to 90% without signs of apnea, or obstruction. Patients mental status has improved daily and she should tolerate weaning supplemental Oxygen in the near future as her mental status continues to improve.

## 2020-07-08 NOTE — H&P ADULT - HISTORY OF PRESENT ILLNESS
81 yo female with PMH of multiple strokes of unknown etiology ( ILR negative follows with Dr. Lawrence), carotid endarterectomy, dementia, seizures (multiple GTCs in 2012, was on keppra but taken off all AEDs), CAD, CABG on ASA 81 admitted to [facility] ED on 6/30 as transfer from [facility] ?seizures. As per family, patient has been confused and with visual hallucinations with staring spells days prior. Brought to ED after family noted left sided shaking. CTH 6/30 showed chronic infarcts. Given Ativan and transferred to [facility] for cEEG monitoring. MRI at [facility] showed Acute right occipital PCA infarct. Also, multiple chronic cerebral infarcts bilaterally. Chronic right cerebellar infarcts.  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 No further seizures after 7/6 reported. Discharged on Depakote and Vimpat. Additionally, hospital course complicated by EColi UTI. Iv rocephin x 5 days completed. Evaluated by PM&R. Pt is neurologically stable for discharge to rehab on 7/8.    Of note, attempted to wean off of supplemental O2 (awake O2 sat is > 95, asleep Sat dropped to 90% without signs of apnea),  should tolerate weaning supplemental oxygen per d/c plan.

## 2020-07-08 NOTE — H&P ADULT - NSHPLABSRESULTS_GEN_ALL_CORE
EXAM:  MR BRAIN                            PROCEDURE DATE:  06/30/2020            INTERPRETATION:  INDICATIONS:  Left focal seizures and visual hallucinations. Rule out stroke.    TECHNIQUE:  Multiplanar imaging was performed using T1 weighted, T2 weighted and FLAIR sequences.  Diffusion weighted and susceptibility sensitive images were also obtained.      COMPARISON EXAMINATION:  CT scans 6/30/2020 and 12/5/2016 and MR scan 12/18/2012      FINDINGS: The study is limited by motion artifact.  VENTRICLES AND SULCI:  Ex vacuo dilatation of the right occipital horn is seen. Ventricles and sulci are overall prominent in size compatible with mild to moderate cerebral volume loss.  INTRA-AXIAL:  There is an area of diffusion restriction within theright occipital lobe demonstrating T1/T2 prolongation and mild occipital horn effacement compatible with an acute PCA distribution infarct. An adjacent area of chronic infarction/hemorrhage with susceptibility is seen more medially. Chronic areas of infarction are seen within the bilateral frontal lobes and left parietal lobe with hemosiderin deposition within the right frontal lobe infarct. Scattered right cerebellar chronic infarcts are seen. No midline shift is seen. Scattered areas of white matter T2 hyperintensity are compatible with microvascular-type changes with a lacune within the left centrum semiovale/external capsule.  EXTRA-AXIAL:  No mass or collection.  VISUALIZED SINUSES:  Normal.  VISUALIZED MASTOIDS:  Mild abnormal soft tissue  CALVARIUM:  Normal.  CAROTID FLOW VOIDS:  Present.  MISCELLANEOUS:  None.      IMPRESSION:    Motion limited exam.    Acute right occipital lobe PCA distribution infarct.    Multiple chronic cerebral infarcts as described above with hemosiderin deposition within the right frontal and right occipital regions.    Chronic right cerebellar infarcts.    Findings were discussed with SAYDA Carreon on 7/1/2020 8:27 AM with read back.                    OVIDIO VAZQUEZ M.D., ATTENDING RADIOLOGIST  This document has been electronically signed. Jul 1 2020  8:28AM

## 2020-07-08 NOTE — H&P ADULT - ASSESSMENT
SARA FARAH is a 85yo Female with hx multiple b/l CVas, now s/p acute right PCA CVA and decreased functional mobility, gait instability and ADL impairments.    #CVA  -ASA/Lipitor continues.  -start PT/OT/SLP  -fall and aspiration precautions  -puree diet      #Seizures  -depakote and Vimpat continues  -seizure precautions      #HTN  -Toprol  -monitor VS closely  -medicine follow up    #Dementia  -Nemenda BID  -neuropsychological eval    #UTI  -completed antibiotics x5d course for EColi UTI  -check PVR      COMORBIDITES/ACTIVE MEDICAL ISSUES     Gait Instability, ADL impairments and Functional impairments: start Comprehensive Rehab Program of PT/OT       Pain Mgmt - Tylenol PRN  GI/Bowel Mgmt - Colace, Senna,  Miralax PRN  /Bladder Mgmt - Voiding independently, PVR      Precautions / PROPHYLAXIS:   - Falls, Cardiac, Seizure   - Ortho: Weight bearing status: WBAT   - Lungs: Aspiration, Incentive Spirometer   - Pressure injury/Skin: Turn Q2hrs while in bed, OOB to Chair, PT/OT    - DVT: Lovenox    MEDICAL PROGNOSIS: GOOD            REHAB POTENTIAL: GOOD             ESTIMATED DISPOSITION: HOME WITH HOME CARE            ELOS: 10-14 Days   EXPECTED THERAPY:     P.T. 1hr/day       O.T. 1hr/day      S.L.P. 1hr/day     P&O Unnecessary     EXP FREQUENCY: 5 days per 7 day period     PRESCREEN COMPARISION:   I have reviewed the prescreen information and I have found no relevant changes between the preadmission screening and my post admission evaluation     RATIONALE FOR INPATIENT ADMISSION - Patient demonstrates the following: (check all that apply)  [X] Medically appropriate for rehabilitation admission  [X] Has attainable rehab goals with an appropriate initial discharge plan  [X] Has rehabilitation potential (expected to make a significant improvement within a reasonable period of time)   [X] Requires close medical management by a rehab physician, rehab nursing care, Hospitalist and comprehensive interdisciplinary team (including PT, OT, & or SLP, Prosthetics and Orthotics) SARA FARAH is a 85yo Female with hx multiple b/l CVas, now s/p acute right PCA CVA and decreased functional mobility, gait instability and ADL impairments.    #CVA  -ASA/Lipitor continues.  -start PT/OT/SLP  -fall and aspiration precautions  -puree diet      #Seizures  -depakote and Vimpat continues  -seizure precautions  -neurology follow up outpt      #HTN  -Toprol  -monitor VS closely  -medicine follow up    #Dementia  -Nemenda BID  -neuropsychological eval    #UTI  -completed antibiotics x5d course for EColi UTI  -check PVR    #Hx O2 desaturation  -O2 prn via NC  -keep O2>91%    COMORBIDITES/ACTIVE MEDICAL ISSUES     Gait Instability, ADL impairments and Functional impairments: start Comprehensive Rehab Program of PT/OT       Pain Mgmt - Tylenol PRN  GI/Bowel Mgmt - Colace, Senna,  Miralax PRN  /Bladder Mgmt - Voiding independently, PVR      Precautions / PROPHYLAXIS:   - Falls, Cardiac, Seizure   - Ortho: Weight bearing status: WBAT   - Lungs: Aspiration, Incentive Spirometer   - Pressure injury/Skin: Turn Q2hrs while in bed, OOB to Chair, PT/OT    - DVT: Lovenox    MEDICAL PROGNOSIS: GOOD            REHAB POTENTIAL: GOOD             ESTIMATED DISPOSITION: HOME WITH HOME CARE            ELOS: 10-14 Days   EXPECTED THERAPY:     P.T. 1hr/day       O.T. 1hr/day      S.L.P. 1hr/day     P&O Unnecessary     EXP FREQUENCY: 5 days per 7 day period     PRESCREEN COMPARISION:   I have reviewed the prescreen information and I have found no relevant changes between the preadmission screening and my post admission evaluation     RATIONALE FOR INPATIENT ADMISSION - Patient demonstrates the following: (check all that apply)  [X] Medically appropriate for rehabilitation admission  [X] Has attainable rehab goals with an appropriate initial discharge plan  [X] Has rehabilitation potential (expected to make a significant improvement within a reasonable period of time)   [X] Requires close medical management by a rehab physician, rehab nursing care, Hospitalist and comprehensive interdisciplinary team (including PT, OT, & or SLP, Prosthetics and Orthotics)

## 2020-07-08 NOTE — H&P ADULT - NSHPSOCIALHISTORY_GEN_ALL_CORE
FUNCTIONAL HISTORY:   Lives with  and grandchildren.  PTA Independent FUNCTIONAL HISTORY:   Lives with , dtr and grandchildren. 100ft walkway to enter house, no steps.  PTA Independent

## 2020-07-08 NOTE — DISCHARGE NOTE PROVIDER - CARE PROVIDER_API CALL
Maury Lawrence  NEUROLOGY  [address] [place] [id number]  Phone: [phone]  Fax: [phone]  Follow Up Time:     Patricia Plummer  EEG/EPILEPSY  [address] [facility] ,[address]  [place] [address]  Phone: [phone]  Fax: [phone]  Follow Up Time:

## 2020-07-08 NOTE — DISCHARGE NOTE PROVIDER - NSDCMRMEDTOKEN_GEN_ALL_CORE_FT
Aspirin Enteric Coated 81 mg oral delayed release tablet: 1 tab(s) orally once a day  atorvastatin 40 mg oral tablet: 1 tab(s) orally once a day  Calcium 600+D: 1 tab(s) orally once a day  Centrum oral tablet: 1 tab(s) orally once a day  citalopram 10 mg oral tablet: 1 tab(s) orally once a day  Co Q-10 100 mg oral capsule: 2 cap(s) orally once a day  divalproex sodium 500 mg oral tablet, extended release: 1 tab(s) orally   Fish Oil 1000 mg oral capsule: 2 cap(s) orally once a day  isosorbide mononitrate 30 mg oral tablet, extended release: 1 tab(s) orally 2 times a day  lacosamide 100 mg oral tablet: 1 tab(s) orally 2 times a day  memantine 5 mg oral tablet: 1 tab(s) orally once a day  Metoprolol Succinate ER 50 mg oral tablet, extended release: 1 tab(s) orally once a day  Vitamin B-12 500 mcg oral tablet: 1 tab(s) orally once a day Aspirin Enteric Coated 81 mg oral delayed release tablet: 1 tab(s) orally once a day  atorvastatin 40 mg oral tablet: 1 tab(s) orally once a day  Calcium 600+D: 1 tab(s) orally once a day  Centrum oral tablet: 1 tab(s) orally once a day  citalopram 10 mg oral tablet: 1 tab(s) orally once a day  Co Q-10 100 mg oral capsule: 2 cap(s) orally once a day  divalproex sodium 500 mg oral tablet, extended release: 1 tab(s) orally   Fish Oil 1000 mg oral capsule: 2 cap(s) orally once a day  isosorbide mononitrate 30 mg oral tablet, extended release: 1 tab(s) orally 2 times a day  lacosamide 100 mg oral tablet: 1 tab(s) orally 2 times a day  memantine 5 mg oral tablet: 1 tab(s) orally once a day  Metoprolol Succinate ER 50 mg oral tablet, extended release: 1 tab(s) orally once a day  Supplemental Oxygen: 2 liters via nasal cannula  Vitamin B-12 500 mcg oral tablet: 1 tab(s) orally once a day

## 2020-07-08 NOTE — DISCHARGE NOTE PROVIDER - NSDCCPCAREPLAN_GEN_ALL_CORE_FT
PRINCIPAL DISCHARGE DIAGNOSIS  Diagnosis: Status epilepticus  Assessment and Plan of Treatment: Please follow up with neurologist in 2 weeks. Continue taking medications as prescribed. Monitor your blood pressure. Reduce fat, cholesterol and salt in your diet. Increase intake of fruits and vegetables. Limit alcohol to minimum and do not smoke. You may be at risk for falling, make changes to your home to help you walk easier. Keep up to date on vaccinations.  If you experience any symptoms of facial drooping, slurred speech, arm or leg weakness, severe headache, vision changes or any worsening symptoms, notify provider immediatley and return to ER.        SECONDARY DISCHARGE DIAGNOSES  Diagnosis: Stroke  Assessment and Plan of Treatment: continue taking aspirin. Please follow up with your vascular neurologist Dr. Lawrence in 2 weeks for further evaluation. PRINCIPAL DISCHARGE DIAGNOSIS  Diagnosis: Status epilepticus  Assessment and Plan of Treatment: Please follow up with neurologist in 2 weeks. Continue taking medications as prescribed. Monitor your blood pressure. Reduce fat, cholesterol and salt in your diet. Increase intake of fruits and vegetables. Limit alcohol to minimum and do not smoke. You may be at risk for falling, make changes to your home to help you walk easier. Keep up to date on vaccinations.  If you experience any symptoms of facial drooping, slurred speech, arm or leg weakness, severe headache, vision changes or any worsening symptoms, notify provider immediatley and return to ER.  Patient is on Supplemental Oxygen 2 Liters via Nasal Cannula and is expected to be able to wean off of Oxygen during Acute Rehab admission         SECONDARY DISCHARGE DIAGNOSES  Diagnosis: Stroke  Assessment and Plan of Treatment: continue taking aspirin. Please follow up with your vascular neurologist Dr. Lawrence in 2 weeks for further evaluation.

## 2020-07-08 NOTE — H&P ADULT - NSHPPHYSICALEXAM_GEN_ALL_CORE
CURRENT FUNCTIONAL STATUS:  Mod A transfers, Max A gait. Constitutional - Frail elderly female, NAD, Comfortable in bed  HEENT - NCAT, EOMI  Neck - Supple, No limited ROM  Chest - CTA bilaterally  Cardiovascular - RR  Abdomen - BS+, Soft, NTND  Extremities - No C/C/E, No calf tenderness   Skin-no rash  Wounds-none      Neurologic Exam -Awake, Alert, AAO to self, hospital, July, confused to day of the week and year. Follows commands well. Fluent speech. Impaired attention and concentration.     Cranial Nerves - EOMI, no nystagmus, mild right ptosis, LVF cut, possible right lower VF cut as well-distractible. Dysphagia.      Motor - 5-/5 strength all extremities, moves all against gravity.                             Sensory - Intact to LT     Reflexes - DTR Intact     Toes are flexor     Coordination/dysmetria - mild dysmetria             Balance - impaired     Psychiatric - Mood stable, Affect WNL    CURRENT FUNCTIONAL STATUS:  Mod A transfers, Max A gait.

## 2020-07-09 NOTE — CONSULT NOTE ADULT - ASSESSMENT
Assessment:    FIM scores: Social Interaction ; Problem Solving ; Memory .  Plan: Individual supportive psychotherapy to monitor cognition, affect/mood, and behavior. Cognitive remediation during speech therapy sessions. Participation in recreation/art therapy in order to have pleasure and mastery experiences and regain/reestablish a sense of routine. Assessment: Pt presents with mild to moderate cognitive deficits (major neurocognitive disorder likely due to multiple etiologies including CVA and hypoxic episodes). She evidenced difficulties in reality orientation, concentration, working memory, delayed recall of verbal material, visuospatial skills and aspects of executive functions (organizational skills, problem solving) and language (repetition, auditory comprehension). Pt's cognitive deficits as showed by testing are consistent with her history of dementia. Her affect was mildly constricted but responsive to humor, and she only reported a few adjustment symptoms including worry, and homesickness. FIM scores: Social Interaction 6; Problem Solving 4; Memory 4.  Plan: Pt does not appear to be under acute distress at present; neuropsychologist remains available. Continue with current psychotropic and antiepileptic medications. Cognitive remediation during speech therapy sessions is strongly recommended. Participation in recreation/art therapy in order to have pleasure and mastery experiences and regain/reestablish a sense of routine.

## 2020-07-09 NOTE — DIETITIAN INITIAL EVALUATION ADULT. - FACTORS AFF FOOD INTAKE
persistent lack of appetite/change in mental status/difficulty chewing/difficulty feeding self/difficulty swallowing

## 2020-07-09 NOTE — CONSULT NOTE ADULT - SUBJECTIVE AND OBJECTIVE BOX
Pt is an 81 y/o female with PMHx of multiple strokes of unknown etiology (ILR negative follows with Dr. Lawrence), carotid endarterectomy, dementia, seizures (multiple GTCs in 2012, was on Keppra but taken off all AEDs), CAD, CABG on ASA 81 admitted to [facility] ED on 6/30 as transfer from [facility] ?seizures. As per family, Pt has been confused and with visual hallucinations with staring spells days prior. Brought to ED after family noted left sided shaking. CTH 6/30 showed chronic infarcts. Given Ativan and transferred to [facility] for cEEG monitoring. MRI at [facility] showed Acute right occipital PCA infarct. Also, multiple chronic cerebral infarcts bilaterally and chronic right cerebellar infarcts. Pt had several EEGs- focal status epilepticus over the right posterior quadrant reported. No further seizures after 7/6 reported. Discharged on Depakote and Vimpat. Additionally, hospital course complicated by EColi UTI. Iv rocephin x 5 days completed. Evaluated by PM&R. Pt is neurologically stable for discharge to rehab on 7/8. Of note, attempted to wean off of supplemental O2 (awake O2 sat is > 95, asleep Sat dropped to 90% without signs of apnea), should tolerate weaning supplemental oxygen per d/c plan.    PMHx:   . Current meds: Please see list in Pt’s chart. Social Hx:   Findings: Pt was seen for an initial assessment of her cognitive and emotional functioning. MoCA was administered; Pt’s results (/30) were in the range. Her scores in mood measures suggested levels of anxiety and depression (GAD7 = /21; PHQ9 = /27). Pt was alert,  Ox3, and cooperative.  Attn/Conc: Simple auditory attention - . Sustained auditory attention - . Concentration - . Working memory for calculations – ( /5 serial 7s).	 Language: Pt’s speech was of  . Naming - . Sentence repetition - . Phonemic fluency - .	Memory: Encoding of 5 words was (/5); delayed verbal recall – (/5, improving to /5 with cueing). LTM was for [place] presidents (/4, improving to /4 with cueing). Visual memory –. Visuospatial: Visuomotor integration – for copy of a 3D figure, was noted. Executive Functions: Set-shifting - . Organizational skills - . Abstract reasoning - . Initiation - . Self-awareness - . Verbal problem solving – .   Emotional functioning: Affect - 	. Mood - ; Pt reported experiencing . On mood measures s/he additionally reported .  Thought processes were.  No abnormal thought contents were observed.  Pt denied any AH/VH. Pt also denied SI/HI/I/P. Insight - WFL. Judgment - fair. Pt is an 81 y/o right-handed female with PMHx of multiple strokes of unknown etiology (ILR negative follows with Dr. Lawrence), carotid endarterectomy, dementia, seizures (multiple GTCs in 2012, was on Keppra but taken off all AEDs), CAD, CABG on ASA 81 admitted to [facility] ED on 6/30 as transfer from [facility] ?seizures. As per family, Pt has been confused and with visual hallucinations with staring spells days prior. Brought to ED after family noted left sided shaking. CTH 6/30 showed chronic infarcts. Given Ativan and transferred to [facility] for cEEG monitoring. MRI at [facility] showed acute right occipital PCA infarct. Also, multiple chronic cerebral infarcts bilaterally and chronic right cerebellar infarcts. Pt had several EEGs- focal status epilepticus over the right posterior quadrant reported. No further seizures after 7/6 reported. Discharged on Depakote and Vimpat. Additionally, hospital course complicated by EColi UTI. Iv rocephin x 5 days completed. Evaluated by PM&R. Pt is neurologically stable for discharge to rehab on 7/8. Of note, attempted to wean off of supplemental O2 (awake O2 sat is > 95, asleep Sat dropped to 90% without signs of apnea), should tolerate weaning supplemental oxygen per d/c plan. PMHx: As noted above. Current meds: Lexapro 5 mg, Namenda 5 mg BID, and Depakote Sprinkle 500 mg BID. Please see list in Pt’s chart. Social Hx: Pt is  and has a adult daughter. She graduated from high school and is a retired . She has hx of dementia; she denied hx of mental illness and substance abuse. Pt is [employer]ism. She enjoys dining out, going to the movies and the [employer].    Findings: Pt was seen for an initial assessment of her cognitive and emotional functioning. MoCA was administered; Pt’s results (20/30) were in the Mild to Moderate Impairment range. Her scores in a geriatric mood measure suggested minimal levels of depression (GDS = 1/15). Pt was alert, partly Ox3 (disoriented to exact date, hospital floor, city and county, and required a minimal cue for the state), and cooperative. Attn/Conc: Simple auditory attention - intact.  Concentration - impaired for digits backwards. Working memory: Mildly impaired for spelling backwards (3/5) and moderately impaired for calculations (1/5 serial 7s). Language: Pt’s speech was of normal volume, pitch and pace. Naming - intact. Sentence repetition - impaired. Auditory comprehension - mildly impaired. Reading - intact. Writing - intact. Memory: Encoding of 3 words was intact (3/3); short- and delayed verbal recall – mildly impaired (2/3,  improving to 3/3 with cueing). LTM was moderately impaired for US presidents (1/4, improving to 4/4 with saturated cueing). Visual memory – impaired. Visuospatial: Visuomotor integration – impaired for copy of a 3D figure, distortion and left visual inattention were noted. Executive Functions: Motor planning - mildly impaired. Organizational skills - moderately impaired. Verbal problem solving – mildly impaired. Emotional functioning: Affect - mildly constricted but responsive to humor during session. Mood - euthymic ("okay"); Pt reported experiencing anxiety, worry and homesickness. On mood measures she additionally reported feelings of isolation.  Thought processes were goal-directed.  No abnormal thought contents were observed.  Pt denied any AH/VH. Pt also denied SI/HI/I/P. Insight - poor. Judgment - fair.

## 2020-07-09 NOTE — CONSULT NOTE ADULT - SUBJECTIVE AND OBJECTIVE BOX
Patient is a 86y old  Female who presents with a chief complaint of s/p right PCA CVA with deficits (09 Jul 2020 08:19)      HPI:  83 yo female with PMH of multiple CVAs, carotid endarterectomy, dementia, seizures (multiple GTCs in 2012, was on keppra but taken off all AEDs), CAD s/p CABG, admitted to [facility] ED on 6/30 as transfer from [facility] ?seizures. As per family, patient has been confused and with visual hallucinations with staring spells days prior. Brought to ED after family noted left sided shaking. CTH 6/30 showed chronic infarcts. Given Ativan and transferred to [facility] for cEEG monitoring. MRI at [facility] showed Acute right occipital PCA infarct. Also, multiple chronic cerebral infarcts bilaterally. Chronic right cerebellar infarcts. 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 No further seizures after 7/6 reported. Discharged on Depakote and Vimpat. Additionally, hospital course complicated by EColi UTI. Iv rocephin x 5 days completed. Evaluated by PM&R. Pt is neurologically stable for discharge to rehab on 7/8. Of note, attempted to wean off of supplemental O2 (awake O2 sat is > 95, asleep Sat dropped to 90% without signs of apnea),  should tolerate weaning supplemental oxygen per d/c plan. (08 Jul 2020 13:19).    Patient was seen and examined at bedside this morning. She was confused, but denied any complaints.      PAST MEDICAL & SURGICAL HISTORY:  Dementia  Breast cancer  Peripheral vision loss  CAD (coronary artery disease)  HLD (hyperlipidemia)  Seizure  TIA (transient ischemic attack)  H/O lumpectomy: Left breast  S/P tonsillectomy  S/P carotid endarterectomy: right, 2006  S/P CABG x 5: 2006      Father: - at age - with history of   Mother: - at age - with history of           Substance Use (street drugs): (  ) never used  (  ) other:  Tobacco Usage:  (   ) never smoked   (   ) former smoker   (   ) current smoker  (     ) pack year  Alcohol Usage:  Sexual History:   Recent Travel:      Allergies    lidocaine (Other)    Intolerances        atorvastatin 40 milliGRAM(s) Oral at bedtime  diVALproex Sprinkle 500 milliGRAM(s) Oral <User Schedule>  escitalopram 5 milliGRAM(s) Oral daily      REVIEW OF SYSTEMS:  CONSTITUTIONAL: No fever, weight loss, or fatigue  EYES: No eye pain, visual disturbances, or discharge  ENMT:  No difficulty hearing, tinnitus, vertigo; No sinus or throat pain  NECK: No pain or stiffness  BREASTS: No pain, masses, or nipple discharge  RESPIRATORY: No cough, wheezing, chills or hemoptysis; No shortness of breath  CARDIOVASCULAR: No chest pain, palpitations, dizziness, or leg swelling  GASTROINTESTINAL: No abdominal or epigastric pain. No nausea, vomiting, or hematemesis; No diarrhea or constipation. No melena or hematochezia.  GENITOURINARY: No dysuria, frequency, hematuria, or incontinence  NEUROLOGICAL: No headaches, memory loss, loss of strength, numbness, or tremors  SKIN: No itching, burning, rashes, or lesions   LYMPH NODES: No enlarged glands  ENDOCRINE: No heat or cold intolerance; No hair loss  MUSCULOSKELETAL: No joint pain or swelling; No muscle, back, or extremity pain  PSYCHIATRIC: No depression, anxiety, mood swings, or difficulty sleeping  HEME/LYMPH: No easy bruising, or bleeding gums  ALLERY AND IMMUNOLOGIC: No hives or eczema    ALL ROS REVIEWED AND NORMAL EXCEPT AS STATED ABOVE    T(C): 36.8 (07-09-20 @ 08:57), Max: 36.9 (07-08-20 @ 20:15)  HR: 77 (07-09-20 @ 08:57) (77 - 92)  BP: 132/84 (07-09-20 @ 08:57) (116/70 - 152/85)  RR: 14 (07-09-20 @ 08:57) (14 - 15)  SpO2: 99% (07-09-20 @ 08:57) (96% - 99%)  Wt(kg): --Vital Signs Last 24 Hrs  T(C): 36.8 (09 Jul 2020 08:57), Max: 36.9 (08 Jul 2020 20:15)  T(F): 98.2 (09 Jul 2020 08:57), Max: 98.4 (08 Jul 2020 20:15)  HR: 77 (09 Jul 2020 08:57) (77 - 92)  BP: 132/84 (09 Jul 2020 08:57) (116/70 - 152/85)  BP(mean): --  RR: 14 (09 Jul 2020 08:57) (14 - 15)  SpO2: 99% (09 Jul 2020 08:57) (96% - 99%)    PHYSICAL EXAM:  GENERAL: NAD, well-groomed, well-developed, confused  HEAD:  Atraumatic, Normocephalic  EYES: EOMI, PERRLA, conjunctiva and sclera clear  ENMT: No tonsillar erythema, exudates, or enlargement; Moist mucous membranes, Good dentition, No lesions  NECK: Supple, No JVD, Normal thyroid  NERVOUS SYSTEM:  Alert & Oriented X 2  CHEST/LUNG: Clear to percussion bilaterally; No rales, rhonchi, wheezing, or rubs  HEART: Regular rate and rhythm; No murmurs, rubs, or gallops  ABDOMEN: Soft, Nontender, Nondistended; Bowel sounds present  EXTREMITIES:  2+ Peripheral Pulses, No clubbing, cyanosis, or edema  LYMPH: No lymphadenopathy noted  SKIN: No rashes or lesions                          12.7   4.49  )-----------( 157      ( 09 Jul 2020 07:40 )             39.0     07-09    141  |  104  |  21  ----------------------------<  102<H>  4.1   |  29  |  0.81    Ca    9.0      09 Jul 2020 07:40    TPro  6.8  /  Alb  2.9<L>  /  TBili  0.5  /  DBili  x   /  AST  38  /  ALT  46<H>  /  AlkPhos  87  07-09         CAPILLARY BLOOD GLUCOSE                RADIOLOGY & ADDITIONAL TESTS:    Consultant(s) Notes Reviewed:  [x ] YES  [ ] NO  Care Discussed with Consultants/Other Providers [ x] YES  [ ] NO  Imaging Personally Reviewed:  [ ] YES  [ ] NO

## 2020-07-09 NOTE — CHART NOTE - NSCHARTNOTEFT_GEN_A_CORE
REHABILITATION DIAGNOSIS/IMPAIRMENT GROUP CODE:  s/p right PCA CVA with left sided visual loss and functional deficits    COMORBIDITIES/COMPLICATING CONDITIONS IMPACTING REHABILITATION:  HEALTH ISSUES - PROBLEM Dx: dementia, constipation, cad, hld, seizures        PAST MEDICAL & SURGICAL HISTORY:  Dementia  Breast cancer  Peripheral vision loss  CAD (coronary artery disease)  HLD (hyperlipidemia)  Seizure  TIA (transient ischemic attack)  H/O lumpectomy: Left breast  S/P tonsillectomy  S/P carotid endarterectomy: right, 2006  S/P CABG x 5: 2006      Based upon consideration of the patient's impairments, functional status, complicating conditions and any other contributing factors and after information garnered from the assessments of all therapy disciplines involved in treating the patient and other pertinent clinicians:    INTERDISCIPLINARY REHABILITATION INTERVENTIONS:    [ x  ] Transfer Training  [ x  ] Bed Mobility  [ x  ] Therapeutic Exercise  [ x  ] Balance/Coordination Exercises  [ x  ] Locomotion retraining  [ x  ] Stairs  [ x  ] Functional Transfer Training  [ x  ] Bowel/Bladder program  [ x  ] Pain Management  [ x  ] Skin/Wound Care  [ x  ] Visual/Perceptual Training  [ x  ] Therapeutic Recreation Activities  [ x  ] Neuromuscular Re-education  [ x  ] Activities of Daily Living  [ x  ] Speech Exercise  [ x  ] Swallowing Exercises  [ x  ] Vital Stim  [ x  ] Dietary Supplements  [ x  ] Calorie Count  [ x  ] Cognitive Exercises  [  x ] Congnitive/Linguistic Treatment  [ x  ] Behavior Program  [ x  ] Neuropsych Therapy  [ x  ] Patient/Family Counseling  [ x  ] Family Training  [ x  ] Community Re-entry  [ x  ] Orthotic Evaluation  [   ] Prosthetic Eval/Training    MEDICAL PROGNOSIS:  good    REHAB POTENTIAL:  excellent    EXPECTED DAILY THERAPY:         PT: 1h       OT: 1h       ST: 1h       S&O: eval    EXPECTED INTENSITY OF PROGRAM:  3h daily    EXPECTED FREQUENCY OF PROGRAM:  daily    ESTIMATED LOS:  10d    ESTIMATED DISPOSITION:  home    INTERDISCIPLINARY FUNCTIONAL OUTCOMES?GOALS:         Gait/Mobility:       Transfers:       ADLs:       Functional Transfers:       Medication Management:       Communication:       Cognitive:       Dysphagia:       Bladder       Bowel:

## 2020-07-09 NOTE — CONSULT NOTE ADULT - ASSESSMENT
SARA FARAH is a 87yo Female with hx multiple b/l CVas, now s/p acute right PCA CVA and decreased functional mobility, gait instability and ADL impairments.    Acute Right PCA CVA  -minimal focal deficits on exam  -start PT/OT/SLP  -C/w ASA and atorvastatin  -fall and aspiration precautions  -puree diet    Seizure disorder  -EEG showed focal status epilepticus with eventual cessation  -c/w depakote and Vimpat   -seizure precautions  -neurology follow up outpt    Essential HTN  -C/w metoprolol  -Will adjust as neccessary    CAD s/p CABG  -c/w metoprolol, ASA, statin  -may benefit from ACE-I if BP tolerates    Dementia  -Nemenda BID  -neuropsychological eval    DVT prophylaxis  -Lovenox

## 2020-07-09 NOTE — PROGRESS NOTE ADULT - ASSESSMENT
SARA FARAH is a 85yo Female with hx multiple b/l CVas, now s/p acute right PCA CVA and decreased functional mobility, gait instability and ADL impairments.    #CVA  -ASA/Lipitor continues.  -start PT/OT/SLP  -fall and aspiration precautions  -puree diet advanced to [facility] soft with thins. Monitor intake. Nutrition eval for poor appetite.     #Seizures  -depakote and Vimpat continues  -seizure precautions  -neurology follow up outpt  -no seizures reported overnight      #HTN  -Toprol  -monitor VS closely  -medicine follow up    #Dementia  -Nemenda BID  -neuropsychological eval    #UTI  -completed antibiotics x5d course for EColi UTI  -check PVR    #Hx O2 desaturation  -O2 prn via NC  -keep O2>91%  -monitor sats off supplemental O2

## 2020-07-09 NOTE — DIETITIAN INITIAL EVALUATION ADULT. - PHYSICAL APPEARANCE
other (specify)/NFPE: mild/moderate muscle wasting (temporal, clavicle) and fat loss (orbital, buccal) Height: 5'3", Weight: (7/8) 145.5lbs, BMI: 25.9  IBW: 115lbs +/- 10%

## 2020-07-09 NOTE — CHART NOTE - NSCHARTNOTEFT_GEN_A_CORE
Upon Nutritional Assessment by the Registered Dietitian your patient was determined to meet criteria / has evidence of the following diagnosis/diagnoses:          [X]  Mild Protein Calorie Malnutrition    Findings as based on:  [X] Comprehensive nutrition assessment   [X] Nutrition Focused Physical Exam: mild/moderate muscle wasting as documented in initial assessment   [X] Other: poor appetite/intake      Nutrition Plan/Recommendations:    1. Add Ensure Enlive BID  2. 1:1 feeding assistance for encouragement with meals  3. Diet consistency per speech therapy       PROVIDER Section:     By signing this assessment you are acknowledging and agree with the diagnosis/diagnoses assigned by the Registered Dietitian    Comments:

## 2020-07-09 NOTE — DIETITIAN INITIAL EVALUATION ADULT. - ADD RECOMMEND
Provide 1:1 feeding assistance at meals with encouragement. Suggest adding MVI po daily. Trend weights, labs & PO intake.

## 2020-07-09 NOTE — DIETITIAN INITIAL EVALUATION ADULT. - OTHER INFO
Pt is a 87yo Female with hx multiple b/l CVas, now s/p acute right PCA CVA and decreased functional mobility, gait instability and ADL impairments.  Pt with poor intake this am per RN. Diet upgraded to mechanical soft c thin liquids per speech therapy. PO intake somewhat improved for lunch, although pt requires 1:1 feeding assistance with encouragement per RN. Pt agreeable to adding Ensure Enlive supplements BID (strawberry or chocolate). Pt denies any recent weight changes. Reports UBW 120lbs, compared to current weight (7/8) 145.5lbs ? Pt states that she was tolerating a regular diet PTA. She lives with her  in her daughter's home; daughter prepares all meals. States that she eats a variety of foods; denies food allergies/intolerances. Food preferences obtained from pt. Encouraged increased PO intake/oral hydration. Pt denies GI distress- last BM 7/7. No skin breakdown or edema. Recommend adding MVI + Ensure Enlive 8oz BID.

## 2020-07-10 NOTE — PROGRESS NOTE ADULT - ASSESSMENT
SARA FARAH is a 85yo Female with hx multiple b/l CVas, now s/p acute right PCA CVA and decreased functional mobility, gait instability and ADL impairments.    Acute Right PCA CVA  -C/w PT/OT/SLP  -C/w ASA and atorvastatin  -fall and aspiration precautions  -puree diet    Seizure disorder  -EEG showed focal status epilepticus with eventual cessation  -Patient appears more lethargic today  -c/w depakote and Vimpat  -seizure precautions  -neurology follow up outpt    Essential HTN  -/74 - 149/84  -C/w metoprolol 50 mg daily  -Will adjust as neccessary    CAD s/p 5 vessel CABG  -[facility] 2019: Patent 3V CABG. Free LIMA (D1-LAD). SVG-OM, SVG-RCA.  -c/w metoprolol, ASA, statin  -may benefit from ACE-I if BP tolerates    Dementia  -Nemenda BID  -neuropsychological eval    DVT prophylaxis  -Lovenox

## 2020-07-10 NOTE — PROGRESS NOTE ADULT - ASSESSMENT
SARA FARAH is a 85yo Female with hx multiple b/l CVas, now s/p acute right PCA CVA and decreased functional mobility, gait instability and ADL impairments.    #CVA  -ASA/Lipitor continues.  -on PT/OT/SLP. Observed in PT, stooped posture with shuffling gait/(?parkinsonian feature)  -fall and aspiration precautions  -puree diet advanced to [facility] soft with thins. Monitor intake. Nutrition eval for poor appetite. Needs assistance with meals.     #Seizures  -depakote and Vimpat continues  -seizure precautions  -neurology follow up outpt  -no seizures reported overnight      #HTN  -Toprol  -monitor VS closely  -medicine in    #Dementia  -Nemenda BID  -neuropsychological eval completed    #UTI  -completed antibiotics x5d course for EColi UTI  -check PVR today    #Hx O2 desaturation  -O2 prn via NC  -keep O2>91%  -monitor sats off supplemental O2, sats well>90% RA in therapy.

## 2020-07-11 NOTE — PROGRESS NOTE ADULT - ASSESSMENT
SARA FARAH is a 87yo Female with hx multiple b/l CVas, now s/p acute right PCA CVA and decreased functional mobility, gait instability and ADL impairments.    #CVA  -ASA/Lipitor continues.  -Continue PT/OT/SLP.  -fall and aspiration precautions  -puree diet advanced to [facility] soft with thins. Monitor intake. Nutrition eval for poor appetite.     #Seizures  -depakote and Vimpat continues  -seizure precautions  -neurology follow up outpt  -no seizures reported overnight      #HTN  -Toprol  -monitor VS closely  -medicine following    #Dementia  -Nemenda BID  -neuropsychological eval completed    #UTI  -completed antibiotics x5d course for EColi UTI    #Hx O2 desaturation  -O2 prn via NC  -keep O2>91%  -monitor sats off supplemental O2, sats well>90% RA in therapy.     DVT PPX - lovenox

## 2020-07-12 NOTE — PROGRESS NOTE ADULT - ASSESSMENT
Pt is 87yo F admitted to acute rehab for CVA.     *CVA  cont acute rehab   PT/OT/SLP   Advance to mechanical soft diet with thins     *Seizure   Depakote and Vimpat   Seizure precaution   Neurology follow up     *HTN   BP controlled   cont toprol     *Dementia   cont Namenda BID   Neuropsychology    *UTI  s/p abx treatment   resolved     *Hx of O2 desat   NC O2 prn     *DVT ppx   Lovenox

## 2020-07-12 NOTE — PROGRESS NOTE ADULT - ASSESSMENT
SARA FARAH is a 85yo Female with hx multiple b/l CVas, now s/p acute right PCA CVA and decreased functional mobility, gait instability and ADL impairments.    #CVA  -ASA/Lipitor continues.  -Continue PT/OT/SLP.  -fall and aspiration precautions  -puree diet advanced to [facility] soft with thins. Monitor intake. Nutrition eval for poor appetite.     #Seizures  -depakote and Vimpat continues  -seizure precautions  -neurology follow up outpt  -no seizures reported overnight      #HTN  -Toprol  -monitor VS closely  -medicine following    #Dementia  -Nemenda BID  -neuropsychological eval completed    #UTI  -completed antibiotics x5d course for EColi UTI    #Hx O2 desaturation  -O2 prn via NC  -keep O2>91%  -monitor sats off supplemental O2, sats well>90% RA in therapy.     DVT PPX - lovenox

## 2020-07-13 NOTE — CHART NOTE - NSCHARTNOTEFT_GEN_A_CORE
NUTRITION FOLLOW UP    SOURCE: Patient [X)   Family [ ]    Medical Record (X)    DIET:    PATIENT REPORT: Per PCA, patient taking mostly spoons of meals since working with patient. Patient reports to writer that she has no appetite and doesn't like her food. She wants cold cereal and other foods that are not accepted on her diet. Does like grilled cheese, pudding and cocacola.   Spoke with MD for possible suggestion of an appetite stimulant. Weights have been stable and UBW ~120-130# as reported by family. At home, patient typically consumes 4-6 small meals and at times her daughter will add high-calorie items to foods (ie cream soup, Ensure etc) at home. Patient not drinking Ensure here, so will d/c and add a Magic Cup BID to try.     PO INTAKE:  Per PCA intakes vary. Has not been eating more than 25% of meals for the last day.     CURRENT WEIGHT: Weight (kg): 146# (7/12)  (7/8) 145.5#    PERTINENT MEDS:   Pertinent Medications: MEDICATIONS  (STANDING):  aspirin enteric coated 81 milliGRAM(s) Oral daily  atorvastatin 40 milliGRAM(s) Oral at bedtime  calcium carbonate 1250 mG  + Vitamin D (OsCal 500 + D) 1 Tablet(s) Oral daily  diVALproex Sprinkle 500 milliGRAM(s) Oral <User Schedule>  enoxaparin Injectable 40 milliGRAM(s) SubCutaneous daily  escitalopram 5 milliGRAM(s) Oral daily  lacosamide 100 milliGRAM(s) Oral two times a day  lisinopril 5 milliGRAM(s) Oral daily  memantine 5 milliGRAM(s) Oral two times a day  metoprolol succinate ER 50 milliGRAM(s) Oral daily      PERTINENT LABS:   07-09 Alb 2.9 g/dL<L> 07-01 Chol 115 mg/dL LDL 53 mg/dL HDL 47 mg/dL<L> Trig 75 mg/dL    SKIN: Intact    EDEMA: N/A  LAST BM: 7/11    ESTIMATED NEEDS:   [X] no change since previous assessment    PREVIOUS NUTRITION DIAGNOSIS:  Malnutrition Mild, chronic.     Etiology related to decreased appetite in setting of dementia, s/p CVA.     Signs/Symptoms as evidenced by mild/severe muscle wasting/fat loss per NFPE.     Goal/Expected Outcome Pt will consume >75% of most meals/supplements throughout rehab course.      NUTRITION DIAGNOSIS is :  ( x )  Ongoing    NUTRITION RECOMMENDATIONS:   1. D/c Ensure and add Magic Cup BID.  2. Recommend continuing assistance and encouragement with meals.   3. Recommend a daily MVI with  minerals.   4. Appetite stimulant if medically feasible.     MONITORING AND EVALUATION:   1. Tolerance to diet prescription   2. PO intake  3. Weights  4. Labs  5. Follow Up per protocol     RD to remain available   Jessica Baltazar RDN.

## 2020-07-13 NOTE — PROGRESS NOTE ADULT - ASSESSMENT
SARA FARAH is a 85yo Female with hx multiple b/l CVas, now s/p acute right PCA CVA and decreased functional mobility, gait instability and ADL impairments.    #CVA  -ASA/Lipitor  - PT/OT/SLP.  - fall and aspiration precautions  -puree diet advanced to [facility] soft with thins. Monitor intake. Nutrition eval for poor appetite. Needs assistance with meals.     #Seizures  -depakote and Vimpat  -seizure precautions  -neurology follow up outpt  -no seizures reported overnight      #HTN  -Toprol  -monitor VS closely  -medicine is following     #Dementia  -Namenda   -neuropsychological eval completed    #UTI- resolved  -completed antibiotics x5d course for EColi UTI  -check PVR    Multidisciplinary team meeting today:  patient's functional goals and needs, functional and clinical  progress were discussed, barriers to discharge were identified. Anticipate discharge home with home care, 24/7 supervision for safety.  ELOS 2 weeks

## 2020-07-13 NOTE — PROGRESS NOTE ADULT - ASSESSMENT
SARA FARAH is a 85yo Female with hx multiple b/l CVas, now s/p acute right PCA CVA and decreased functional mobility, gait instability and ADL impairments.    Acute Right PCA CVA  -C/w PT/OT/SLP  -C/w ASA and atorvastatin  -fall and aspiration precautions  -puree diet    Seizure disorder  -EEG showed focal status epilepticus with eventual cessation  -Awake and alert on exam  -c/w depakote and Vimpat  -seizure precautions  -neurology follow up outpt    Essential HTN  -BP above goal  -Start ACE-I, lisinopril 5 mg daily. Monitor for adverse effects  -C/w metoprolol 50 mg daily  -Will adjust as neccessary    CAD s/p 5 vessel CABG  -[facility] 2019: Patent 3V CABG. Free LIMA (D1-LAD). SVG-OM, SVG-RCA.  -c/w metoprolol, ASA, statin  -start lisinopril as above    Dementia  -Nemenda BID  -neuropsychological eval    DVT prophylaxis  -Lovenox

## 2020-07-14 NOTE — PROGRESS NOTE ADULT - ASSESSMENT
SARA FARAH is a 85yo Female with hx multiple b/l CVas, now s/p acute right PCA CVA and decreased functional mobility, gait instability and ADL impairments.    #CVA  -ASA/Lipitor  - PT/OT/SLP.  - fall and aspiration precautions  -puree diet advanced to [facility] soft with thins. Monitor intake.   - will add Melatonin for insomnia     #Seizures  -Depakote and Vimpat  -seizure precautions  -neurology follow up outpatient   -no seizures reported overnight      #HTN  -Toprol  -monitor VS closely  -medicine is following, Lisinopril added, case discussed      #Dementia  -Namenda   -neuropsychological eval completed    #UTI- resolved  -completed antibiotics x5d course for EColi UTI  -check PVR    Multidisciplinary team meeting7/13/20:  patient's functional goals and needs, functional and clinical  progress were discussed, barriers to discharge were identified. Anticipate discharge home with home care, 24/7 supervision for safety.  ELOS 2 weeks    Discharge plan discussed with team, RADHA

## 2020-07-14 NOTE — PROGRESS NOTE ADULT - ASSESSMENT
SARA FARAH is a 87yo Female with hx multiple b/l CVas, now s/p acute right PCA CVA and decreased functional mobility, gait instability and ADL impairments.    Acute Right PCA CVA  -C/w PT/OT/SLP  -C/w ASA and atorvastatin  -fall and aspiration precautions  -puree diet    Seizure disorder  -EEG showed focal status epilepticus with eventual cessation  -Awake and alert on exam  -c/w depakote and Vimpat  -seizure precautions  -neurology follow up outpt    Essential HTN  -Hypotensive this morning  -C/w metoprolol 50 mg daily  -Decrease lisinopril (started 7/13) to 2.5 mg daily, hold for SBP<140  -Will adjust as neccessary    CAD s/p 5 vessel CABG  -[facility] 2019: Patent 3V CABG. Free LIMA (D1-LAD). SVG-OM, SVG-RCA.  -c/w metoprolol, ASA, statin  -c/w lisinopril as above    Dementia  -Nemenda BID  -neuropsychological eval    DVT prophylaxis  -Lovenox

## 2020-07-15 NOTE — PROGRESS NOTE ADULT - ASSESSMENT
SARA FARAH is a 87yo Female with hx multiple b/l CVas, now s/p acute right PCA CVA and decreased functional mobility, gait instability and ADL impairments.    Acute Right PCA CVA  -C/w PT/OT/SLP  -C/w ASA and atorvastatin  -fall and aspiration precautions  -puree diet  -no focal deficits    Seizure disorder  -EEG showed focal status epilepticus with eventual cessation  -Awake and alert on exam  -c/w depakote and Vimpat  -seizure precautions  -neurology follow up outpt    Essential HTN  Hypotension  -C/w metoprolol 50 mg daily  -Decreased lisinopril (started 7/13) to 2.5 mg daily, hold for SBP<140  -Normotensive toay    CAD s/p 5 vessel CABG  -[facility] 2019: Patent 3V CABG. Free LIMA (D1-LAD). SVG-OM, SVG-RCA.  -c/w metoprolol, ASA, statin  -c/w lisinopril as above    Dementia  -Nemenda BID  -neuropsychological eval    DVT prophylaxis  -Lovenox

## 2020-07-15 NOTE — PROGRESS NOTE ADULT - ASSESSMENT
SARA FARAH is a 87yo Female with hx multiple b/l CVas, now s/p acute right PCA CVA and decreased functional mobility, gait instability and ADL impairments.    #CVA  -ASA/Lipitor  - PT/OT/SLP.  - fall and aspiration precautions  -puree diet advanced to [facility] soft with thins. Monitor intake.   - will add Melatonin for insomnia     #Seizures  -Depakote and Vimpat  -seizure precautions  -neurology follow up outpatient   -no seizures reported overnight      #HTN  -Toprol  -monitor VS closely  -medicine is following, Lisinopril added, case discussed      #Dementia  -Namenda   -neuropsychological eval completed    #UTI- resolved  -completed antibiotics x5d course for EColi UTI  -check PVR    Multidisciplinary team meeting7/13/20:  patient's functional goals and needs, functional and clinical  progress were discussed, barriers to discharge were identified. Anticipate discharge home with home care, 24/7 supervision for safety.  ELOS 2 weeks    Discharge plan discussed with team, RADHA

## 2020-07-16 NOTE — PROGRESS NOTE ADULT - ASSESSMENT
SARA FARAH is a 85yo Female with hx multiple b/l CVas, now s/p acute right PCA CVA and decreased functional mobility, gait instability and ADL impairments.    Acute Right PCA CVA  -C/w PT/OT/SLP  -C/w ASA and atorvastatin  -fall and aspiration precautions  -puree diet  -no focal deficits    Seizure disorder  -EEG showed focal status epilepticus with eventual cessation  -Awake and alert on exam  -c/w depakote and Vimpat  -seizure precautions  -neurology follow up outpt    Gait disturbance  -Patient has shuffling gait and slow turning. No muscle rigidity or bradykinesia to suggest PD  -Recommend neurology followup as outpatient    Essential HTN  Hypotension  -C/w metoprolol 50 mg daily  -Decreased lisinopril (started 7/13) to 2.5 mg daily, hold for SBP<140  -Normotensive toay    CAD s/p 5 vessel CABG  -[facility] 2019: Patent 3V CABG. Free LIMA (D1-LAD). SVG-OM, SVG-RCA.  -c/w metoprolol, ASA, statin  -c/w lisinopril as above    Dementia  -Nemenda BID  -neuropsychological eval    DVT prophylaxis  -Lovenox

## 2020-07-16 NOTE — PROGRESS NOTE ADULT - ASSESSMENT
SARA FARAH is a 85yo Female with hx multiple b/l CVas, now s/p acute right PCA CVA and decreased functional mobility, gait instability and ADL impairments.    #CVA  -ASA/Lipitor  - PT/OT/SLP.  - fall and aspiration precautions  -puree diet advanced to [facility] soft with thins. Monitor intake.   - will add Melatonin for insomnia     #Seizures  -Depakote and Vimpat  -seizure precautions  -neurology follow up outpatient   -no seizures reported overnight      #HTN  -Toprol  -monitor VS closely  -medicine is following, Lisinopril added, case discussed      #Dementia  -Namenda   -neuropsychological eval completed    #UTI- resolved  -completed antibiotics x5d course for EColi UTI  -check PVR    Multidisciplinary team meeting7/13/20:  patient's functional goals and needs, functional and clinical  progress were discussed, barriers to discharge were identified. Anticipate discharge home with home care, 24/7 supervision for safety.  ELOS 2 weeks    Discharge plan discussed with team, RADHA

## 2020-07-17 NOTE — PROGRESS NOTE ADULT - ASSESSMENT
SARA FARAH is a 85yo Female with hx multiple b/l CVas, now s/p acute right PCA CVA and decreased functional mobility, gait instability and ADL impairments.    #CVA  -ASA/Lipitor  - PT/OT/SLP.  - fall and aspiration precautions  - regular dist ( discussed with ST)   -  Melatonin for insomnia     #Seizures  -Depakote and Vimpat  -seizure precautions  -neurology follow up outpatient   -no seizures reported      #HTN  -Toprol  -monitor VS closely  -medicine is following, Lisinopril added, case discussed      #Dementia  -Namenda   -neuropsychological eval completed    #UTI- resolved  -completed antibiotics x5d course for EColi UTI  -check PVR    Multidisciplinary team meeting7/13/20:  patient's functional goals and needs, functional and clinical  progress were discussed, barriers to discharge were identified. Anticipate discharge home with home care, 24/7 supervision for safety.  ELOS 2 weeks    Discharge plan discussed with team, RADHA

## 2020-07-17 NOTE — PROGRESS NOTE ADULT - ASSESSMENT
SARA FARAH is a 85yo Female with hx multiple b/l CVas, now s/p acute right PCA CVA and decreased functional mobility, gait instability and ADL impairments.    Acute Right PCA CVA  -C/w PT/OT/SLP  -C/w ASA and atorvastatin  -fall and aspiration precautions  -puree diet  -no focal deficits      Dementia/Delerium  -Likely vascular dementia  -Waxing and waning mental status during rehab admission  -Recommend haldol as needed for episodes of agitation  Nemenda BID    Seizure disorder  -EEG showed focal status epilepticus with eventual cessation  -Awake and alert on exam  -c/w depakote and Vimpat  -seizure precautions  -neurology follow up outpt    Gait disturbance  -Patient has shuffling gait and slow turning. No muscle rigidity or bradykinesia to suggest PD  -Recommend neurology followup as outpatient    Essential HTN  Hypotension  -C/w metoprolol 50 mg daily  -Decreased lisinopril (started 7/13) to 2.5 mg daily, hold for SBP<140  -Normotensive toay    CAD s/p 5 vessel CABG  -[facility] 2019: Patent 3V CABG. Free LIMA (D1-LAD). SVG-OM, SVG-RCA.  -c/w metoprolol, ASA, statin  -c/w lisinopril as above    DVT prophylaxis  -Lovenox

## 2020-07-19 NOTE — BEHAVIORAL HEALTH ASSESSMENT NOTE - PAST PSYCHOTROPIC MEDICATION
on several medications to address neurological/ neuropsychiatric issues, including Namenda.  Behavior and history c/w delirium superimposed on Dementia.

## 2020-07-19 NOTE — BEHAVIORAL HEALTH ASSESSMENT NOTE - NSBHCONSULTRECOMMENDOTHER_PSY_A_CORE FT
VPA level = 77 continue to monitor.  Monitor CBC expect platelets to return to normal count VPA has tendency to temporarily lower them.  D/ C VPA if platelets drop below 105.    can consider increasing Namenda to max dosage for management of dementia.

## 2020-07-19 NOTE — BEHAVIORAL HEALTH ASSESSMENT NOTE - NSBHCHARTREVIEWVS_PSY_A_CORE FT
Vital Signs Last 24 Hrs  T(C): 36.4 (19 Jul 2020 08:35), Max: 36.5 (18 Jul 2020 20:41)  T(F): 97.6 (19 Jul 2020 08:35), Max: 97.7 (18 Jul 2020 20:41)  HR: 62 (19 Jul 2020 08:35) (62 - 74)  BP: 105/71 (19 Jul 2020 08:35) (105/71 - 137/78)  BP(mean): --  RR: 15 (19 Jul 2020 08:35) (14 - 15)  SpO2: 100% (19 Jul 2020 08:35) (95% - 100%)

## 2020-07-19 NOTE — BEHAVIORAL HEALTH ASSESSMENT NOTE - NSBHCHARTREVIEWLAB_PSY_A_CORE FT
07-19    141  |  105  |  26<H>  ----------------------------<  78  4.1   |  25  |  0.93    Ca    8.9      19 Jul 2020 06:08    TPro  6.2  /  Alb  3.1<L>  /  TBili  0.5  /  DBili  x   /  AST  36  /  ALT  32  /  AlkPhos  61  07-19                          12.9   4.76  )-----------( 125      ( 19 Jul 2020 06:08 )             38.5

## 2020-07-19 NOTE — BEHAVIORAL HEALTH ASSESSMENT NOTE - NSBHCHARTREVIEWINVESTIGATE_PSY_A_CORE FT
< from: 12 Lead ECG (07.01.20 @ 11:47) >    Ventricular Rate 64 BPM    Atrial Rate 64 BPM    P-R Interval 128 ms    QRS Duration 80 ms    Q-T Interval 428 ms    QTC Calculation(Bezet) 441 ms    P Axis 73 degrees    R Axis 11 degrees    T Axis 10 degrees    Diagnosis Line SINUS RHYTHM WITH SINUS ARRHYTHMIA WITH OCCASIONAL PREMATURE VENTRICULAR COMPLEXES    Confirmed by RYAN BAUM, TI UMANA ([id number]) on 7/2/2020 12:24:39 PM    < end of copied text >

## 2020-07-19 NOTE — BEHAVIORAL HEALTH ASSESSMENT NOTE - OTHER
dementia sitting in wheelchair. repeatedly calling out for daughter, then for nursing, wants to unbuckle seat belt on wheel chair. language skills relatively preserved, however pt is very confused.

## 2020-07-19 NOTE — BEHAVIORAL HEALTH ASSESSMENT NOTE - NSBHCHARTREVIEWIMAGING_PSY_A_CORE FT
< from: MR Head No Cont (06.30.20 @ 23:27) >    EXAM:  MR BRAIN                            PROCEDURE DATE:  06/30/2020            INTERPRETATION:  INDICATIONS:  Left focal seizures and visual hallucinations. Rule out stroke.    TECHNIQUE:  Multiplanar imaging was performed using T1 weighted, T2 weighted and FLAIR sequences.  Diffusion weighted and susceptibility sensitive images were also obtained.      COMPARISON EXAMINATION:  CT scans 6/30/2020 and 12/5/2016 and MR scan 12/18/2012      FINDINGS: The study is limited by motion artifact.  VENTRICLES AND SULCI:  Ex vacuo dilatation of the right occipital horn is seen. Ventricles and sulci are overall prominent in size compatible with mild to moderate cerebral volume loss.  INTRA-AXIAL:  There is an area of diffusion restriction within the right occipital lobe demonstrating T1/T2 prolongation and mild occipital horn effacement compatible with an acute PCA distribution infarct. An adjacent area of chronic infarction/hemorrhage with susceptibility is seen more medially. Chronic areas of infarction are seen within the bilateral frontal lobes and left parietal lobe with hemosiderin deposition within the right frontal lobe infarct. Scattered right cerebellar chronic infarcts are seen. No midline shift is seen. Scattered areas of white matter T2 hyperintensity are compatible with microvascular-type changes with a lacune within the left centrum semiovale/external capsule.  EXTRA-AXIAL:  No mass or collection.  VISUALIZED SINUSES:  Normal.  VISUALIZED MASTOIDS:  Mild abnormal soft tissue  CALVARIUM:  Normal.  CAROTID FLOW VOIDS:  Present.  MISCELLANEOUS:  None.      IMPRESSION:    Motion limited exam.    Acute right occipital lobe PCA distribution infarct.    Multiple chronic cerebral infarcts as described above with hemosiderin deposition within the right frontal and right occipital regions.    Chronic right cerebellar infarcts.    Findings were discussed with SAYDA Carreon on 7/1/2020 8:27 AM with read back.          < end of copied text >

## 2020-07-19 NOTE — BEHAVIORAL HEALTH ASSESSMENT NOTE - SUMMARY
87yo Female with hx multiple b/l CVas, now s/p acute right PCA CVA and decreased functional mobility, gait instability and ADL impairments.    Psychiatry called to evaluate dementia / sundowning behavior.

## 2020-07-19 NOTE — BEHAVIORAL HEALTH ASSESSMENT NOTE - NSBHCONSULTMEDS_PSY_A_CORE FT
MEDICATIONS  (STANDING):  aspirin enteric coated 81 milliGRAM(s) Oral daily  atorvastatin 40 milliGRAM(s) Oral at bedtime  calcium carbonate 1250 mG  + Vitamin D (OsCal 500 + D) 1 Tablet(s) Oral daily  diVALproex Sprinkle 500 milliGRAM(s) Oral <User Schedule>  enoxaparin Injectable 40 milliGRAM(s) SubCutaneous daily  escitalopram 5 milliGRAM(s) Oral daily  lacosamide 100 milliGRAM(s) Oral two times a day  melatonin 3 milliGRAM(s) Oral at bedtime  memantine 5 milliGRAM(s) Oral two times a day  metoprolol succinate ER 50 milliGRAM(s) Oral daily

## 2020-07-19 NOTE — BEHAVIORAL HEALTH ASSESSMENT NOTE - RISK ASSESSMENT
Low Acute Suicide Risk pt unable to carry out complex planning. However is risk for agitation, falling, poor safety awareness due to cognitive deficits and will require 24/7 supervision.

## 2020-07-19 NOTE — BEHAVIORAL HEALTH ASSESSMENT NOTE - HPI (INCLUDE ILLNESS QUALITY, SEVERITY, DURATION, TIMING, CONTEXT, MODIFYING FACTORS, ASSOCIATED SIGNS AND SYMPTOMS)
HPI:  83 yo female with PMH of multiple strokes of unknown etiology ( ILR negative follows with Dr. Lawrence), carotid endarterectomy, dementia, seizures (multiple GTCs in 2012, was on keppra but taken off all AEDs), CAD, CABG on ASA 81 admitted to [facility] ED on 6/30 as transfer from [facility] ?seizures. As per family, patient has been confused and with visual hallucinations with staring spells days prior. Brought to ED after family noted left sided shaking. CTH 6/30 showed chronic infarcts. Given Ativan and transferred to [facility] for cEEG monitoring. MRI at [facility] showed Acute right occipital PCA infarct. Also, multiple chronic cerebral infarcts bilaterally. Chronic right cerebellar infarcts.  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 No further seizures after 7/6 reported. Discharged on Depakote and Vimpat. Additionally, hospital course complicated by EColi UTI. Iv rocephin x 5 days completed. Evaluated by PM&R. Pt is neurologically stable for discharge to rehab on 7/8.    Of note, attempted to wean off of supplemental O2 (awake O2 sat is > 95, asleep Sat dropped to 90% without signs of apnea),  should tolerate weaning supplemental oxygen per d/c plan. (08 Jul 2020 13:19)    Pt unable to provide history, pt noted to have calling out behavior today, some disinhibition as noted by disrobing.   Pt calling out for her daughter, forgets that she is coming for visiting, mistakes a male patient  next to her as her spouse and calls him by her spouse's name.   Pt was on Celexa, and medication switched to Lexapro. Agree with same, pt got prn Seroquel and slept better, no adverse side effects seen.   Of note platelets are decreased, pt on both Vimpat and Depakote.

## 2020-07-20 NOTE — PROGRESS NOTE ADULT - ATTENDING COMMENTS
No new complaints  Unchanged exam  Psychiatry input appreciated, will follow recommendations.  Multidisciplinary team meeting today:  patient's functional goals and needs, functional and clinical  progress were discussed, barriers to discharge were identified. Anticipate discharge home with 24/7 care and supervision for safety , family is in agreement.     EDO7/27/20

## 2020-07-20 NOTE — PROGRESS NOTE ADULT - ASSESSMENT
SARA FARAH is a 87yo Female with hx multiple b/l CVas, now s/p acute right PCA CVA and decreased functional mobility, gait instability and ADL impairments.    #CVA  -ASA/Lipitor  - PT/OT/SLP.  - fall and aspiration precautions  - regular diet  -  Melatonin for insomnia, sundowns    #Seizures  -Depakote and Vimpat  -seizure precautions  -neurology follow up outpatient   -no seizures reported at rehab      #HTN  -Toprol  -monitor VS closely  -medicine is following    #Dementia  -Namenda   -neuropsychological eval completed    #UTI- resolved  -completed antibiotics x5d course for EColi UTI  -low PVR

## 2020-07-20 NOTE — CHART NOTE - NSCHARTNOTEFT_GEN_A_CORE
NUTRITION FOLLOW UP    SOURCE: Patient [X)  Medical Record (X)    DIET: Regular/General    PATIENT REPORT: observed eating, still with fair appetite and benefits from assistance at meals. Likes food that family brings in. Weight is stable and presents with no new nutritional concerns. Tolerating a General/Regular diet well.     PO INTAKE: 50-75%. Doesn't like the food here. Family brings in food at times.            CURRENT WEIGHT:  147# (7/19)  145# (7/8)    PERTINENT MEDS:   Pertinent Medications: MEDICATIONS  (STANDING):  aspirin enteric coated 81 milliGRAM(s) Oral daily  atorvastatin 40 milliGRAM(s) Oral at bedtime  calcium carbonate 1250 mG  + Vitamin D (OsCal 500 + D) 1 Tablet(s) Oral daily  diVALproex Sprinkle 500 milliGRAM(s) Oral <User Schedule>  enoxaparin Injectable 40 milliGRAM(s) SubCutaneous daily  escitalopram 5 milliGRAM(s) Oral daily  lacosamide 100 milliGRAM(s) Oral two times a day  melatonin 3 milliGRAM(s) Oral at bedtime  memantine 5 milliGRAM(s) Oral two times a day  metoprolol succinate ER 50 milliGRAM(s) Oral daily    MEDICATIONS  (PRN):      PERTINENT LABS:  07-19 Na141 mmol/L Glu 78 mg/dL K+ 4.1 mmol/L Cr  0.93 mg/dL BUN 26 mg/dL<H> 07-19 Alb 3.1 g/dL<L> 07-01 Chol 115 mg/dL LDL 53 mg/dL HDL 47 mg/dL<L> Trig 75 mg/dL    SKIN:  intact  EDEMA: none  LAST BM: 7/18    ESTIMATED NEEDS:   [X] no change since previous assessment    PREVIOUS NUTRITION DIAGNOSIS:  addressed    NUTRITION DIAGNOSIS is :  ( x)  Ongoing     NUTRITION RECOMMENDATIONS:   1. Continue diet as ordered. Family brings in food she likes at times.   2. Provide assistance with meals as needed.   3. Continue plan of care.     MONITORING AND EVALUATION:   1. Tolerance to diet prescription   2. PO intake  3. Weights  4. Labs  5. Follow Up per protocol     RD to remain available   Jessica Baltazar RDN #[id number]

## 2020-07-20 NOTE — PROGRESS NOTE ADULT - ASSESSMENT
SARA FARAH is a 85yo Female with hx multiple b/l CVas, now s/p acute right PCA CVA and decreased functional mobility, gait instability and ADL impairments.    Acute Right PCA CVA  -C/w PT/OT/SLP  -C/w ASA and atorvastatin  -fall and aspiration precautions  -puree diet  -no focal deficits      Dementia/Delerium  -Likely vascular dementia  -Waxing and waning mental status during rehab admission  -Recommend haldol as needed for episodes of agitation  -Nemenda BID    Seizure disorder  -EEG showed focal status epilepticus with eventual cessation  -Awake and alert on exam  -c/w depakote and Vimpat  -seizure precautions  -neurology follow up outpt      Essential HTN  -C/w metoprolol 50 mg daily  -Decreased lisinopril (started 7/13) to 2.5 mg daily, hold for SBP<140  -Normotensive toay    CAD s/p 5 vessel CABG  -[facility] 2019: Patent 3V CABG. Free LIMA (D1-LAD). SVG-OM, SVG-RCA.  -c/w metoprolol, ASA, statin  -c/w lisinopril as above    DVT prophylaxis  -Lovenox

## 2020-07-21 NOTE — PROGRESS NOTE ADULT - ASSESSMENT
SARA FARAH is a 87yo Female with hx multiple b/l CVas, now s/p acute right PCA CVA and decreased functional mobility, gait instability and ADL impairments.    Acute Right PCA CVA  -C/w PT/OT/SLP  -C/w ASA and atorvastatin  -fall and aspiration precautions  -puree diet  -no focal deficits    Dementia/Delerium  -Likely vascular dementia  -Waxing and waning mental status during rehab admission  -Nemenda BID  -Recommend quetiapine 12.5 mg as needed for agitation    Seizure disorder  -EEG showed focal status epilepticus with eventual cessation  -Awake and alert on exam  -c/w depakote and Vimpat  -seizure precautions  -neurology follow up outpt    Essential HTN  -C/w metoprolol 50 mg daily  -Decreased lisinopril (started 7/13) to 2.5 mg daily, hold for SBP<140  -Normotensive toay    CAD s/p 5 vessel CABG  -[facility] 2019: Patent 3V CABG. Free LIMA (D1-LAD). SVG-OM, SVG-RCA.  -c/w metoprolol, ASA, statin  -c/w lisinopril as above    DVT prophylaxis  -Lovenox

## 2020-07-21 NOTE — DISCHARGE NOTE PROVIDER - PROVIDER TOKENS
PROVIDER:[TOKEN:[[id number]]],PROVIDER:[TOKEN:[[id number]]] PROVIDER:[TOKEN:[[id number]]],PROVIDER:[TOKEN:[[id number]]],PROVIDER:[TOKEN:[[id number]]]

## 2020-07-21 NOTE — DISCHARGE NOTE PROVIDER - NSDCACTIVITY_GEN_ALL_CORE
Do not make important decisions/Stairs allowed/Showering allowed/Walking - Indoors allowed/Sex allowed/Do not drive or operate machinery/No heavy lifting/straining/Walking - Outdoors allowed

## 2020-07-21 NOTE — DISCHARGE NOTE PROVIDER - NSDCMRMEDTOKEN_GEN_ALL_CORE_FT
aspirin 81 mg oral delayed release tablet: 1 tab(s) orally once a day  atorvastatin 40 mg oral tablet: 1 tab(s) orally once a day (at bedtime)  calcium-vitamin D 500 mg-200 intl units (5 mcg) oral tablet: 1 tab(s) orally once a day  ciprofloxacin 250 mg oral tablet: 1 tab(s) orally every 12 hours  Co Q-10 100 mg oral capsule: 2 cap(s) orally once a day  divalproex sodium 125 mg oral delayed release capsule: 4 cap(s) orally   escitalopram 5 mg oral tablet: 1 tab(s) orally once a day  Fish Oil 1000 mg oral capsule: 2 cap(s) orally once a day  lacosamide 100 mg oral tablet: 1 tab(s) orally 2 times a day  melatonin 3 mg oral tablet: 1 tab(s) orally once a day (at bedtime)  memantine 5 mg oral tablet: 1 tab(s) orally 2 times a day  metoprolol succinate 50 mg oral tablet, extended release: 1 tab(s) orally once a day  Vitamin B-12 500 mcg oral tablet: 1 tab(s) orally once a day aspirin 81 mg oral delayed release tablet: 1 tab(s) orally once a day  atorvastatin 40 mg oral tablet: 1 tab(s) orally once a day (at bedtime)  calcium-vitamin D 500 mg-200 intl units (5 mcg) oral tablet: 1 tab(s) orally once a day  ciprofloxacin 250 mg oral tablet: 1 tab(s) orally every 12 hours  Co Q-10 100 mg oral capsule: 2 cap(s) orally once a day  divalproex sodium 125 mg oral delayed release capsule: 4 cap(s) orally 2 times a day   escitalopram 5 mg oral tablet: 1 tab(s) orally once a day  Fish Oil 1000 mg oral capsule: 2 cap(s) orally once a day  lacosamide 100 mg oral tablet: 1 tab(s) orally 2 times a day  lacosamide 100 mg oral tablet: 1 tab(s) orally 2 times a day MDD:200mg  melatonin 3 mg oral tablet: 1 tab(s) orally once a day (at bedtime)  memantine 5 mg oral tablet: 1 tab(s) orally 2 times a day  metoprolol succinate 50 mg oral tablet, extended release: 1 tab(s) orally once a day  Vitamin B-12 500 mcg oral tablet: 1 tab(s) orally once a day

## 2020-07-21 NOTE — DISCHARGE NOTE PROVIDER - HOSPITAL COURSE
83 yo female with PMH of multiple strokes of unknown etiology ( ILR negative follows with Dr. Lawrence), carotid endarterectomy, dementia, seizures (multiple GTCs in 2012, was on keppra but taken off all AEDs), CAD, CABG on ASA 81 admitted to [facility] ED on 6/30 as transfer from [facility] ?seizures. As per family, patient has been confused and with visual hallucinations with staring spells days prior. Brought to ED after family noted left sided shaking. CTH 6/30 showed chronic infarcts. Given Ativan and transferred to [facility] for cEEG monitoring. MRI at [facility] showed Acute right occipital PCA infarct. Also, multiple chronic cerebral infarcts bilaterally. Chronic right cerebellar infarcts.    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 No further seizures after 7/6 reported. Discharged on Depakote and Vimpat. Additionally, hospital course complicated by EColi UTI. Iv rocephin x 5 days completed. Evaluated by PM&R. Pt is neurologically stable for discharge to rehab on 7/8.        Of note, attempted to wean off of supplemental O2 (awake O2 sat is > 95, asleep Sat dropped to 90% without signs of apnea),  should tolerate weaning supplemental oxygen per d/c plan. 81 yo female with PMH of multiple strokes of unknown etiology ( ILR negative follows with Dr. Lawrence), carotid endarterectomy, dementia, seizures (multiple GTCs in 2012, was on keppra but taken off all AEDs), CAD, CABG on ASA 81 admitted to [facility] ED on 6/30 as transfer from [facility] ?seizures. As per family, patient has been confused and with visual hallucinations with staring spells days prior. Brought to ED after family noted left sided shaking. CTH 6/30 showed chronic infarcts. Given Ativan and transferred to [facility] for cEEG monitoring. MRI at [facility] showed Acute right occipital PCA infarct. Also, multiple chronic cerebral infarcts bilaterally. Chronic right cerebellar infarcts.    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 No further seizures after 7/6 reported. Discharged on Depakote and Vimpat. Additionally, hospital course complicated by EColi UTI. Iv rocephin x 5 days completed. Evaluated by PM&R. Pt is neurologically stable for discharge to rehab on 7/8.        At [facility] rehab patient completed comprehensive PT/OT/SLP program and tolerates activity with min A. Able to ambulate >100ft min A. While at rehab patient evaluated by medicine and psychiatry. medications adjusted. Seizure medications continued and no seizure episodes reported. Cipro started for UTI. Patient cleared for d/c home with HC on 7/22.

## 2020-07-21 NOTE — DISCHARGE NOTE PROVIDER - CARE PROVIDER_API CALL
Maury Lawrence  NEUROLOGY  [address] [place] [id number]  Phone: [phone]  Fax: [phone]  Follow Up Time:     Patricia Plummer  EEG/EPILEPSY  [address] ,[address]  [place] [address]  Phone: [phone]  Fax: [phone]  Follow Up Time: Maury Lawrence  NEUROLOGY  [address] [place] [address]  Phone: [phone]  Fax: [phone]  Follow Up Time:     Patricia Plummer  EEG/EPILEPSY  [address] ,[address]  [place] [address]  Phone: [phone]  Fax: ()-  Follow Up Time:     Vinay Floyd)  Internal Medicine  [address] [place] [address]  Phone: [phone]  Fax: [phone]  Follow Up Time:

## 2020-07-21 NOTE — PROGRESS NOTE ADULT - ASSESSMENT
SARA FARAH is a 87yo Female with hx multiple b/l CVas, now s/p acute right PCA CVA and decreased functional mobility, gait instability and ADL impairments.    #CVA  -ASA/Lipitor  - PT/OT/SLP.  - fall and aspiration precautions  - regular diet  -  Melatonin for insomnia, sundowns    #Seizures  -Depakote and Vimpat  -seizure precautions  -neurology follow up outpatient   -no seizures reported while on  rehab unit      #HTN  -Toprol  -monitor VS closely  -medicine is following    #Dementia  -Namenda   -neuropsychological eval completed    #UTI-   - Positive UA today , spoke with daughter who expressed concern about increasing confusion in [place] context of recurrent UTIs in the past   -completed antibiotics x5d course for EColi UTI recently  -obtain urine culture now and start Cipro empirically  awaiting results  - case discussed with Medicine   -low PVR

## 2020-07-21 NOTE — DISCHARGE NOTE PROVIDER - NSDCFUSCHEDAPPT_GEN_ALL_CORE_FT
SARA FARAH ; 10/07/2020 ; [facility] SARA FARAH ; 10/07/2020 ; NPP [facility]

## 2020-07-21 NOTE — DISCHARGE NOTE PROVIDER - NSDCCPCAREPLAN_GEN_ALL_CORE_FT
PRINCIPAL DISCHARGE DIAGNOSIS  Diagnosis: CVA (cerebrovascular accident)  Assessment and Plan of Treatment:       SECONDARY DISCHARGE DIAGNOSES  Diagnosis: Acute UTI  Assessment and Plan of Treatment:     Diagnosis: Dementia, multi-infarct, with behavioral disturbance  Assessment and Plan of Treatment:     Diagnosis: Seizure  Assessment and Plan of Treatment: PRINCIPAL DISCHARGE DIAGNOSIS  Diagnosis: CVA (cerebrovascular accident)  Assessment and Plan of Treatment: follow up neurology      SECONDARY DISCHARGE DIAGNOSES  Diagnosis: Acute UTI  Assessment and Plan of Treatment: complete cipro, drink plenty of fluids. Follow up with PCP.    Diagnosis: Dementia, multi-infarct, with behavioral disturbance  Assessment and Plan of Treatment: Continue namenda, follow up with PCP.    Diagnosis: Seizure  Assessment and Plan of Treatment: Continue Depakote and Vimpat, follow up Neurology/Seizure specialist.

## 2020-07-21 NOTE — DISCHARGE NOTE PROVIDER - CARE PROVIDERS DIRECT ADDRESSES
,[email or web address],DirectAddress_[email or web address] ,[email or web address],DirectAddress_Unknown,[email or web address]

## 2020-07-22 NOTE — PROGRESS NOTE ADULT - ASSESSMENT
SARA FARAH is a 87yo Female with hx multiple b/l CVas, now s/p acute right PCA CVA and decreased functional mobility, gait instability and ADL impairments.    #CVA  -ASA/Lipitor continues.   - completed PT/OT/SLP program. Cleared for d/c home on 7/22.   - fall and aspiration precautions at home  -  Melatonin for insomnia, sundowns    #Seizures  -Depakote and Vimpat  -neurology follow up outpatient   -no seizures reported while on  rehab unit      #HTN  -Toprol  -monitor VS daily at home. PCP follow up.    #Dementia  -Namenda   -neuropsychological eval completed    #UTI-   - Positive UA-on Cipro BID x5 d, spoke with daughter who expressed concern about increasing confusion in context of recurrent UTIs in the past   - case discussed with Medicine. PCP follow up in community.    -low PVR

## 2020-07-22 NOTE — PROGRESS NOTE ADULT - PROVIDER SPECIALTY LIST ADULT
Hospitalist
Neurology
Physiatry
Physiatry
Rehab Medicine
Rehab Medicine

## 2020-07-22 NOTE — PROGRESS NOTE ADULT - SUBJECTIVE AND OBJECTIVE BOX
CHIEF COMPLAINT: Impaired gait, balance, endurance. Poor appetite.      HISTORY OF PRESENT ILLNESS  83 yo female with PMH of multiple strokes of unknown etiology ( ILR negative follows with Dr. Lawrence), carotid endarterectomy, dementia, seizures (multiple GTCs in 2012, was on keppra but taken off all AEDs), CAD, CABG on ASA 81 admitted to [facility] ED on 6/30 as transfer from [facility] ?seizures. As per family, patient has been confused and with visual hallucinations with staring spells days prior. Brought to ED after family noted left sided shaking. CTH 6/30 showed chronic infarcts. Given Ativan and transferred to [facility] for cEEG monitoring. MRI at [facility] showed Acute right occipital PCA infarct. Also, multiple chronic cerebral infarcts bilaterally. Chronic right cerebellar infarcts.  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 No further seizures after 7/6 reported. Discharged on Depakote and Vimpat. Additionally, hospital course complicated by EColi UTI. Iv rocephin x 5 days completed. Evaluated by PM&R. Pt is neurologically stable for discharge to rehab on 7/8.    Of note, attempted to wean off of supplemental O2 (awake O2 sat is > 95, asleep Sat dropped to 90% without signs of apnea),  should tolerate weaning supplemental oxygen per d/c plan. (08 Jul 2020 13:19)      PAST MEDICAL & SURGICAL HISTORY:  Dementia  Breast cancer  Peripheral vision loss  CAD (coronary artery disease)  HLD (hyperlipidemia)  Seizure  TIA (transient ischemic attack)  H/O lumpectomy: Left breast  S/P tonsillectomy  S/P carotid endarterectomy: right, 2006  S/P CABG x 5: 2006        Subjective/ROS: Patient seen at bedside this morning. Patient in NAD, no SOB, no n/v, no pain.      VITALS  Vital Signs Last 24 Hrs  T(C): 36.4 (10 Jul 2020 20:40), Max: 36.4 (10 Jul 2020 20:40)  T(F): 97.5 (10 Jul 2020 20:40), Max: 97.5 (10 Jul 2020 20:40)  HR: 87 (11 Jul 2020 05:41) (86 - 87)  BP: 118/79 (11 Jul 2020 05:41) (118/79 - 143/79)  BP(mean): --  RR: 14 (11 Jul 2020 05:41) (14 - 15)  SpO2: 96% (11 Jul 2020 05:41) (96% - 98%)     PHYSICAL EXAM  Constitutional - Frail elderly female, NAD, Comfortable in chair  	HEENT - NCAT  	Neck - Supple, No limited ROM  	Chest - CTA bilaterally  	Cardiovascular - RRR  	Abdomen - BS+, Soft, NTND  	Extremities - No C/C/E, No calf tenderness   	Skin-no rash  	Wounds-none    	Neurologic Exam -Awake, Alert, AAO to self, hospital, July, confused to day of the week and year. Follows commands well. Fluent speech. Impaired attention and concentration  	   Motor - 5/5 strength all extremities, moves all against gravity.   	                       	   Sensory - Intact to LT  	   Reflexes - DTR Intact  	   Psychiatric - Mood stable        RECENT LABS                        12.7   4.49  )-----------( 157      ( 09 Jul 2020 07:40 )             39.0     07-09    141  |  104  |  21  ----------------------------<  102<H>  4.1   |  29  |  0.81    Ca    9.0      09 Jul 2020 07:40    TPro  6.8  /  Alb  2.9<L>  /  TBili  0.5  /  DBili  x   /  AST  38  /  ALT  46<H>  /  AlkPhos  87  07-09      LIVER FUNCTIONS - ( 09 Jul 2020 07:40 )  Alb: 2.9 g/dL / Pro: 6.8 g/dL / ALK PHOS: 87 U/L / ALT: 46 U/L / AST: 38 U/L / GGT: x             Direct LDL: 53 mg/dL (07-01-20 @ 09:23)      Valproic Acid Level, Serum: 77 ug/mL (07-06-20 @ 06:50)            RADIOLOGY/OTHER RESULTS      CURRENT MEDICATIONS  MEDICATIONS  (STANDING):  aspirin enteric coated 81 milliGRAM(s) Oral daily  atorvastatin 40 milliGRAM(s) Oral at bedtime  calcium carbonate 1250 mG  + Vitamin D (OsCal 500 + D) 1 Tablet(s) Oral daily  diVALproex Sprinkle 500 milliGRAM(s) Oral <User Schedule>  enoxaparin Injectable 40 milliGRAM(s) SubCutaneous daily  escitalopram 5 milliGRAM(s) Oral daily  lacosamide 100 milliGRAM(s) Oral two times a day  memantine 5 milliGRAM(s) Oral two times a day  metoprolol succinate ER 50 milliGRAM(s) Oral daily    MEDICATIONS  (PRN):
CHIEF COMPLAINT: Impaired balance, vision s/p CVA.      HISTORY OF PRESENT ILLNESS  81 yo female with PMH of multiple strokes of unknown etiology ( ILR negative follows with Dr. Lawrence), carotid endarterectomy, dementia, seizures (multiple GTCs in 2012, was on keppra but taken off all AEDs), CAD, CABG on ASA 81 admitted to [facility] ED on 6/30 as transfer from [facility] ?seizures. As per family, patient has been confused and with visual hallucinations with staring spells days prior. Brought to ED after family noted left sided shaking. CTH 6/30 showed chronic infarcts. Given Ativan and transferred to [facility] for cEEG monitoring. MRI at [facility] showed Acute right occipital PCA infarct. Also, multiple chronic cerebral infarcts bilaterally. Chronic right cerebellar infarcts.  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 No further seizures after 7/6 reported. Discharged on Depakote and Vimpat. Additionally, hospital course complicated by EColi UTI. Iv rocephin x 5 days completed. Evaluated by PM&R. Pt is neurologically stable for discharge to rehab on 7/8.    Of note, attempted to wean off of supplemental O2 (awake O2 sat is > 95, asleep Sat dropped to 90% without signs of apnea),  should tolerate weaning supplemental oxygen per d/c plan. (08 Jul 2020 13:19)      PAST MEDICAL & SURGICAL HISTORY:  Dementia  Breast cancer  Peripheral vision loss  CAD (coronary artery disease)  HLD (hyperlipidemia)  Seizure  TIA (transient ischemic attack)  H/O lumpectomy: Left breast  S/P tonsillectomy  S/P carotid endarterectomy: right, 2006  S/P CABG x 5: 2006    VITALS  Vital Signs Last 24 Hrs  T(C): 36.8 (09 Jul 2020 08:57), Max: 36.9 (08 Jul 2020 20:15)  T(F): 98.2 (09 Jul 2020 08:57), Max: 98.4 (08 Jul 2020 20:15)  HR: 77 (09 Jul 2020 08:57) (77 - 92)  BP: 132/84 (09 Jul 2020 08:57) (116/70 - 152/85)  BP(mean): --  RR: 14 (09 Jul 2020 08:57) (14 - 15)  SpO2: 99% (09 Jul 2020 08:57) (96% - 99%)      PHYSICAL EXAM  Constitutional - Frail elderly female, NAD, Comfortable in bed  	HEENT - NCAT, EOMI  	Neck - Supple, No limited ROM  	Chest - CTA bilaterally  	Cardiovascular - RR  	Abdomen - BS+, Soft, NTND  	Extremities - No C/C/E, No calf tenderness   	Skin-no rash  	Wounds-none      	Neurologic Exam -Awake, Alert, AAO to self, hospital, July, confused to day of the week and year. Follows commands well. Fluent speech. Impaired attention and concentration.  	   Cranial Nerves - EOMI, no nystagmus, mild right ptosis, LVF cut, possible right lower VF cut as well-distractible. Dysphagia.   	   Motor - 5-/5 strength all extremities, moves all against gravity.   	                       	   Sensory - Intact to LT  	   Reflexes - DTR Intact  	   Toes are flexor  	   Coordination/dysmetria - mild dysmetria  	        	   Balance - impaired  	   Psychiatric - Mood stable, Affect flat    FUNCTIONAL PROGRESS  Gait - eval   ADLs - eval  Transfers -eval  Functional transfer -eval     RECENT LABS                        12.7   4.49  )-----------( 157      ( 09 Jul 2020 07:40 )             39.0     07-09    141  |  104  |  21  ----------------------------<  102<H>  4.1   |  29  |  0.81    Ca    9.0      09 Jul 2020 07:40    TPro  6.8  /  Alb  2.9<L>  /  TBili  0.5  /  DBili  x   /  AST  38  /  ALT  46<H>  /  AlkPhos  87  07-09      LIVER FUNCTIONS - ( 09 Jul 2020 07:40 )  Alb: 2.9 g/dL / Pro: 6.8 g/dL / ALK PHOS: 87 U/L / ALT: 46 U/L / AST: 38 U/L / GGT: x             Direct LDL: 53 mg/dL (07-01-20 @ 09:23)      Valproic Acid Level, Serum: 77 ug/mL (07-06-20 @ 06:50)            RADIOLOGY/OTHER RESULTS      CURRENT MEDICATIONS  MEDICATIONS  (STANDING):  aspirin enteric coated 81 milliGRAM(s) Oral daily  atorvastatin 40 milliGRAM(s) Oral at bedtime  calcium carbonate 1250 mG  + Vitamin D (OsCal 500 + D) 1 Tablet(s) Oral daily  diVALproex Sprinkle 500 milliGRAM(s) Oral <User Schedule>  enoxaparin Injectable 40 milliGRAM(s) SubCutaneous daily  escitalopram 5 milliGRAM(s) Oral daily  lacosamide 100 milliGRAM(s) Oral two times a day  memantine 5 milliGRAM(s) Oral two times a day  metoprolol succinate ER 50 milliGRAM(s) Oral daily    MEDICATIONS  (PRN):      ASSESSMENT & PLAN          GI/Bowel Management - miralax, senna   Management - Toilet Q2  Skin - Turn Q2  Pain - Tylenol PRN  DVT PPX - lovenox  Diet - advanced to [facility] soft with thins, nutrition eval for poor appetite    Start comprehensive acute rehab program consisting of 3hrs/day of OT/PT and SLP.
CHIEF COMPLAINT: Impaired gait, balance, endurance. Poor appetite.      HISTORY OF PRESENT ILLNESS  81 yo female with PMH of multiple strokes of unknown etiology ( ILR negative follows with Dr. Lawrence), carotid endarterectomy, dementia, seizures (multiple GTCs in 2012, was on keppra but taken off all AEDs), CAD, CABG on ASA 81 admitted to [facility] ED on 6/30 as transfer from [facility] ?seizures. As per family, patient has been confused and with visual hallucinations with staring spells days prior. Brought to ED after family noted left sided shaking. CTH 6/30 showed chronic infarcts. Given Ativan and transferred to [facility] for cEEG monitoring. MRI at [facility] showed Acute right occipital PCA infarct. Also, multiple chronic cerebral infarcts bilaterally. Chronic right cerebellar infarcts.  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 No further seizures after 7/6 reported. Discharged on Depakote and Vimpat. Additionally, hospital course complicated by EColi UTI. Iv rocephin x 5 days completed. Evaluated by PM&R. Pt is neurologically stable for discharge to rehab on 7/8.    Of note, attempted to wean off of supplemental O2 (awake O2 sat is > 95, asleep Sat dropped to 90% without signs of apnea),  should tolerate weaning supplemental oxygen per d/c plan. (08 Jul 2020 13:19)      PAST MEDICAL & SURGICAL HISTORY:  Dementia  Breast cancer  Peripheral vision loss  CAD (coronary artery disease)  HLD (hyperlipidemia)  Seizure  TIA (transient ischemic attack)  H/O lumpectomy: Left breast  S/P tonsillectomy  S/P carotid endarterectomy: right, 2006  S/P CABG x 5: 2006        Subjective/ROS: Patient seen at bedside this morning. Patient in NAD, no SOB, no n/v, no pain.      VITALS  Vital Signs Last 24 Hrs  T(C): 36.3 (12 Jul 2020 07:30), Max: 36.5 (11 Jul 2020 19:22)  T(F): 97.3 (12 Jul 2020 07:30), Max: 97.7 (11 Jul 2020 19:22)  HR: 73 (12 Jul 2020 07:30) (71 - 82)  BP: 124/78 (12 Jul 2020 07:30) (119/71 - 156/81)  BP(mean): --  RR: 17 (12 Jul 2020 07:30) (14 - 17)  SpO2: 93% (12 Jul 2020 07:30) (93% - 100%)     PHYSICAL EXAM  Constitutional - Frail elderly female, NAD  	HEENT - NCAT  	Neck - Supple, No limited ROM  	Chest - CTA bilaterally  	Cardiovascular - RRR  	Abdomen - BS+, Soft, NTND  	Extremities - No C/C/E, No calf tenderness   	Wounds-none    	Neurologic Exam -Awake, Alert, AAO to self  	   Motor - 5/5 strength all extremities, moves all against gravity.   	                       	   Sensory - Intact to LT  	   Reflexes - DTR Intact  	   Psychiatric - Mood stable        RECENT LABS                        12.7   4.49  )-----------( 157      ( 09 Jul 2020 07:40 )             39.0     07-09    141  |  104  |  21  ----------------------------<  102<H>  4.1   |  29  |  0.81    Ca    9.0      09 Jul 2020 07:40    TPro  6.8  /  Alb  2.9<L>  /  TBili  0.5  /  DBili  x   /  AST  38  /  ALT  46<H>  /  AlkPhos  87  07-09      LIVER FUNCTIONS - ( 09 Jul 2020 07:40 )  Alb: 2.9 g/dL / Pro: 6.8 g/dL / ALK PHOS: 87 U/L / ALT: 46 U/L / AST: 38 U/L / GGT: x             Direct LDL: 53 mg/dL (07-01-20 @ 09:23)      Valproic Acid Level, Serum: 77 ug/mL (07-06-20 @ 06:50)            RADIOLOGY/OTHER RESULTS      CURRENT MEDICATIONS  MEDICATIONS  (STANDING):  aspirin enteric coated 81 milliGRAM(s) Oral daily  atorvastatin 40 milliGRAM(s) Oral at bedtime  calcium carbonate 1250 mG  + Vitamin D (OsCal 500 + D) 1 Tablet(s) Oral daily  diVALproex Sprinkle 500 milliGRAM(s) Oral <User Schedule>  enoxaparin Injectable 40 milliGRAM(s) SubCutaneous daily  escitalopram 5 milliGRAM(s) Oral daily  lacosamide 100 milliGRAM(s) Oral two times a day  memantine 5 milliGRAM(s) Oral two times a day  metoprolol succinate ER 50 milliGRAM(s) Oral daily    MEDICATIONS  (PRN):
CHIEF COMPLAINT: Impaired gait, balance, endurance. Poor appetite.      HISTORY OF PRESENT ILLNESS  83 yo female with PMH of multiple strokes of unknown etiology ( ILR negative follows with Dr. Lawrence), carotid endarterectomy, dementia, seizures (multiple GTCs in 2012, was on keppra but taken off all AEDs), CAD, CABG on ASA 81 admitted to [facility] ED on 6/30 as transfer from [facility] ?seizures. As per family, patient has been confused and with visual hallucinations with staring spells days prior. Brought to ED after family noted left sided shaking. CTH 6/30 showed chronic infarcts. Given Ativan and transferred to [facility] for cEEG monitoring. MRI at [facility] showed Acute right occipital PCA infarct. Also, multiple chronic cerebral infarcts bilaterally. Chronic right cerebellar infarcts.  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 No further seizures after 7/6 reported. Discharged on Depakote and Vimpat. Additionally, hospital course complicated by EColi UTI. Iv rocephin x 5 days completed. Evaluated by PM&R. Pt is neurologically stable for discharge to rehab on 7/8.    Of note, attempted to wean off of supplemental O2 (awake O2 sat is > 95, asleep Sat dropped to 90% without signs of apnea),  should tolerate weaning supplemental oxygen per d/c plan. (08 Jul 2020 13:19)      PAST MEDICAL & SURGICAL HISTORY:  Dementia  Breast cancer  Peripheral vision loss  CAD (coronary artery disease)  HLD (hyperlipidemia)  Seizure  TIA (transient ischemic attack)  H/O lumpectomy: Left breast  S/P tonsillectomy  S/P carotid endarterectomy: right, 2006  S/P CABG x 5: 2006      VITALS  Vital Signs Last 24 Hrs  T(C): 36.5 (10 Jul 2020 07:24), Max: 36.6 (09 Jul 2020 20:36)  T(F): 97.7 (10 Jul 2020 07:24), Max: 97.9 (09 Jul 2020 20:36)  HR: 75 (10 Jul 2020 07:24) (75 - 80)  BP: 139/82 (10 Jul 2020 07:24) (122/74 - 149/84)  BP(mean): --  RR: 14 (10 Jul 2020 07:24) (14 - 15)  SpO2: 96% (10 Jul 2020 07:24) (95% - 99%)     PHYSICAL EXAM  Constitutional - Frail elderly female, NAD, Comfortable in chair  	HEENT - NCAT, EOMI  	Neck - Supple, No limited ROM  	Chest - CTA bilaterally  	Cardiovascular - RR  	Abdomen - BS+, Soft, NTND  	Extremities - No C/C/E, No calf tenderness   	Skin-no rash  	Wounds-none      	Neurologic Exam -Awake, Alert, AAO to self, hospital, July, confused to day of the week and year. Follows commands well. Fluent speech. Impaired attention and concentration.  	   Cranial Nerves - EOMI, no nystagmus, mild right ptosis, LVF cut, possible right lower VF cut as well-distractible. Dysphagia.   	   Motor - 5-/5 strength all extremities, moves all against gravity.   	                       	   Sensory - Intact to LT  	   Reflexes - DTR Intact  	   Toes are flexor  	   Coordination/dysmetria - mild dysmetria  	        	   Balance - impaired, shuffling gait with stooped posture   	   Psychiatric - Mood stable, Affect flat    FUNCTIONAL PROGRESS  Gait - 30ft RW max/mod A  ADLs - mod/max A  Transfers -max A  Functional transfer -max A      RECENT LABS                        12.7   4.49  )-----------( 157      ( 09 Jul 2020 07:40 )             39.0     07-09    141  |  104  |  21  ----------------------------<  102<H>  4.1   |  29  |  0.81    Ca    9.0      09 Jul 2020 07:40    TPro  6.8  /  Alb  2.9<L>  /  TBili  0.5  /  DBili  x   /  AST  38  /  ALT  46<H>  /  AlkPhos  87  07-09      LIVER FUNCTIONS - ( 09 Jul 2020 07:40 )  Alb: 2.9 g/dL / Pro: 6.8 g/dL / ALK PHOS: 87 U/L / ALT: 46 U/L / AST: 38 U/L / GGT: x             Direct LDL: 53 mg/dL (07-01-20 @ 09:23)      Valproic Acid Level, Serum: 77 ug/mL (07-06-20 @ 06:50)            RADIOLOGY/OTHER RESULTS      CURRENT MEDICATIONS  MEDICATIONS  (STANDING):  aspirin enteric coated 81 milliGRAM(s) Oral daily  atorvastatin 40 milliGRAM(s) Oral at bedtime  calcium carbonate 1250 mG  + Vitamin D (OsCal 500 + D) 1 Tablet(s) Oral daily  diVALproex Sprinkle 500 milliGRAM(s) Oral <User Schedule>  enoxaparin Injectable 40 milliGRAM(s) SubCutaneous daily  escitalopram 5 milliGRAM(s) Oral daily  lacosamide 100 milliGRAM(s) Oral two times a day  memantine 5 milliGRAM(s) Oral two times a day  metoprolol succinate ER 50 milliGRAM(s) Oral daily    MEDICATIONS  (PRN):      ASSESSMENT & PLAN    GI/Bowel Management - miralax, senna   Management - Toilet Q2, check PVR daily  Skin - Turn Q2  Pain - Tylenol PRN  DVT PPX - lovenox  Diet - advanced to [facility] soft with thins, nutrition eval for poor appetite completed    Continue comprehensive acute rehab program consisting of 3hrs/day of OT/PT and SLP.
CHIEF COMPLAINT: NAD in chair, eating lunch, offers no complaints.      HISTORY OF PRESENT ILLNESS  83 yo female with PMH of multiple strokes of unknown etiology ( ILR negative follows with Dr. Lawrence), carotid endarterectomy, dementia, seizures (multiple GTCs in 2012, was on keppra but taken off all AEDs), CAD, CABG on ASA 81 admitted to [facility] ED on 6/30 as transfer from [facility] ?seizures. As per family, patient has been confused and with visual hallucinations with staring spells days prior. Brought to ED after family noted left sided shaking. CTH 6/30 showed chronic infarcts. Given Ativan and transferred to [facility] for cEEG monitoring. MRI at [facility] showed Acute right occipital PCA infarct. Also, multiple chronic cerebral infarcts bilaterally. Chronic right cerebellar infarcts.  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 No further seizures after 7/6 reported. Discharged on Depakote and Vimpat. Additionally, hospital course complicated by EColi UTI. Iv rocephin x 5 days completed. Evaluated by PM&R. Pt is neurologically stable for discharge to rehab on 7/8.    Of note, attempted to wean off of supplemental O2 (awake O2 sat is > 95, asleep Sat dropped to 90% without signs of apnea),  should tolerate weaning supplemental oxygen per d/c plan. (08 Jul 2020 13:19)      PAST MEDICAL & SURGICAL HISTORY:  Dementia  Breast cancer  Peripheral vision loss  CAD (coronary artery disease)  HLD (hyperlipidemia)  Seizure  TIA (transient ischemic attack)  H/O lumpectomy: Left breast  S/P tonsillectomy  S/P carotid endarterectomy: right, 2006  S/P CABG x 5: 2006      VITALS  Vital Signs Last 24 Hrs  T(C): 36.8 (19 Jul 2020 20:17), Max: 36.8 (19 Jul 2020 20:17)  T(F): 98.3 (19 Jul 2020 20:17), Max: 98.3 (19 Jul 2020 20:17)  HR: 70 (20 Jul 2020 05:40) (70 - 76)  BP: 119/74 (20 Jul 2020 05:40) (119/74 - 145/81)  BP(mean): --  RR: 14 (20 Jul 2020 05:40) (14 - 14)  SpO2: 99% (20 Jul 2020 05:40) (99% - 99%)    PHYSICAL EXAM  Constitutional - Frail elderly female, NAD, Comfortable in chair  	HEENT - NCAT, EOMI  	Neck - Supple, No limited ROM  	Chest - CTA bilaterally  	Cardiovascular - RR  	Abdomen - BS+, Soft, NTND  	Extremities - No C/C/E, No calf tenderness   	Skin-no rash  	Wounds-none      	Neurologic Exam -Awake, Alert, AAO to self, hospital, July, confused to day of the week and year. Follows commands well. Fluent speech. Impaired attention and concentration.  	   Cranial Nerves - EOMI, no nystagmus, mild right ptosis, LVF cut, possible right lower VF cut as well-distractible. Dysphagia.   	   Motor - 5-/5 strength all extremities, moves all against gravity.   	                       	   Sensory - Intact to LT  	   Reflexes - DTR Intact  	   Toes are flexor  	   Coordination/dysmetria - mild dysmetria  	        	   Balance - impaired, shuffling gait with stooped posture   	   Psychiatric - Mood stable, Affect flat    FUNCTIONAL PROGRESS  Gait - 30ft RW max/mod A  ADLs - mod/max A  Transfers -max A  Functional transfer -max A     RECENT LABS                        12.9   4.76  )-----------( 125      ( 19 Jul 2020 06:08 )             38.5     07-19    141  |  105  |  26<H>  ----------------------------<  78  4.1   |  25  |  0.93    Ca    8.9      19 Jul 2020 06:08    TPro  6.2  /  Alb  3.1<L>  /  TBili  0.5  /  DBili  x   /  AST  36  /  ALT  32  /  AlkPhos  61  07-19      LIVER FUNCTIONS - ( 19 Jul 2020 06:08 )  Alb: 3.1 g/dL / Pro: 6.2 g/dL / ALK PHOS: 61 U/L / ALT: 32 U/L / AST: 36 U/L / GGT: x             Direct LDL: 53 mg/dL (07-01-20 @ 09:23)      Valproic Acid Level, Serum: 77 ug/mL (07-06-20 @ 06:50)            RADIOLOGY/OTHER RESULTS      CURRENT MEDICATIONS  MEDICATIONS  (STANDING):  aspirin enteric coated 81 milliGRAM(s) Oral daily  atorvastatin 40 milliGRAM(s) Oral at bedtime  calcium carbonate 1250 mG  + Vitamin D (OsCal 500 + D) 1 Tablet(s) Oral daily  diVALproex Sprinkle 500 milliGRAM(s) Oral <User Schedule>  enoxaparin Injectable 40 milliGRAM(s) SubCutaneous daily  escitalopram 5 milliGRAM(s) Oral daily  lacosamide 100 milliGRAM(s) Oral two times a day  melatonin 3 milliGRAM(s) Oral at bedtime  memantine 5 milliGRAM(s) Oral two times a day  metoprolol succinate ER 50 milliGRAM(s) Oral daily    MEDICATIONS  (PRN):      ASSESSMENT & PLAN    GI/Bowel Management - miralax, senna   Management - Toilet Q2, check PVR daily  Skin - Turn Q2  Pain - Tylenol PRN  DVT PPX - lovenox  [facility], nutrition eval for poor appetite completed    Continue comprehensive acute rehab program consisting of 3hrs/day of OT/PT and SLP.
CHIEF COMPLAINT: Poor sleep.       HISTORY OF PRESENT ILLNESS  83 yo female with PMH of multiple strokes of unknown etiology ( ILR negative follows with Dr. Lawrence), carotid endarterectomy, dementia, seizures (multiple GTCs in , was on keppra but taken off all AEDs), CAD, CABG on ASA 81 admitted to [facility] ED on  as transfer from [facility] ?seizures. As per family, patient has been confused and with visual hallucinations with staring spells days prior. Brought to ED after family noted left sided shaking. CTH  showed chronic infarcts. Given Ativan and transferred to [facility] for cEEG monitoring. MRI at [facility] showed Acute right occipital PCA infarct. Also, multiple chronic cerebral infarcts bilaterally. Chronic right cerebellar infarcts.  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 No further seizures after  reported. Discharged on Depakote and Vimpat. Additionally, hospital course complicated by EColi UTI. Iv rocephin x 5 days completed. Evaluated by PM&R. Pt is neurologically stable for discharge to rehab on .    Of note, attempted to wean off of supplemental O2 (awake O2 sat is > 95, asleep Sat dropped to 90% without signs of apnea),  should tolerate weaning supplemental oxygen per d/c plan. (2020 13:19)      PAST MEDICAL & SURGICAL HISTORY:  Dementia  Breast cancer  Peripheral vision loss  CAD (coronary artery disease)  HLD (hyperlipidemia)  Seizure  TIA (transient ischemic attack)  H/O lumpectomy: Left breast  S/P tonsillectomy  S/P carotid endarterectomy: right,   S/P CABG x 5: 2006      VITALS  Vital Signs Last 24 Hrs  T(C): 36.4 (2020 07:55), Max: 36.7 (2020 20:31)  T(F): 97.5 (2020 07:55), Max: 98 (2020 20:31)  HR: 69 (2020 07:55) (69 - 73)  BP: 105/64 (2020 07:55) (105/64 - 134/80)  BP(mean): --  RR: 14 (2020 07:55) (14 - 14)  SpO2: 94% (2020 07:55) (94% - 100%)    PHYSICAL EXAM  Constitutional - NAD  HEENT - NCAT, EOMI  Neck - Supple, No limited ROM  Chest - CTA bilaterally  Cardiovascular - RRR  Abdomen -  Soft, NTND  Extremities - No calf tenderness   Skin-no rash  Neurologic Exam -no new focal deficits                 FUNCTIONAL PROGRESS  Gait - 160ft min A, climbs 12 steps min A  ADLs - min A  Transfers - min A  Functional transfer - min A    RECENT LABS      Urinalysis Basic - ( 2020 11:08 )    Color: Yellow / Appearance: Slightly Turbid / S.025 / pH: x  Gluc: x / Ketone: Small  / Bili: Negative / Urobili: 1   Blood: x / Protein: 30 mg/dL / Nitrite: Negative   Leuk Esterase: Moderate / RBC: 26-50 /HPF / WBC 6-10 /HPF   Sq Epi: x / Non Sq Epi: Many / Bacteria: Moderate /HPF      Direct LDL: 53 mg/dL (20 @ 09:23)      Valproic Acid Level, Serum: 77 ug/mL (20 @ 06:50)            RADIOLOGY/OTHER RESULTS      CURRENT MEDICATIONS  MEDICATIONS  (STANDING):  aspirin enteric coated 81 milliGRAM(s) Oral daily  atorvastatin 40 milliGRAM(s) Oral at bedtime  calcium carbonate 1250 mG  + Vitamin D (OsCal 500 + D) 1 Tablet(s) Oral daily  ciprofloxacin     Tablet 250 milliGRAM(s) Oral every 12 hours  diVALproex Sprinkle 500 milliGRAM(s) Oral <User Schedule>  enoxaparin Injectable 40 milliGRAM(s) SubCutaneous daily  escitalopram 5 milliGRAM(s) Oral daily  lacosamide 100 milliGRAM(s) Oral two times a day  melatonin 3 milliGRAM(s) Oral at bedtime  memantine 5 milliGRAM(s) Oral two times a day  metoprolol succinate ER 50 milliGRAM(s) Oral daily    MEDICATIONS  (PRN):      ASSESSMENT & PLAN    Completed comprehensive acute rehab program consisting of 3hrs/day of OT/PT and SLP. D/c home today with home care.
CHIEF COMPLAINT: confused, restless at times, but cooperative in tehrapy       HISTORY OF PRESENT ILLNESS    81 yo female with PMH of multiple strokes of unknown etiology ( ILR negative follows with Dr. Lawrence), carotid endarterectomy, dementia, seizures (multiple GTCs in , was on keppra but taken off all AEDs), CAD, CABG on ASA 81 admitted to [facility] ED on  as transfer from [facility] ?seizures. As per family, patient has been confused and with visual hallucinations with staring spells days prior. Brought to ED after family noted left sided shaking. CTH  showed chronic infarcts. Given Ativan and transferred to [facility] for cEEG monitoring. MRI at [facility] showed Acute right occipital PCA infarct. Also, multiple chronic cerebral infarcts bilaterally. Chronic right cerebellar infarcts.  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 No further seizures after  reported. Discharged on Depakote and Vimpat. Additionally, hospital course complicated by EColi UTI. Iv rocephin x 5 days completed. Evaluated by PM&R. Pt is neurologically stable for discharge to rehab on .    Of note, attempted to wean off of supplemental O2 (awake O2 sat is > 95, asleep Sat dropped to 90% without signs of apnea),  should tolerate weaning supplemental oxygen per d/c plan. (2020 13:19)        PAST MEDICAL & SURGICAL HISTORY:  Dementia  Breast cancer  Peripheral vision loss  CAD (coronary artery disease)  HLD (hyperlipidemia)  Seizure  TIA (transient ischemic attack)  H/O lumpectomy: Left breast  S/P tonsillectomy  S/P carotid endarterectomy: right,   S/P CABG x 5: 2006       VITALS  Vital Signs Last 24 Hrs  T(C): 36.5 (2020 08:28), Max: 36.7 (2020 13:39)  T(F): 97.7 (2020 08:28), Max: 98 (2020 13:39)  HR: 74 (2020 08:28) (69 - 75)  BP: 134/83 (2020 08:28) (107/64 - 134/83)  BP(mean): --  RR: 13 (2020 08:28) (13 - 15)  SpO2: 96% (2020 08:28) (95% - 98%)      PHYSICAL EXAM  Constitutional - NAD  HEENT - NCAT, EOMI  Neck - Supple, No limited ROM  Chest - CTA bilaterally  Cardiovascular - RRR  Abdomen -  Soft, NTND  Extremities - No calf tenderness   Skin-no rash  Neurologic Exam -no new focal deficits                       Urinalysis Basic - ( 2020 11:08 )    Color: Yellow / Appearance: Slightly Turbid / S.025 / pH: x  Gluc: x / Ketone: Small  / Bili: Negative / Urobili: 1   Blood: x / Protein: 30 mg/dL / Nitrite: Negative   Leuk Esterase: Moderate / RBC: 26-50 /HPF / WBC 6-10 /HPF   Sq Epi: x / Non Sq Epi: Many / Bacteria: Moderate /HPF      Direct LDL: 53 mg/dL (20 @ 09:23)      Valproic Acid Level, Serum: 77 ug/mL (20 @ 06:50)          Urinalysis Basic - ( 2020 11:08 )    Color: Yellow / Appearance: Slightly Turbid / S.025 / pH: x  Gluc: x / Ketone: Small  / Bili: Negative / Urobili: 1   Blood: x / Protein: 30 mg/dL / Nitrite: Negative   Leuk Esterase: Moderate / RBC: 26-50 /HPF / WBC 6-10 /HPF   Sq Epi: x / Non Sq Epi: Many / Bacteria: Moderate /HPF          RADIOLOGY/OTHER RESULTS      CURRENT MEDICATIONS  MEDICATIONS  (STANDING):  aspirin enteric coated 81 milliGRAM(s) Oral daily  atorvastatin 40 milliGRAM(s) Oral at bedtime  calcium carbonate 1250 mG  + Vitamin D (OsCal 500 + D) 1 Tablet(s) Oral daily  ciprofloxacin     Tablet 250 milliGRAM(s) Oral every 12 hours  diVALproex Sprinkle 500 milliGRAM(s) Oral <User Schedule>  enoxaparin Injectable 40 milliGRAM(s) SubCutaneous daily  escitalopram 5 milliGRAM(s) Oral daily  lacosamide 100 milliGRAM(s) Oral two times a day  melatonin 3 milliGRAM(s) Oral at bedtime  memantine 5 milliGRAM(s) Oral two times a day  metoprolol succinate ER 50 milliGRAM(s) Oral daily
CHIEF COMPLAINT: no new complaints      HISTORY OF PRESENT ILLNESS    81 yo female with PMH of multiple strokes of unknown etiology ( ILR negative follows with Dr. Lawrence), carotid endarterectomy, dementia, seizures (multiple GTCs in 2012, was on keppra but taken off all AEDs), CAD, CABG on ASA 81 admitted to [facility] ED on 6/30 as transfer from [facility] ?seizures. As per family, patient has been confused and with visual hallucinations with staring spells days prior. Brought to ED after family noted left sided shaking. CTH 6/30 showed chronic infarcts. Given Ativan and transferred to [facility] for cEEG monitoring. MRI at [facility] showed Acute right occipital PCA infarct. Also, multiple chronic cerebral infarcts bilaterally. Chronic right cerebellar infarcts.  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 No further seizures after 7/6 reported. Discharged on Depakote and Vimpat. Additionally, hospital course complicated by EColi UTI. Iv rocephin x 5 days completed. Evaluated by PM&R. Pt is neurologically stable for discharge to rehab on 7/8.    Of note, attempted to wean off of supplemental O2 (awake O2 sat is > 95, asleep Sat dropped to 90% without signs of apnea),  should tolerate weaning supplemental oxygen per d/c plan. (08 Jul 2020 13:19)      PAST MEDICAL & SURGICAL HISTORY:  Dementia  Breast cancer  Peripheral vision loss  CAD (coronary artery disease)  HLD (hyperlipidemia)  Seizure  TIA (transient ischemic attack)  H/O lumpectomy: Left breast  S/P tonsillectomy  S/P carotid endarterectomy: right, 2006  S/P CABG x 5: 2006          VITALS  Vital Signs Last 24 Hrs  T(C): 36.7 (16 Jul 2020 08:40), Max: 36.9 (15 Jul 2020 20:10)  T(F): 98 (16 Jul 2020 08:40), Max: 98.4 (15 Jul 2020 20:10)  HR: 75 (16 Jul 2020 08:40) (70 - 77)  BP: 126/75 (16 Jul 2020 08:40) (116/66 - 126/75)  BP(mean): --  RR: 15 (16 Jul 2020 08:40) (14 - 15)  SpO2: 100% (16 Jul 2020 08:40) (97% - 100%)      PHYSICAL EXAM  Constitutional - NAD  HEENT - NCAT, EOMI  Neck - Supple, No limited ROM  Chest - CTA bilaterally  Cardiovascular - RRR  Abdomen - BS+, Soft, NTND  Extremities - No calf tenderness   Neurologic Exam -  no new  focal deficits                  RECENT LABS                Direct LDL: 53 mg/dL (07-01-20 @ 09:23)      Valproic Acid Level, Serum: 77 ug/mL (07-06-20 @ 06:50)      CURRENT MEDICATIONS  MEDICATIONS  (STANDING):  aspirin enteric coated 81 milliGRAM(s) Oral daily  atorvastatin 40 milliGRAM(s) Oral at bedtime  calcium carbonate 1250 mG  + Vitamin D (OsCal 500 + D) 1 Tablet(s) Oral daily  diVALproex Sprinkle 500 milliGRAM(s) Oral <User Schedule>  enoxaparin Injectable 40 milliGRAM(s) SubCutaneous daily  escitalopram 5 milliGRAM(s) Oral daily  lacosamide 100 milliGRAM(s) Oral two times a day  lisinopril 2.5 milliGRAM(s) Oral daily  melatonin 3 milliGRAM(s) Oral at bedtime  memantine 5 milliGRAM(s) Oral two times a day  metoprolol succinate ER 50 milliGRAM(s) Oral daily
CHIEF COMPLAINT: no new complaints      HISTORY OF PRESENT ILLNESS    83 yo female with PMH of multiple strokes of unknown etiology ( ILR negative follows with Dr. Lawrence), carotid endarterectomy, dementia, seizures (multiple GTCs in 2012, was on keppra but taken off all AEDs), CAD, CABG on ASA 81 admitted to [facility] ED on 6/30 as transfer from [facility] ?seizures. As per family, patient has been confused and with visual hallucinations with staring spells days prior. Brought to ED after family noted left sided shaking. CTH 6/30 showed chronic infarcts. Given Ativan and transferred to [facility] for cEEG monitoring. MRI at [facility] showed Acute right occipital PCA infarct. Also, multiple chronic cerebral infarcts bilaterally. Chronic right cerebellar infarcts.  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 No further seizures after 7/6 reported. Discharged on Depakote and Vimpat. Additionally, hospital course complicated by EColi UTI. Iv rocephin x 5 days completed. Evaluated by PM&R. Pt is neurologically stable for discharge to rehab on 7/8.            PAST MEDICAL & SURGICAL HISTORY:  Dementia  Breast cancer  Peripheral vision loss  CAD (coronary artery disease)  HLD (hyperlipidemia)  Seizure  TIA (transient ischemic attack)  H/O lumpectomy: Left breast  S/P tonsillectomy  S/P carotid endarterectomy: right, 2006  S/P CABG x 5: 2006    VITALS  Vital Signs Last 24 Hrs  T(C): 36.3 (15 Jul 2020 05:26), Max: 36.6 (14 Jul 2020 21:54)  T(F): 97.3 (15 Jul 2020 05:26), Max: 97.9 (14 Jul 2020 21:54)  HR: 72 (15 Jul 2020 05:26) (72 - 77)  BP: 144/82 (15 Jul 2020 05:26) (121/76 - 144/82)  BP(mean): --  RR: 16 (15 Jul 2020 05:26) (16 - 16)  SpO2: 95% (15 Jul 2020 05:26) (95% - 97%)      PHYSICAL EXAM  Constitutional - NAD, Comfortable  HEENT - NCAT, EOMI  Neck - Supple, No limited ROM  Chest - CTA bilaterally  Cardiovascular - RRR, S1S2, No murmurs  Abdomen - BS+, Soft, NTND  Neurologic Exam - no new focal deficits                     RECENT LABS                        12.2   4.18  )-----------( 147      ( 14 Jul 2020 05:30 )             37.7     07-14    140  |  105  |  25<H>  ----------------------------<  82  4.2   |  20<L>  |  0.80    Ca    8.9      14 Jul 2020 05:30    TPro  6.4  /  Alb  3.0<L>  /  TBili  0.4  /  DBili  x   /  AST  40  /  ALT  32  /  AlkPhos  69  07-14      LIVER FUNCTIONS - ( 14 Jul 2020 05:30 )  Alb: 3.0 g/dL / Pro: 6.4 g/dL / ALK PHOS: 69 U/L / ALT: 32 U/L / AST: 40 U/L / GGT: x             Direct LDL: 53 mg/dL (07-01-20 @ 09:23)      Valproic Acid Level, Serum: 77 ug/mL (07-06-20 @ 06:50)      CURRENT MEDICATIONS  MEDICATIONS  (STANDING):  aspirin enteric coated 81 milliGRAM(s) Oral daily  atorvastatin 40 milliGRAM(s) Oral at bedtime  calcium carbonate 1250 mG  + Vitamin D (OsCal 500 + D) 1 Tablet(s) Oral daily  diVALproex Sprinkle 500 milliGRAM(s) Oral <User Schedule>  enoxaparin Injectable 40 milliGRAM(s) SubCutaneous daily  escitalopram 5 milliGRAM(s) Oral daily  lacosamide 100 milliGRAM(s) Oral two times a day  lisinopril 2.5 milliGRAM(s) Oral daily  melatonin 3 milliGRAM(s) Oral at bedtime  memantine 5 milliGRAM(s) Oral two times a day  metoprolol succinate ER 50 milliGRAM(s) Oral daily
CHIEF COMPLAINT: no new complaints, no acute events overnight. No Sz reported       HISTORY OF PRESENT ILLNESS    81 yo female with PMH of multiple strokes of unknown etiology ( ILR negative follows with Dr. Lawrence), carotid endarterectomy, dementia, seizures (multiple GTCs in 2012, was on keppra but taken off all AEDs), CAD, CABG on ASA 81 admitted to [facility] ED on 6/30 as transfer from [facility] ?seizures. As per family, patient has been confused and with visual hallucinations with staring spells days prior. Brought to ED after family noted left sided shaking. CTH 6/30 showed chronic infarcts. Given Ativan and transferred to [facility] for cEEG monitoring. MRI at [facility] showed Acute right occipital PCA infarct. Also, multiple chronic cerebral infarcts bilaterally. Chronic right cerebellar infarcts.  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 No further seizures after 7/6 reported. Discharged on Depakote and Vimpat. Additionally, hospital course complicated by EColi UTI. Iv rocephin x 5 days completed. Evaluated by PM&R. Pt is neurologically stable for discharge to rehab on 7/8.    Of note, attempted to wean off of supplemental O2 (awake O2 sat is > 95, asleep Sat dropped to 90% without signs of apnea),  should tolerate weaning supplemental oxygen per d/c plan. (08 Jul 2020 13:19)        PAST MEDICAL & SURGICAL HISTORY:  Dementia  Breast cancer  Peripheral vision loss  CAD (coronary artery disease)  HLD (hyperlipidemia)  Seizure  TIA (transient ischemic attack)  H/O lumpectomy: Left breast  S/P tonsillectomy  S/P carotid endarterectomy: right, 2006  S/P CABG x 5: 2006      VITALS  Vital Signs Last 24 Hrs  T(C): 36.6 (12 Jul 2020 21:20), Max: 36.6 (12 Jul 2020 21:20)  T(F): 97.8 (12 Jul 2020 21:20), Max: 97.8 (12 Jul 2020 21:20)  HR: 74 (13 Jul 2020 05:38) (74 - 81)  BP: 139/85 (13 Jul 2020 05:38) (125/73 - 173/85)  BP(mean): --  RR: 14 (13 Jul 2020 05:38) (14 - 16)  SpO2: 99% (13 Jul 2020 05:38) (97% - 99%)      PHYSICAL EXAM  Constitutional - NAD, Comfortable  HEENT - NCAT, EOMI  Neck - Supple, No limited ROM  Chest - CTA bilaterally  Cardiovascular - RRR, S1S2, No murmurs  Abdomen - BS+, Soft, NTND  Extremities - No C/C/E, No calf tenderness   Skin-no rash  Neurologic Exam - no new focal deficits                                Direct LDL: 53 mg/dL (07-01-20 @ 09:23)      Valproic Acid Level, Serum: 77 ug/mL (07-06-20 @ 06:50)            CURRENT MEDICATIONS  MEDICATIONS  (STANDING):  aspirin enteric coated 81 milliGRAM(s) Oral daily  atorvastatin 40 milliGRAM(s) Oral at bedtime  calcium carbonate 1250 mG  + Vitamin D (OsCal 500 + D) 1 Tablet(s) Oral daily  diVALproex Sprinkle 500 milliGRAM(s) Oral <User Schedule>  enoxaparin Injectable 40 milliGRAM(s) SubCutaneous daily  escitalopram 5 milliGRAM(s) Oral daily  lacosamide 100 milliGRAM(s) Oral two times a day  lisinopril 5 milliGRAM(s) Oral daily  memantine 5 milliGRAM(s) Oral two times a day  metoprolol succinate ER 50 milliGRAM(s) Oral daily
CHIEF COMPLAINT: slept better, offers no complaints tolerates therapy well       HISTORY OF PRESENT ILLNESS    83 yo female with PMH of multiple strokes of unknown etiology ( ILR negative follows with Dr. Lawrence), carotid endarterectomy, dementia, seizures (multiple GTCs in 2012, was on keppra but taken off all AEDs), CAD, CABG on ASA 81 admitted to [facility] ED on 6/30 as transfer from [facility] ?seizures. As per family, patient has been confused and with visual hallucinations with staring spells days prior. Brought to ED after family noted left sided shaking. CTH 6/30 showed chronic infarcts. Given Ativan and transferred to [facility] for cEEG monitoring. MRI at [facility] showed Acute right occipital PCA infarct. Also, multiple chronic cerebral infarcts bilaterally. Chronic right cerebellar infarcts.  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 No further seizures after 7/6 reported. Discharged on Depakote and Vimpat. Additionally, hospital course complicated by EColi UTI. Iv rocephin x 5 days completed. Evaluated by PM&R. Pt is neurologically stable for discharge to rehab on 7/8.    Of note, attempted to wean off of supplemental O2 (awake O2 sat is > 95, asleep Sat dropped to 90% without signs of apnea),  should tolerate weaning supplemental oxygen per d/c plan. (08 Jul 2020 13:19)        PAST MEDICAL & SURGICAL HISTORY:  Dementia  Breast cancer  Peripheral vision loss  CAD (coronary artery disease)  HLD (hyperlipidemia)  Seizure  TIA (transient ischemic attack)  H/O lumpectomy: Left breast  S/P tonsillectomy  S/P carotid endarterectomy: right, 2006  S/P CABG x 5: 2006        VITALS  Vital Signs Last 24 Hrs  T(C): 36.5 (17 Jul 2020 08:14), Max: 36.5 (16 Jul 2020 20:00)  T(F): 97.7 (17 Jul 2020 08:14), Max: 97.7 (16 Jul 2020 20:00)  HR: 65 (17 Jul 2020 08:14) (65 - 73)  BP: 123/79 (17 Jul 2020 08:14) (117/74 - 143/83)  BP(mean): --  RR: 15 (17 Jul 2020 08:14) (14 - 15)  SpO2: 96% (17 Jul 2020 08:14) (96% - 99%)      PHYSICAL EXAM  Constitutional - NAD, Comfortable  HEENT - NCAT, EOMI  Neck - Supple, No limited ROM  Chest - CTA bilaterally  Cardiovascular - RRR,  Abdomen - BS+, Soft, NTND  Extremities - No C/C/E  Skin-no rash  Neurologic Exam - no new focal deficits                         Direct LDL: 53 mg/dL (07-01-20 @ 09:23)      Valproic Acid Level, Serum: 77 ug/mL (07-06-20 @ 06:50)      CURRENT MEDICATIONS  MEDICATIONS  (STANDING):  aspirin enteric coated 81 milliGRAM(s) Oral daily  atorvastatin 40 milliGRAM(s) Oral at bedtime  calcium carbonate 1250 mG  + Vitamin D (OsCal 500 + D) 1 Tablet(s) Oral daily  diVALproex Sprinkle 500 milliGRAM(s) Oral <User Schedule>  enoxaparin Injectable 40 milliGRAM(s) SubCutaneous daily  escitalopram 5 milliGRAM(s) Oral daily  lacosamide 100 milliGRAM(s) Oral two times a day  lisinopril 2.5 milliGRAM(s) Oral daily  melatonin 3 milliGRAM(s) Oral at bedtime  memantine 5 milliGRAM(s) Oral two times a day  metoprolol succinate ER 50 milliGRAM(s) Oral daily    MEDICATIONS  (PRN):
CHIEF COMPLAINT: slept poorly last night, tired this afternoon       HISTORY OF PRESENT ILLNESS    81 yo female with PMH of multiple strokes of unknown etiology ( ILR negative follows with Dr. Lawernce), carotid endarterectomy, dementia, seizures (multiple GTCs in 2012, was on keppra but taken off all AEDs), CAD, CABG on ASA 81 admitted to [facility] ED on 6/30 as transfer from [facility] ?seizures. As per family, patient has been confused and with visual hallucinations with staring spells days prior. Brought to ED after family noted left sided shaking. CTH 6/30 showed chronic infarcts. Given Ativan and transferred to [facility] for cEEG monitoring. MRI at [facility] showed Acute right occipital PCA infarct. Also, multiple chronic cerebral infarcts bilaterally. Chronic right cerebellar infarcts.  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 No further seizures after 7/6 reported. Discharged on Depakote and Vimpat. Additionally, hospital course complicated by EColi UTI. Iv rocephin x 5 days completed. Evaluated by PM&R. Pt is neurologically stable for discharge to rehab on 7/8.    Of note, attempted to wean off of supplemental O2 (awake O2 sat is > 95, asleep Sat dropped to 90% without signs of apnea),  should tolerate weaning supplemental oxygen per d/c plan. (08 Jul 2020 13:19)        PAST MEDICAL & SURGICAL HISTORY:  Dementia  Breast cancer  Peripheral vision loss  CAD (coronary artery disease)  HLD (hyperlipidemia)  Seizure  TIA (transient ischemic attack)  H/O lumpectomy: Left breast  S/P tonsillectomy  S/P carotid endarterectomy: right, 2006  S/P CABG x 5: 2006        VITALS  Vital Signs Last 24 Hrs  T(C): 36.6 (13 Jul 2020 22:00), Max: 36.6 (13 Jul 2020 22:00)  T(F): 97.8 (13 Jul 2020 22:00), Max: 97.8 (13 Jul 2020 22:00)  HR: 68 (14 Jul 2020 08:02) (68 - 82)  BP: 96/56 (14 Jul 2020 08:02) (96/56 - 150/80)  BP(mean): --  RR: 14 (14 Jul 2020 06:05) (14 - 16)  SpO2: 95% (14 Jul 2020 06:05) (95% - 97%)      PHYSICAL EXAM  Constitutional - NAD, Comfortable  HEENT - NCAT, EOMI  Neck - Supple, No limited ROM  Chest - CTA bilaterally  Cardiovascular - RRR  Abdomen - BS+, Soft, NTND  Extremities - No calf tenderness   Skin-no rash  Neurologic Exam -  no new focal deficits                      RECENT LABS                        12.2   4.18  )-----------( 147      ( 14 Jul 2020 05:30 )             37.7     07-14    140  |  105  |  25<H>  ----------------------------<  82  4.2   |  20<L>  |  0.80    Ca    8.9      14 Jul 2020 05:30    TPro  6.4  /  Alb  3.0<L>  /  TBili  0.4  /  DBili  x   /  AST  40  /  ALT  32  /  AlkPhos  69  07-14      LIVER FUNCTIONS - ( 14 Jul 2020 05:30 )  Alb: 3.0 g/dL / Pro: 6.4 g/dL / ALK PHOS: 69 U/L / ALT: 32 U/L / AST: 40 U/L / GGT: x             Direct LDL: 53 mg/dL (07-01-20 @ 09:23)      Valproic Acid Level, Serum: 77 ug/mL (07-06-20 @ 06:50)          CURRENT MEDICATIONS  MEDICATIONS  (STANDING):  aspirin enteric coated 81 milliGRAM(s) Oral daily  atorvastatin 40 milliGRAM(s) Oral at bedtime  calcium carbonate 1250 mG  + Vitamin D (OsCal 500 + D) 1 Tablet(s) Oral daily  diVALproex Sprinkle 500 milliGRAM(s) Oral <User Schedule>  enoxaparin Injectable 40 milliGRAM(s) SubCutaneous daily  escitalopram 5 milliGRAM(s) Oral daily  lacosamide 100 milliGRAM(s) Oral two times a day  lisinopril 2.5 milliGRAM(s) Oral daily  memantine 5 milliGRAM(s) Oral two times a day  metoprolol succinate ER 50 milliGRAM(s) Oral daily    MEDICATIONS  (PRN):
Chief complaint: comfortable now, slept well after Seroquel dose last night     Patient is a 86y old  Female who presents with a chief complaint of s/p right PCa CVA with deficits (18 Jul 2020 13:48)    PAST MEDICAL & SURGICAL HISTORY:  Dementia  Breast cancer  Peripheral vision loss  CAD (coronary artery disease)  HLD (hyperlipidemia)  Seizure  TIA (transient ischemic attack)  H/O lumpectomy: Left breast  S/P tonsillectomy  S/P carotid endarterectomy: right, 2006  S/P CABG x 5: 2006    VITALS  Vital Signs Last 24 Hrs  T(C): 36.7 (18 Jul 2020 15:26), Max: 36.7 (18 Jul 2020 15:26)  T(F): 98 (18 Jul 2020 15:26), Max: 98 (18 Jul 2020 15:26)  HR: 72 (18 Jul 2020 15:26) (70 - 76)  BP: 112/66 (18 Jul 2020 15:26) (81/57 - 120/68)  BP(mean): --  RR: 15 (18 Jul 2020 15:26) (14 - 15)  SpO2: 94% (18 Jul 2020 15:26) (93% - 95%)      PHYSICAL EXAM  Constitutional - NAD,   HEENT - NCAT, EOMI  Neck - Supple  Chest - CTA   Cardiovascular - RRR  Abdomen - BS+, Soft, NTND  Extremities - No calf tenderness   Neurologic Exam -                    Cognitive - Awake, Alert     No new focal deficits               CURRENT MEDICATIONS    MEDICATIONS  (STANDING):  aspirin enteric coated 81 milliGRAM(s) Oral daily  atorvastatin 40 milliGRAM(s) Oral at bedtime  calcium carbonate 1250 mG  + Vitamin D (OsCal 500 + D) 1 Tablet(s) Oral daily  diVALproex Sprinkle 500 milliGRAM(s) Oral <User Schedule>  enoxaparin Injectable 40 milliGRAM(s) SubCutaneous daily  escitalopram 5 milliGRAM(s) Oral daily  lacosamide 100 milliGRAM(s) Oral two times a day  melatonin 3 milliGRAM(s) Oral at bedtime  memantine 5 milliGRAM(s) Oral two times a day  metoprolol succinate ER 50 milliGRAM(s) Oral daily    MEDICATIONS  (PRN):    ASSESSMENT & PLAN          GI/Bowel Management - Dulcolax PRN, Fleet PRN   Management - Toilet Q2  Skin - Turn Q2  Pain - Tylenol PRN  DVT PPX - Lovenox    Psychiatry evaluation requested     Continue comprehensive acute rehab program consisting of 3hrs/day of OT/PT and SLP.
Chief complaint: no acute events overnight     Patient is a 86y old  Female who presents with a chief complaint of s/p right PCa CVA with deficits (18 Jul 2020 16:38)              PAST MEDICAL & SURGICAL HISTORY:  Dementia  Breast cancer  Peripheral vision loss  CAD (coronary artery disease)  HLD (hyperlipidemia)  Seizure  TIA (transient ischemic attack)  H/O lumpectomy: Left breast  S/P tonsillectomy  S/P carotid endarterectomy: right, 2006  S/P CABG x 5: 2006      VITALS  Vital Signs Last 24 Hrs  T(C): 36.4 (19 Jul 2020 08:35), Max: 36.7 (18 Jul 2020 15:26)  T(F): 97.6 (19 Jul 2020 08:35), Max: 98 (18 Jul 2020 15:26)  HR: 62 (19 Jul 2020 08:35) (62 - 74)  BP: 105/71 (19 Jul 2020 08:35) (105/71 - 137/78)  BP(mean): --  RR: 15 (19 Jul 2020 08:35) (14 - 15)  SpO2: 100% (19 Jul 2020 08:35) (94% - 100%)      PHYSICAL EXAM  Constitutional - NAD, Comfortable  HEENT - NCAT, EOMI  Neck - Supple  Chest - CTA   Cardiovascular - RRR  Abdomen - Soft, NTND  Extremities -, No calf tenderness   Neurologic Exam -                    Cognitive - Awake, Alert     No new focal deficits                    RECENT LABS                        12.9   4.76  )-----------( 125      ( 19 Jul 2020 06:08 )             38.5     07-19    141  |  105  |  26<H>  ----------------------------<  78  4.1   |  25  |  0.93    Ca    8.9      19 Jul 2020 06:08    TPro  6.2  /  Alb  3.1<L>  /  TBili  0.5  /  DBili  x   /  AST  36  /  ALT  32  /  AlkPhos  61  07-19              CURRENT MEDICATIONS    MEDICATIONS  (STANDING):  aspirin enteric coated 81 milliGRAM(s) Oral daily  atorvastatin 40 milliGRAM(s) Oral at bedtime  calcium carbonate 1250 mG  + Vitamin D (OsCal 500 + D) 1 Tablet(s) Oral daily  diVALproex Sprinkle 500 milliGRAM(s) Oral <User Schedule>  enoxaparin Injectable 40 milliGRAM(s) SubCutaneous daily  escitalopram 5 milliGRAM(s) Oral daily  lacosamide 100 milliGRAM(s) Oral two times a day  melatonin 3 milliGRAM(s) Oral at bedtime  memantine 5 milliGRAM(s) Oral two times a day  metoprolol succinate ER 50 milliGRAM(s) Oral daily    MEDICATIONS  (PRN):    ASSESSMENT & PLAN          GI/Bowel Management - Dulcolax PRN, Fleet PRN   Management - Toilet Q2  Skin - Turn Q2  Pain - Tylenol PRN  DVT PPX - Lovenox      Continue comprehensive acute rehab program consisting of 3hrs/day of OT/PT and SLP.
HPI  Pt is 85yo F admitted to acute rehab for CVA.   pt was seen and examined at bedside. Pt states no over night complaints.      Vital Signs Last 24 Hrs  T(C): 36.3 (12 Jul 2020 07:30), Max: 36.5 (11 Jul 2020 19:22)  T(F): 97.3 (12 Jul 2020 07:30), Max: 97.7 (11 Jul 2020 19:22)  HR: 73 (12 Jul 2020 07:30) (71 - 82)  BP: 124/78 (12 Jul 2020 07:30) (119/71 - 156/81)  BP(mean): --  RR: 17 (12 Jul 2020 07:30) (14 - 17)  SpO2: 93% (12 Jul 2020 07:30) (93% - 100%)    I&O's Summary    11 Jul 2020 07:01  -  12 Jul 2020 07:00  --------------------------------------------------------  IN: 640 mL / OUT: 0 mL / NET: 640 mL        CAPILLARY BLOOD GLUCOSE          PHYSICAL EXAM:  Constitutional - Frail elderly female, NAD, Comfortable in chair  	HEENT - NCAT, EOMI  	Neck - Supple, No limited ROM  	Chest - CTA bilaterally  	Cardiovascular - RR  	Abdomen - BS+, Soft, NTND  	Extremities - No C/C/E, No calf tenderness   	Skin-no rash  	Wounds-none    MEDICATIONS:  MEDICATIONS  (STANDING):  aspirin enteric coated 81 milliGRAM(s) Oral daily  atorvastatin 40 milliGRAM(s) Oral at bedtime  calcium carbonate 1250 mG  + Vitamin D (OsCal 500 + D) 1 Tablet(s) Oral daily  diVALproex Sprinkle 500 milliGRAM(s) Oral <User Schedule>  enoxaparin Injectable 40 milliGRAM(s) SubCutaneous daily  escitalopram 5 milliGRAM(s) Oral daily  lacosamide 100 milliGRAM(s) Oral two times a day  memantine 5 milliGRAM(s) Oral two times a day  metoprolol succinate ER 50 milliGRAM(s) Oral daily      LABS: All Labs Reviewed:                Blood Culture:     RADIOLOGY/EKG:    DVT PPX:    ADVANCED DIRECTIVE:    DISPOSITION:
HPI:  81 yo female with PMH of multiple strokes of unknown etiology ( ILR negative follows with Dr. Lawrence), carotid endarterectomy, dementia, seizures (multiple GTCs in 2012, was on keppra but taken off all AEDs), CAD, CABG on ASA 81 admitted to [facility] ED on 6/30 as transfer from [facility] ?seizures. As per family, patient has been confused and with visual hallucinations with staring spells days prior. Brought to ED after family noted left sided shaking. CTH 6/30 showed chronic infarcts. Given Ativan and transferred to [facility] for cEEG monitoring. MRI at [facility] showed Acute right occipital PCA infarct. Also, multiple chronic cerebral infarcts bilaterally. Chronic right cerebellar infarcts.  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 No further seizures after 7/6 reported. Discharged on Depakote and Vimpat. Additionally, hospital course complicated by EColi UTI. Iv rocephin x 5 days completed. Evaluated by PM&R. Pt is neurologically stable for discharge to rehab on 7/8.    Of note, attempted to wean off of supplemental O2 (awake O2 sat is > 95, asleep Sat dropped to 90% without signs of apnea),  should tolerate weaning supplemental oxygen per d/c plan. (08 Jul 2020 13:19)      Subjective    No agitation last night.         PAST MEDICAL & SURGICAL HISTORY:  Dementia  Breast cancer  Peripheral vision loss  CAD (coronary artery disease)  HLD (hyperlipidemia)  Seizure  TIA (transient ischemic attack)  H/O lumpectomy: Left breast  S/P tonsillectomy  S/P carotid endarterectomy: right, 2006  S/P CABG x 5: 2006      MedsMEDICATIONS  (STANDING):  aspirin enteric coated 81 milliGRAM(s) Oral daily  atorvastatin 40 milliGRAM(s) Oral at bedtime  calcium carbonate 1250 mG  + Vitamin D (OsCal 500 + D) 1 Tablet(s) Oral daily  diVALproex Sprinkle 500 milliGRAM(s) Oral <User Schedule>  enoxaparin Injectable 40 milliGRAM(s) SubCutaneous daily  escitalopram 5 milliGRAM(s) Oral daily  lacosamide 100 milliGRAM(s) Oral two times a day  melatonin 3 milliGRAM(s) Oral at bedtime  memantine 5 milliGRAM(s) Oral two times a day  metoprolol succinate ER 50 milliGRAM(s) Oral daily    MEDICATIONS  (PRN):      Vital Signs Last 24 Hrs  T(C): 36.4 (19 Jul 2020 08:35), Max: 36.7 (18 Jul 2020 15:26)  T(F): 97.6 (19 Jul 2020 08:35), Max: 98 (18 Jul 2020 15:26)  HR: 62 (19 Jul 2020 08:35) (62 - 74)  BP: 105/71 (19 Jul 2020 08:35) (105/71 - 137/78)  BP(mean): --  RR: 15 (19 Jul 2020 08:35) (14 - 15)  SpO2: 100% (19 Jul 2020 08:35) (94% - 100%)  I&O's Summary    18 Jul 2020 07:01  -  19 Jul 2020 07:00  --------------------------------------------------------  IN: 0 mL / OUT: 200 mL / NET: -200 mL        PHYSICAL EXAM:  GENERAL: NAD  NECK: Supple  NERVOUS SYSTEM:  awake and alert  HEART: S1s2 NL , RRR  CHEST/LUNG: Clear to percussion bilaterally  ABDOMEN: Soft, Nontender, Nondistended; Bowel sounds present  EXTREMITIES:  No edema      LABS:(07-19 @ 06:08)                      12.9  4.76 )-----------( 125                 38.5    Neutrophils = -- (--%)  Lymphocytes = -- (--%)  Eosinophils = -- (--%)  Basophils = -- (--%)  Monocytes = -- (--%)  Bands = --%    07-19    141  |  105  |  26<H>  ----------------------------<  78  4.1   |  25  |  0.93    Ca    8.9      19 Jul 2020 06:08    TPro  6.2  /  Alb  3.1<L>  /  TBili  0.5  /  DBili  x   /  AST  36  /  ALT  32  /  AlkPhos  61  07-19      CVA  PT/OT per rehab  ASA/Statin    Dementia with hyperactive delirium  Namenda  ? trial of Zyprexa 2.5mg prn if agitated     HTN  BP better without lisinopril  Cont BB    Thrombocytopenia  not new  monitor for now    DVT ppx  Lovenox
HPI:  81 yo female with PMH of multiple strokes of unknown etiology ( ILR negative follows with Dr. Lawrence), carotid endarterectomy, dementia, seizures (multiple GTCs in 2012, was on keppra but taken off all AEDs), CAD, CABG on ASA 81 admitted to [facility] ED on 6/30 as transfer from [facility] ?seizures. As per family, patient has been confused and with visual hallucinations with staring spells days prior. Brought to ED after family noted left sided shaking. CTH 6/30 showed chronic infarcts. Given Ativan and transferred to [facility] for cEEG monitoring. MRI at [facility] showed Acute right occipital PCA infarct. Also, multiple chronic cerebral infarcts bilaterally. Chronic right cerebellar infarcts.  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 No further seizures after 7/6 reported. Discharged on Depakote and Vimpat. Additionally, hospital course complicated by EColi UTI. Iv rocephin x 5 days completed. Evaluated by PM&R. Pt is neurologically stable for discharge to rehab on 7/8.    Of note, attempted to wean off of supplemental O2 (awake O2 sat is > 95, asleep Sat dropped to 90% without signs of apnea),  should tolerate weaning supplemental oxygen per d/c plan. (08 Jul 2020 13:19)      Subjective  Received seroquel last night and more sleepy this AM. Now pt more awake and alert but still confused and yelling "Indira" repeatedly.         PAST MEDICAL & SURGICAL HISTORY:  Dementia  Breast cancer  Peripheral vision loss  CAD (coronary artery disease)  HLD (hyperlipidemia)  Seizure  TIA (transient ischemic attack)  H/O lumpectomy: Left breast  S/P tonsillectomy  S/P carotid endarterectomy: right, 2006  S/P CABG x 5: 2006      MedsMEDICATIONS  (STANDING):  aspirin enteric coated 81 milliGRAM(s) Oral daily  atorvastatin 40 milliGRAM(s) Oral at bedtime  calcium carbonate 1250 mG  + Vitamin D (OsCal 500 + D) 1 Tablet(s) Oral daily  diVALproex Sprinkle 500 milliGRAM(s) Oral <User Schedule>  enoxaparin Injectable 40 milliGRAM(s) SubCutaneous daily  escitalopram 5 milliGRAM(s) Oral daily  lacosamide 100 milliGRAM(s) Oral two times a day  lisinopril 2.5 milliGRAM(s) Oral daily  melatonin 3 milliGRAM(s) Oral at bedtime  memantine 5 milliGRAM(s) Oral two times a day  metoprolol succinate ER 50 milliGRAM(s) Oral daily    MEDICATIONS  (PRN):      Vital Signs Last 24 Hrs  T(C): 36.6 (18 Jul 2020 08:31), Max: 36.6 (17 Jul 2020 21:27)  T(F): 97.8 (18 Jul 2020 08:31), Max: 97.9 (17 Jul 2020 21:27)  HR: 72 (18 Jul 2020 08:31) (70 - 76)  BP: 81/57 (18 Jul 2020 08:31) (81/57 - 120/68)  BP(mean): --  RR: 14 (18 Jul 2020 08:31) (14 - 15)  SpO2: 93% (18 Jul 2020 08:31) (93% - 95%)  I&O's Summary    17 Jul 2020 07:01  -  18 Jul 2020 07:00  --------------------------------------------------------  IN: 240 mL / OUT: 0 mL / NET: 240 mL        PHYSICAL EXAM:  GENERAL: NAD  NECK: Supple  NERVOUS SYSTEM:  awake and alert  HEART: S1s2 NL , RRR  CHEST/LUNG: Clear to percussion bilaterally  ABDOMEN: Soft, Nontender, Nondistended; Bowel sounds present  EXTREMITIES:  No edema      Imaging Personally Reviewed:  [ ] YES  [ ] NO        Care Discussed with Consultants/Other Providers [ x] YES  [ ] NO    CVA  PT/OT per rehab  ASA/Statin    Dementia with hyperactive delirium  Namenda  ? trial of Zyprexa 2.5mg prn    HTN  SBP 81  DC lisinopril    DVT ppx  Lovenox
Patient is a 86y old  Female who presents with a chief complaint of s/p right PCa CVA with deficits (09 Jul 2020 12:35)      Patient seen and examined at bedside. Complains of feeling tired today. Denies headache, dizziness, weakness, nausea, vomiting.    ALLERGIES:  lidocaine (Other)    MEDICATIONS  (STANDING):  aspirin enteric coated 81 milliGRAM(s) Oral daily  atorvastatin 40 milliGRAM(s) Oral at bedtime  calcium carbonate 1250 mG  + Vitamin D (OsCal 500 + D) 1 Tablet(s) Oral daily  diVALproex Sprinkle 500 milliGRAM(s) Oral <User Schedule>  enoxaparin Injectable 40 milliGRAM(s) SubCutaneous daily  escitalopram 5 milliGRAM(s) Oral daily  lacosamide 100 milliGRAM(s) Oral two times a day  memantine 5 milliGRAM(s) Oral two times a day  metoprolol succinate ER 50 milliGRAM(s) Oral daily    MEDICATIONS  (PRN):    Vital Signs Last 24 Hrs  T(F): 97.7 (10 Jul 2020 07:24), Max: 97.9 (09 Jul 2020 20:36)  HR: 75 (10 Jul 2020 07:24) (75 - 80)  BP: 139/82 (10 Jul 2020 07:24) (122/74 - 149/84)  RR: 14 (10 Jul 2020 07:24) (14 - 15)  SpO2: 96% (10 Jul 2020 07:24) (95% - 99%)  I&O's Summary    BMI (kg/m2): 25.8 (07-08-20 @ 15:00)  PHYSICAL EXAM:  GENERAL: Sleepy  HEAD:  Atraumatic, Normocephalic  EYES: EOMI, PERRLA, conjunctiva and sclera clear  ENMT: No tonsillar erythema, exudates, or enlargement; Moist mucous membranes, Good dentition, No lesions  NECK: Supple, No JVD, Normal thyroid  NERVOUS SYSTEM:  Alert & Oriented X 2  CHEST/LUNG: Clear to percussion bilaterally; No rales, rhonchi, wheezing, or rubs  HEART: Regular rate and rhythm; No murmurs, rubs, or gallops  ABDOMEN: Soft, Nontender, Nondistended; Bowel sounds present  EXTREMITIES:  2+ Peripheral Pulses, No clubbing, cyanosis, or edema  LYMPH: No lymphadenopathy noted  SKIN: No rashes or lesions      LABS:                        12.7   4.49  )-----------( 157      ( 09 Jul 2020 07:40 )             39.0       07-09    141  |  104  |  21  ----------------------------<  102  4.1   |  29  |  0.81    Ca    9.0      09 Jul 2020 07:40    TPro  6.8  /  Alb  2.9  /  TBili  0.5  /  DBili  x   /  AST  38  /  ALT  46  /  AlkPhos  87  07-09     eGFR if Non African American: 66 mL/min/1.73M2 (07-09-20 @ 07:40)  eGFR if : 76 mL/min/1.73M2 (07-09-20 @ 07:40)             07-01 Chol 115 mg/dL LDL 53 mg/dL HDL 47 mg/dL Trig 75 mg/dL                      Culture - Urine (collected 04 Jul 2020 01:15)  Source: .Urine Clean Catch (Midstream)  Final Report (05 Jul 2020 21:57):    >100,000 CFU/ml Escherichia coli  Organism: Escherichia coli (05 Jul 2020 21:57)  Organism: Escherichia coli (05 Jul 2020 21:57)      -  Amikacin: S <=16      -  Ampicillin: S <=8 These ampicillin results predict results for amoxicillin      -  Ampicillin/Sulbactam: S <=8/4 Enterobacter, Citrobacter, and Serratia may develop resistance during prolonged therapy (3-4 days)      -  Aztreonam: S <=4      -  Cefazolin: S <=8 (MIC_CL_[id number]) For uncomplicated UTI with K. pneumoniae, E. coli, or P. mirablis: KEVIN <=16 is sensitive and KEVIN >=32 is resistant. This also predicts results for oral agents cefaclor, cefdinir, cefpodoxime, cefprozil, cefuroxime axetil, cephalexin and locarbef for uncomplicated UTI. Note that some isolates may be susceptible to these agents while testing resistant to cefazolin.      -  Cefepime: S <=4      -  Cefoxitin: S <=8      -  Ceftriaxone: S <=1 Enterobacter, Citrobacter, and Serratia may develop resistance during prolonged therapy      -  Ciprofloxacin: S <=1      -  Gentamicin: S <=4      -  Imipenem: S <=1      -  Levofloxacin: S <=2      -  Meropenem: S <=1      -  Nitrofurantoin: S <=32 Should not be used to treat pyelonephritis      -  Piperacillin/Tazobactam: S <=16      -  Tigecycline: S <=2      -  Tobramycin: S <=4      -  Trimethoprim/Sulfamethoxazole: S <=2/38      Method Type: KEVIN        RADIOLOGY & ADDITIONAL TESTS:    Care Discussed with Consultants/Other Providers:
Patient is a 86y old  Female who presents with a chief complaint of s/p right PCa CVA with deficits (12 Jul 2020 11:25)      Patient seen and examined at bedside. No complaint today. Feels well, denies headache, dizziness, nausea, abdominal pain, diarrhea, or constipation.    ALLERGIES:  lidocaine (Other)    MEDICATIONS  (STANDING):  aspirin enteric coated 81 milliGRAM(s) Oral daily  atorvastatin 40 milliGRAM(s) Oral at bedtime  calcium carbonate 1250 mG  + Vitamin D (OsCal 500 + D) 1 Tablet(s) Oral daily  diVALproex Sprinkle 500 milliGRAM(s) Oral <User Schedule>  enoxaparin Injectable 40 milliGRAM(s) SubCutaneous daily  escitalopram 5 milliGRAM(s) Oral daily  lacosamide 100 milliGRAM(s) Oral two times a day  memantine 5 milliGRAM(s) Oral two times a day  metoprolol succinate ER 50 milliGRAM(s) Oral daily    MEDICATIONS  (PRN):    Vital Signs Last 24 Hrs  T(F): 97.8 (12 Jul 2020 21:20), Max: 97.8 (12 Jul 2020 21:20)  HR: 74 (13 Jul 2020 05:38) (74 - 81)  BP: 139/85 (13 Jul 2020 05:38) (125/73 - 173/85)  RR: 14 (13 Jul 2020 05:38) (14 - 16)  SpO2: 99% (13 Jul 2020 05:38) (97% - 99%)  I&O's Summary    BMI (kg/m2): 25.8 (07-08-20 @ 15:00)  PHYSICAL EXAM:  GENERAL: NAD  HEAD:  Atraumatic, Normocephalic  EYES: EOMI, PERRLA, conjunctiva and sclera clear  ENMT: No tonsillar erythema, exudates, or enlargement; Moist mucous membranes, Good dentition, No lesions  NECK: Supple, No JVD, Normal thyroid  NERVOUS SYSTEM:  Alert & Oriented X 2  CHEST/LUNG: Clear to percussion bilaterally; No rales, rhonchi, wheezing, or rubs  HEART: Regular rate and rhythm; No murmurs, rubs, or gallops  ABDOMEN: Soft, Nontender, Nondistended; Bowel sounds present  EXTREMITIES:  2+ Peripheral Pulses, No clubbing, cyanosis, or edema  LYMPH: No lymphadenopathy noted  SKIN: No rashes or lesions    LABS:                        07-01 Chol 115 mg/dL LDL 53 mg/dL HDL 47 mg/dL Trig 75 mg/dL                        RADIOLOGY & ADDITIONAL TESTS:    Care Discussed with Consultants/Other Providers:
Patient is a 86y old  Female who presents with a chief complaint of s/p right PCa CVA with deficits (13 Jul 2020 16:57)      Patient seen and examined at bedside. Feels sleepy, has no complaints. Denied dizziness, headache, cough, shortness of breath, nausea, or vomiting. She was hypotensive in PT later in the morning with BP around 90/60.    ALLERGIES:  lidocaine (Other)    MEDICATIONS  (STANDING):  aspirin enteric coated 81 milliGRAM(s) Oral daily  atorvastatin 40 milliGRAM(s) Oral at bedtime  calcium carbonate 1250 mG  + Vitamin D (OsCal 500 + D) 1 Tablet(s) Oral daily  diVALproex Sprinkle 500 milliGRAM(s) Oral <User Schedule>  enoxaparin Injectable 40 milliGRAM(s) SubCutaneous daily  escitalopram 5 milliGRAM(s) Oral daily  lacosamide 100 milliGRAM(s) Oral two times a day  lisinopril 2.5 milliGRAM(s) Oral daily  memantine 5 milliGRAM(s) Oral two times a day  metoprolol succinate ER 50 milliGRAM(s) Oral daily    MEDICATIONS  (PRN):    Vital Signs Last 24 Hrs  T(F): 97.8 (13 Jul 2020 22:00), Max: 97.8 (13 Jul 2020 22:00)  HR: 68 (14 Jul 2020 08:02) (68 - 82)  BP: 96/56 (14 Jul 2020 08:02) (96/56 - 150/80)  RR: 14 (14 Jul 2020 06:05) (14 - 16)  SpO2: 95% (14 Jul 2020 06:05) (95% - 97%)  I&O's Summary      PHYSICAL EXAM:  GENERAL: NAD  HEAD:  Atraumatic, Normocephalic  EYES: EOMI, PERRLA, conjunctiva and sclera clear  ENMT: No tonsillar erythema, exudates, or enlargement; Moist mucous membranes, Good dentition, No lesions  NECK: Supple, No JVD, Normal thyroid  NERVOUS SYSTEM:  Alert & Oriented X 2  CHEST/LUNG: Clear to percussion bilaterally; No rales, rhonchi, wheezing, or rubs  HEART: Regular rate and rhythm; No murmurs, rubs, or gallops  ABDOMEN: Soft, Nontender, Nondistended; Bowel sounds present  EXTREMITIES:  2+ Peripheral Pulses, No clubbing, cyanosis, or edema  LYMPH: No lymphadenopathy noted  SKIN: No rashes or lesions    LABS:                        12.2   4.18  )-----------( 147      ( 14 Jul 2020 05:30 )             37.7       07-14    140  |  105  |  25  ----------------------------<  82  4.2   |  20  |  0.80    Ca    8.9      14 Jul 2020 05:30    TPro  6.4  /  Alb  3.0  /  TBili  0.4  /  DBili  x   /  AST  40  /  ALT  32  /  AlkPhos  69  07-14     eGFR if Non African American: 66 mL/min/1.73M2 (07-14-20 @ 05:30)  eGFR if African American: 77 mL/min/1.73M2 (07-14-20 @ 05:30)             07-01 Chol 115 mg/dL LDL 53 mg/dL HDL 47 mg/dL Trig 75 mg/dL                        RADIOLOGY & ADDITIONAL TESTS:    Care Discussed with Consultants/Other Providers:
Patient is a 86y old  Female who presents with a chief complaint of s/p right PCa CVA with deficits (14 Jul 2020 18:31)      Patient seen and examined at bedside. Denies any complaints today, working well with physical therapy. Denies headache, dizziness, weakness, nausea, vomiting.    ALLERGIES:  lidocaine (Other)    MEDICATIONS  (STANDING):  aspirin enteric coated 81 milliGRAM(s) Oral daily  atorvastatin 40 milliGRAM(s) Oral at bedtime  calcium carbonate 1250 mG  + Vitamin D (OsCal 500 + D) 1 Tablet(s) Oral daily  diVALproex Sprinkle 500 milliGRAM(s) Oral <User Schedule>  enoxaparin Injectable 40 milliGRAM(s) SubCutaneous daily  escitalopram 5 milliGRAM(s) Oral daily  lacosamide 100 milliGRAM(s) Oral two times a day  lisinopril 2.5 milliGRAM(s) Oral daily  melatonin 3 milliGRAM(s) Oral at bedtime  memantine 5 milliGRAM(s) Oral two times a day  metoprolol succinate ER 50 milliGRAM(s) Oral daily    MEDICATIONS  (PRN):    Vital Signs Last 24 Hrs  T(F): 97.3 (15 Jul 2020 05:26), Max: 97.9 (14 Jul 2020 21:54)  HR: 72 (15 Jul 2020 05:26) (72 - 77)  BP: 144/82 (15 Jul 2020 05:26) (121/76 - 144/82)  RR: 16 (15 Jul 2020 05:26) (16 - 16)  SpO2: 95% (15 Jul 2020 05:26) (95% - 97%)  I&O's Summary    14 Jul 2020 07:01  -  15 Jul 2020 07:00  --------------------------------------------------------  IN: 240 mL / OUT: 0 mL / NET: 240 mL        PHYSICAL EXAM:  GENERAL: NAD  HEAD:  Atraumatic, Normocephalic  EYES: EOMI, PERRLA, conjunctiva and sclera clear  ENMT: No tonsillar erythema, exudates, or enlargement; Moist mucous membranes, Good dentition, No lesions  NECK: Supple, No JVD, Normal thyroid  NERVOUS SYSTEM:  Alert & Oriented X 2  CHEST/LUNG: Clear to percussion bilaterally; No rales, rhonchi, wheezing, or rubs  HEART: Regular rate and rhythm; No murmurs, rubs, or gallops  ABDOMEN: Soft, Nontender, Nondistended; Bowel sounds present  EXTREMITIES:  2+ Peripheral Pulses, No clubbing, cyanosis, or edema  LYMPH: No lymphadenopathy noted  SKIN: No rashes or lesions      LABS:                        12.2   4.18  )-----------( 147      ( 14 Jul 2020 05:30 )             37.7       07-14    140  |  105  |  25  ----------------------------<  82  4.2   |  20  |  0.80    Ca    8.9      14 Jul 2020 05:30    TPro  6.4  /  Alb  3.0  /  TBili  0.4  /  DBili  x   /  AST  40  /  ALT  32  /  AlkPhos  69  07-14     eGFR if Non African American: 66 mL/min/1.73M2 (07-14-20 @ 05:30)  eGFR if African American: 77 mL/min/1.73M2 (07-14-20 @ 05:30)             07-01 Chol 115 mg/dL LDL 53 mg/dL HDL 47 mg/dL Trig 75 mg/dL                        RADIOLOGY & ADDITIONAL TESTS:    Care Discussed with Consultants/Other Providers:
Patient is a 86y old  Female who presents with a chief complaint of s/p right PCa CVA with deficits (15 Jul 2020 18:47)      Patient seen and examined at bedside. Denies any complaints today. Denies headache, dizziness, weakness, cough. PT reports shuffling gait during treatment.    ALLERGIES:  lidocaine (Other)    MEDICATIONS  (STANDING):  aspirin enteric coated 81 milliGRAM(s) Oral daily  atorvastatin 40 milliGRAM(s) Oral at bedtime  calcium carbonate 1250 mG  + Vitamin D (OsCal 500 + D) 1 Tablet(s) Oral daily  diVALproex Sprinkle 500 milliGRAM(s) Oral <User Schedule>  enoxaparin Injectable 40 milliGRAM(s) SubCutaneous daily  escitalopram 5 milliGRAM(s) Oral daily  lacosamide 100 milliGRAM(s) Oral two times a day  lisinopril 2.5 milliGRAM(s) Oral daily  melatonin 3 milliGRAM(s) Oral at bedtime  memantine 5 milliGRAM(s) Oral two times a day  metoprolol succinate ER 50 milliGRAM(s) Oral daily    MEDICATIONS  (PRN):    Vital Signs Last 24 Hrs  T(F): 98 (16 Jul 2020 08:40), Max: 98.4 (15 Jul 2020 20:10)  HR: 75 (16 Jul 2020 08:40) (70 - 77)  BP: 126/75 (16 Jul 2020 08:40) (116/66 - 126/75)  RR: 15 (16 Jul 2020 08:40) (14 - 15)  SpO2: 100% (16 Jul 2020 08:40) (97% - 100%)  I&O's Summary      PHYSICAL EXAM:  GENERAL: NAD  HEAD:  Atraumatic, Normocephalic  EYES: EOMI, PERRLA, conjunctiva and sclera clear  ENMT: No tonsillar erythema, exudates, or enlargement; Moist mucous membranes, Good dentition, No lesions  NECK: Supple, No JVD, Normal thyroid  NERVOUS SYSTEM:  Alert & Oriented X 2  CHEST/LUNG: Clear to percussion bilaterally; No rales, rhonchi, wheezing, or rubs  HEART: Regular rate and rhythm; No murmurs, rubs, or gallops  ABDOMEN: Soft, Nontender, Nondistended; Bowel sounds present  EXTREMITIES:  2+ Peripheral Pulses, No clubbing, cyanosis, or edema  LYMPH: No lymphadenopathy noted  SKIN: No rashes or lesions  LABS:                        12.2   4.18  )-----------( 147      ( 14 Jul 2020 05:30 )             37.7       07-14    140  |  105  |  25  ----------------------------<  82  4.2   |  20  |  0.80    Ca    8.9      14 Jul 2020 05:30    TPro  6.4  /  Alb  3.0  /  TBili  0.4  /  DBili  x   /  AST  40  /  ALT  32  /  AlkPhos  69  07-14     eGFR if Non African American: 66 mL/min/1.73M2 (07-14-20 @ 05:30)  eGFR if African American: 77 mL/min/1.73M2 (07-14-20 @ 05:30)             07-01 Chol 115 mg/dL LDL 53 mg/dL HDL 47 mg/dL Trig 75 mg/dL                        RADIOLOGY & ADDITIONAL TESTS:    Care Discussed with Consultants/Other Providers:
Patient is a 86y old  Female who presents with a chief complaint of s/p right PCa CVA with deficits (16 Jul 2020 19:35)      Patient seen and examined at bedside. No overnight events. She is tired today. Denies headache, dizziness, weakness.    ALLERGIES:  lidocaine (Other)    MEDICATIONS  (STANDING):  aspirin enteric coated 81 milliGRAM(s) Oral daily  atorvastatin 40 milliGRAM(s) Oral at bedtime  calcium carbonate 1250 mG  + Vitamin D (OsCal 500 + D) 1 Tablet(s) Oral daily  diVALproex Sprinkle 500 milliGRAM(s) Oral <User Schedule>  enoxaparin Injectable 40 milliGRAM(s) SubCutaneous daily  escitalopram 5 milliGRAM(s) Oral daily  lacosamide 100 milliGRAM(s) Oral two times a day  lisinopril 2.5 milliGRAM(s) Oral daily  melatonin 3 milliGRAM(s) Oral at bedtime  memantine 5 milliGRAM(s) Oral two times a day  metoprolol succinate ER 50 milliGRAM(s) Oral daily    MEDICATIONS  (PRN):    Vital Signs Last 24 Hrs  T(F): 97.7 (17 Jul 2020 08:14), Max: 97.7 (16 Jul 2020 20:00)  HR: 65 (17 Jul 2020 08:14) (65 - 73)  BP: 123/79 (17 Jul 2020 08:14) (117/74 - 143/83)  RR: 15 (17 Jul 2020 08:14) (14 - 15)  SpO2: 96% (17 Jul 2020 08:14) (96% - 99%)  I&O's Summary    16 Jul 2020 07:01  -  17 Jul 2020 07:00  --------------------------------------------------------  IN: 100 mL / OUT: 0 mL / NET: 100 mL        PHYSICAL EXAM:  GENERAL: NAD  HEAD:  Atraumatic, Normocephalic  EYES: EOMI, PERRLA, conjunctiva and sclera clear  ENMT: No tonsillar erythema, exudates, or enlargement; Moist mucous membranes, Good dentition, No lesions  NECK: Supple, No JVD, Normal thyroid  NERVOUS SYSTEM:  Alert & Oriented X 2  CHEST/LUNG: Clear to percussion bilaterally; No rales, rhonchi, wheezing, or rubs  HEART: Regular rate and rhythm; No murmurs, rubs, or gallops  ABDOMEN: Soft, Nontender, Nondistended; Bowel sounds present  EXTREMITIES:  2+ Peripheral Pulses, No clubbing, cyanosis, or edema  LYMPH: No lymphadenopathy noted  SKIN: No rashes or lesions  LABS:                        07-01 Chol 115 mg/dL LDL 53 mg/dL HDL 47 mg/dL Trig 75 mg/dL                        RADIOLOGY & ADDITIONAL TESTS:    Care Discussed with Consultants/Other Providers:
Patient is a 86y old  Female who presents with a chief complaint of s/p right PCa CVA with deficits (19 Jul 2020 12:32)      Patient seen and examined at bedside. No complaints today. Denies headache, dizziness, weakness, cough, shortness of breath.    ALLERGIES:  lidocaine (Other)    MEDICATIONS  (STANDING):  aspirin enteric coated 81 milliGRAM(s) Oral daily  atorvastatin 40 milliGRAM(s) Oral at bedtime  calcium carbonate 1250 mG  + Vitamin D (OsCal 500 + D) 1 Tablet(s) Oral daily  diVALproex Sprinkle 500 milliGRAM(s) Oral <User Schedule>  enoxaparin Injectable 40 milliGRAM(s) SubCutaneous daily  escitalopram 5 milliGRAM(s) Oral daily  lacosamide 100 milliGRAM(s) Oral two times a day  melatonin 3 milliGRAM(s) Oral at bedtime  memantine 5 milliGRAM(s) Oral two times a day  metoprolol succinate ER 50 milliGRAM(s) Oral daily    MEDICATIONS  (PRN):    Vital Signs Last 24 Hrs  T(F): 98.3 (19 Jul 2020 20:17), Max: 98.3 (19 Jul 2020 20:17)  HR: 70 (20 Jul 2020 05:40) (70 - 76)  BP: 119/74 (20 Jul 2020 05:40) (119/74 - 145/81)  RR: 14 (20 Jul 2020 05:40) (14 - 14)  SpO2: 99% (20 Jul 2020 05:40) (99% - 99%)  I&O's Summary      PHYSICAL EXAM:  GENERAL: NAD, sleepy  HEAD:  Atraumatic, Normocephalic  EYES: EOMI, PERRLA, conjunctiva and sclera clear  ENMT: No tonsillar erythema, exudates, or enlargement; Moist mucous membranes, Good dentition, No lesions  NECK: Supple, No JVD, Normal thyroid  NERVOUS SYSTEM:  Alert & Oriented X 2  CHEST/LUNG: Clear to percussion bilaterally; No rales, rhonchi, wheezing, or rubs  HEART: Regular rate and rhythm; No murmurs, rubs, or gallops  ABDOMEN: Soft, Nontender, Nondistended; Bowel sounds present  EXTREMITIES:  2+ Peripheral Pulses, No clubbing, cyanosis, or edema  LYMPH: No lymphadenopathy noted  SKIN: No rashes or lesions    LABS:                        12.9   4.76  )-----------( 125      ( 19 Jul 2020 06:08 )             38.5       07-19    141  |  105  |  26  ----------------------------<  78  4.1   |  25  |  0.93    Ca    8.9      19 Jul 2020 06:08    TPro  6.2  /  Alb  3.1  /  TBili  0.5  /  DBili  x   /  AST  36  /  ALT  32  /  AlkPhos  61  07-19     eGFR if Non African American: 56 mL/min/1.73M2 (07-19-20 @ 06:08)  eGFR if : 65 mL/min/1.73M2 (07-19-20 @ 06:08)             07-01 Chol 115 mg/dL LDL 53 mg/dL HDL 47 mg/dL Trig 75 mg/dL                        RADIOLOGY & ADDITIONAL TESTS:    Care Discussed with Consultants/Other Providers:
Patient is a 86y old  Female who presents with a chief complaint of s/p right PCa CVA with deficits (20 Jul 2020 13:04)      Patient seen and examined at bedside. Sleepy this morning, denied any complaints. Later in the day, she was aggitated, asking for her  and screaming.    ALLERGIES:  lidocaine (Other)    MEDICATIONS  (STANDING):  aspirin enteric coated 81 milliGRAM(s) Oral daily  atorvastatin 40 milliGRAM(s) Oral at bedtime  calcium carbonate 1250 mG  + Vitamin D (OsCal 500 + D) 1 Tablet(s) Oral daily  diVALproex Sprinkle 500 milliGRAM(s) Oral <User Schedule>  enoxaparin Injectable 40 milliGRAM(s) SubCutaneous daily  escitalopram 5 milliGRAM(s) Oral daily  lacosamide 100 milliGRAM(s) Oral two times a day  melatonin 3 milliGRAM(s) Oral at bedtime  memantine 5 milliGRAM(s) Oral two times a day  metoprolol succinate ER 50 milliGRAM(s) Oral daily  QUEtiapine 12.5 milliGRAM(s) Oral once    MEDICATIONS  (PRN):    Vital Signs Last 24 Hrs  T(F): 97.7 (21 Jul 2020 08:28), Max: 98 (20 Jul 2020 13:39)  HR: 74 (21 Jul 2020 08:28) (69 - 75)  BP: 134/83 (21 Jul 2020 08:28) (107/64 - 134/83)  RR: 13 (21 Jul 2020 08:28) (13 - 15)  SpO2: 96% (21 Jul 2020 08:28) (95% - 98%)  I&O's Summary      PHYSICAL EXAM:  GENERAL: NAD  HEAD:  Atraumatic, Normocephalic  EYES: EOMI, PERRLA, conjunctiva and sclera clear  ENMT: No tonsillar erythema, exudates, or enlargement; Moist mucous membranes, Good dentition, No lesions  NECK: Supple, No JVD, Normal thyroid  NERVOUS SYSTEM:  Alert & Oriented X 2  CHEST/LUNG: Clear to percussion bilaterally; No rales, rhonchi, wheezing, or rubs  HEART: Regular rate and rhythm; No murmurs, rubs, or gallops  ABDOMEN: Soft, Nontender, Nondistended; Bowel sounds present  EXTREMITIES:  2+ Peripheral Pulses, No clubbing, cyanosis, or edema  LYMPH: No lymphadenopathy noted  SKIN: No rashes or lesions      LABS:                        12.9   4.76  )-----------( 125      ( 19 Jul 2020 06:08 )             38.5       07-19    141  |  105  |  26  ----------------------------<  78  4.1   |  25  |  0.93    Ca    8.9      19 Jul 2020 06:08    TPro  6.2  /  Alb  3.1  /  TBili  0.5  /  DBili  x   /  AST  36  /  ALT  32  /  AlkPhos  61  07-19     eGFR if Non African American: 56 mL/min/1.73M2 (07-19-20 @ 06:08)  eGFR if : 65 mL/min/1.73M2 (07-19-20 @ 06:08)             07-01 Chol 115 mg/dL LDL 53 mg/dL HDL 47 mg/dL Trig 75 mg/dL                        RADIOLOGY & ADDITIONAL TESTS:    Care Discussed with Consultants/Other Providers:
Patient is a 86y old  Female who presents with a chief complaint of s/p right PCa CVA with deficits (2020 09:23)      Patient seen and examined at bedside. She has been intermittently confused and agitated. Urinalysis done yesterday showed some bacteria.      ALLERGIES:  lidocaine (Other)    MEDICATIONS  (STANDING):  aspirin enteric coated 81 milliGRAM(s) Oral daily  atorvastatin 40 milliGRAM(s) Oral at bedtime  calcium carbonate 1250 mG  + Vitamin D (OsCal 500 + D) 1 Tablet(s) Oral daily  ciprofloxacin     Tablet 250 milliGRAM(s) Oral every 12 hours  diVALproex Sprinkle 500 milliGRAM(s) Oral <User Schedule>  enoxaparin Injectable 40 milliGRAM(s) SubCutaneous daily  escitalopram 5 milliGRAM(s) Oral daily  lacosamide 100 milliGRAM(s) Oral two times a day  melatonin 3 milliGRAM(s) Oral at bedtime  memantine 5 milliGRAM(s) Oral two times a day  metoprolol succinate ER 50 milliGRAM(s) Oral daily    MEDICATIONS  (PRN):    Vital Signs Last 24 Hrs  T(F): 97.5 (2020 07:55), Max: 98 (2020 20:31)  HR: 69 (2020 07:55) (69 - 73)  BP: 105/64 (2020 07:55) (105/64 - 134/80)  RR: 14 (2020 07:55) (14 - 14)  SpO2: 94% (2020 07:55) (94% - 100%)  I&O's Summary    2020 07:01  -  2020 07:00  --------------------------------------------------------  IN: 240 mL / OUT: 0 mL / NET: 240 mL      PHYSICAL EXAM:  GENERAL: NAD  HEAD:  Atraumatic, Normocephalic  EYES: EOMI, PERRLA, conjunctiva and sclera clear  ENMT: No tonsillar erythema, exudates, or enlargement; Moist mucous membranes, Good dentition, No lesions  NECK: Supple, No JVD, Normal thyroid  NERVOUS SYSTEM:  Confused, agitated  CHEST/LUNG: Clear to percussion bilaterally; No rales, rhonchi, wheezing, or rubs  HEART: Regular rate and rhythm; No murmurs, rubs, or gallops  ABDOMEN: Soft, Nontender, Nondistended; Bowel sounds present  EXTREMITIES:  2+ Peripheral Pulses, No clubbing, cyanosis, or edema  LYMPH: No lymphadenopathy noted  SKIN: No rashes or lesions      LABS:                         Chol 115 mg/dL LDL 53 mg/dL HDL 47 mg/dL Trig 75 mg/dL                  Urinalysis Basic - ( 2020 11:08 )    Color: Yellow / Appearance: Slightly Turbid / S.025 / pH: x  Gluc: x / Ketone: Small  / Bili: Negative / Urobili: 1   Blood: x / Protein: 30 mg/dL / Nitrite: Negative   Leuk Esterase: Moderate / RBC: 26-50 /HPF / WBC 6-10 /HPF   Sq Epi: x / Non Sq Epi: Many / Bacteria: Moderate /HPF          RADIOLOGY & ADDITIONAL TESTS:    Care Discussed with Consultants/Other Providers:

## 2020-07-22 NOTE — DISCHARGE NOTE NURSING/CASE MANAGEMENT/SOCIAL WORK - PATIENT PORTAL LINK FT
You can access the FollowMyHealth Patient Portal offered by [facility] by registering at the following website: [email or web address]. By joining [facility]’s FollowMyHealth portal, you will also be able to view your health information using other applications (apps) compatible with our system.

## 2020-07-22 NOTE — PROGRESS NOTE ADULT - ATTENDING COMMENTS
Day 2/5 on Cipro for UTI  Stable neurologically and medically , cleared for discharge home by Medicine.  Patient is being discharged home with home care today.  Discharge instructions were discussed with family, all current medications were sent to the pharmacy. Family( daughter) was educated on importance of medication compliance,  continued  care with PMD and follow-up care with the specialists in the community. Safety and fall risk precautions  were discussed in detail, counseled on healthy life style modifications.  All questions were answered to their satisfaction.

## 2020-07-22 NOTE — PROGRESS NOTE ADULT - ASSESSMENT
ASRA FARAH is a 87yo Female with hx multiple b/l CVas, now s/p acute right PCA CVA and decreased functional mobility, gait instability and ADL impairments.    Acute Right PCA CVA  -C/w PT/OT/SLP  -C/w ASA and atorvastatin  -fall and aspiration precautions  -puree diet  -no focal deficits    Dementia/Delerium  -Likely vascular dementia  -Waxing and waning mental status during rehab admission secondary to delerium  -UA with some bacteruria  -Start empiric ciprofloxacin 250 mg BID X 5 days  -F/U urine culture  -Recommend quetiapine 12.5 mg as needed for agitation    Seizure disorder  -EEG showed focal status epilepticus with eventual cessation  -Awake and alert on exam  -c/w depakote and Vimpat  -seizure precautions  -neurology follow up outpt    Essential HTN  -C/w metoprolol 50 mg daily  -Decreased lisinopril (started 7/13) to 2.5 mg daily, hold for SBP<140  -Normotensive today    CAD s/p 5 vessel CABG  -[facility] 2019: Patent 3V CABG. Free LIMA (D1-LAD). SVG-OM, SVG-RCA.  -c/w metoprolol, ASA, statin  -c/w lisinopril as above    DVT prophylaxis  -Lovenox SARA FARAH is a 85yo Female with hx multiple b/l CVas, now s/p acute right PCA CVA and decreased functional mobility, gait instability and ADL impairments.    Acute Right PCA CVA  -C/w PT/OT/SLP  -C/w ASA and atorvastatin  -fall and aspiration precautions  -puree diet  -no focal deficits    Dementia/Delerium  -Likely vascular dementia  -Waxing and waning mental status during rehab admission secondary to delirium Delirium likely multifactorial, secondary to hospitalization, as well as CVA.   -UA with some bacteruria. Start empiric ciprofloxacin 250 mg BID X 5 days  -F/U urine culture  -Recommend quetiapine 12.5 mg as needed for agitation    Seizure disorder  -EEG showed focal status epilepticus with eventual cessation  -Awake and alert on exam  -c/w depakote and Vimpat  -seizure precautions  -neurology follow up outpt    Essential HTN  -C/w metoprolol 50 mg daily  -Decreased lisinopril (started 7/13) to 2.5 mg daily, hold for SBP<140  -Normotensive today    CAD s/p 5 vessel CABG  -[facility] 2019: Patent 3V CABG. Free LIMA (D1-LAD). SVG-OM, SVG-RCA.  -c/w metoprolol, ASA, statin  -c/w lisinopril as above    DVT prophylaxis  -Lovenox

## 2020-07-22 NOTE — DISCHARGE NOTE NURSING/CASE MANAGEMENT/SOCIAL WORK - NSDCPEPTSTRK_GEN_ALL_CORE
Signs and symptoms of stroke/Call 911 for stroke/Prescribed medications/Risk factors for stroke/Stroke support groups for patients, families, and friends/Stroke warning signs and symptoms/Need for follow up after discharge/Stroke education booklet

## 2020-07-22 NOTE — PROGRESS NOTE ADULT - REASON FOR ADMISSION
s/p right PCa CVA with deficits

## 2020-07-23 NOTE — H&P ADULT - NSHPPHYSICALEXAM_GEN_ALL_CORE
Vital Signs Last 24 Hrs  T(C): 36.5 (23 Jul 2020 16:32), Max: 36.5 (23 Jul 2020 16:32)  T(F): 97.7 (23 Jul 2020 16:32), Max: 97.7 (23 Jul 2020 16:32)  HR: 85 (23 Jul 2020 16:32) (85 - 85)  BP: 124/68 (23 Jul 2020 16:32) (124/68 - 124/68)  BP(mean): --  RR: 17 (23 Jul 2020 16:32) (17 - 17)  SpO2: --  Daily     Daily   CAPILLARY BLOOD GLUCOSE      POCT Blood Glucose.: 108 mg/dL (23 Jul 2020 16:40)    I&O's Summary      GENERAL: NAD  HEAD:  Normocephalic  EYES: EOMI, PERRLA, conjunctiva and sclera clear  ENMT: No tonsillar erythema, exudates, or enlargement; Moist mucous membranes, No lesions  NECK: Supple, No JVD, no bruit, normal thyroid  NERVOUS SYSTEM:  Alert & Oriented X1. follows instructions poorly but able to open mouth, tongue midline, no facial droop. moves all fours spontaneously and equally.   CHEST/LUNG: Clear to auscultation bilaterally; No rales, rhonchi, wheezing, or rubs  HEART: Regular rate and rhythm; No murmurs, rubs, or gallops  ABDOMEN: Soft, Nontender, Nondistended; Bowel sounds present  EXTREMITIES:  2+ Peripheral Pulses, No clubbing, cyanosis, or edema  LYMPH: No lymphadenopathy noted  SKIN: No rashes or lesions

## 2020-07-23 NOTE — ED ADULT NURSE NOTE - NSIMPLEMENTINTERV_GEN_ALL_ED
Implemented All Fall with Harm Risk Interventions:  [place] to call system. Call bell, personal items and telephone within reach. Instruct patient to call for assistance. Room bathroom lighting operational. Non-slip footwear when patient is off stretcher. Physically safe environment: no spills, clutter or unnecessary equipment. Stretcher in lowest position, wheels locked, appropriate side rails in place. Provide visual cue, wrist band, yellow gown, etc. Monitor gait and stability. Monitor for mental status changes and reorient to person, place, and time. Review medications for side effects contributing to fall risk. Reinforce activity limits and safety measures with patient and family. Provide visual clues: red socks.

## 2020-07-23 NOTE — ED PROVIDER NOTE - NEUROLOGICAL, MLM
Alert and oriented, no focal deficits, no motor or sensory deficits. Alert  no focal deficits, no motor or sensory deficits.

## 2020-07-23 NOTE — ED PROVIDER NOTE - PSYCHIATRIC, MLM
Alert and oriented to person, place, time/situation. normal mood and affect. no apparent risk to self or others. Alert  normal mood and affect. no apparent risk to self or others.

## 2020-07-23 NOTE — ED PROVIDER NOTE - CONSTITUTIONAL MANNER
Pt waited in waiting room 10 min prior to BP being measured. He declined to rest another 5-10 min for recheck after first reading.   INAPPROPRIATE FOR SITUATION

## 2020-07-23 NOTE — ED PROVIDER NOTE - CLINICAL SUMMARY MEDICAL DECISION MAKING FREE TEXT BOX
86 y  f pmhx came to ed cc confused since yesterday unable to ambulate disch from rehab for stroke R L sided weakness, daughter reported visual issues as reported to her by home nurse , pt never complained pt confused  ct brain - chronic changes , ua neg

## 2020-07-23 NOTE — ED ADULT TRIAGE NOTE - CHIEF COMPLAINT QUOTE
pt presents to ED for evaluation of visual loss that began yesterday . Dr. Michelle in triage area and states pt is not a code stroke. Pt sent to room 12

## 2020-07-23 NOTE — H&P ADULT - ASSESSMENT
86F hx of dementia, R carotid endarterectomoy, CAD/CABG 5vl, multiple CVAs s/p recent D/C for recurrent CVA complicated with sz pw AMS and generalized weakness    # R/o CVA r/o sz  CT head with no new acute changes  check MR  consider EEG  neuro consult.   cont asa/lipitor  neuro checks. tele monitor.  check ECHO/carotid dopplers  cont sz meds    # HTN  cont Toprol with holding parameters    # Dementia  cont memantine    #DVT/GI proph 86F hx of dementia, R carotid endarterectomoy, CAD/CABG 5vl, multiple CVAs s/p recent D/C for recurrent CVA complicated with sz pw AMS and generalized weakness    # R/o CVA r/o sz  CT head with no new acute changes  check MR  consider EEG  neuro consult.   cont asa/lipitor  neuro checks. tele monitor.  check ECHO/carotid dopplers  cont sz meds    # HTN  cont Toprol with holding parameters    #UTI  UA clear now - complete course of cipro    # Dementia  cont memantine    #DVT/GI proph 86F hx of dementia, R carotid endarterectomoy, CAD/CABG 5vl, multiple CVAs s/p recent D/C for recurrent CVA complicated with sz pw AMS and generalized weakness    # R/o CVA r/o sz  CT head with no new acute changes  check MR  consider EEG  neuro consult.   cont asa/lipitor  neuro checks. tele monitor.  check ECHO/carotid dopplers  cont sz meds    #Thrombocytopenia  trend plts    # HTN  cont Toprol with holding parameters    #UTI  UA clear now - complete course of cipro    # Dementia  cont memantine    #DVT/GI proph

## 2020-07-23 NOTE — H&P ADULT - NSHPLABSRESULTS_GEN_ALL_CORE
13.1   5.19  )-----------( 81       ( 2020 16:40 )             38.7           141  |  105  |  30<H>  ----------------------------<  106<H>  6.4<HH>   |  31  |  0.93    Ca    9.1      2020 16:40    TPro  7.5  /  Alb  3.3  /  TBili  0.6  /  DBili  x   /  AST  62<H>  /  ALT  53<H>  /  AlkPhos  65  23         LIVER FUNCTIONS - ( 2020 16:40 )  Alb: 3.3 g/dL / Pro: 7.5 g/dL / ALK PHOS: 65 U/L / ALT: 53 U/L / AST: 62 U/L / GGT: x               PT/INR - ( 2020 16:40 )   PT: 12.4 sec;   INR: 1.03 ratio             CARDIAC MARKERS ( 2020 16:40 )  <.017 ng/mL / x     / x     / x     / x            Urinalysis Basic - ( 2020 18:00 )    Color: Yellow / Appearance: Clear / S.015 / pH: x  Gluc: x / Ketone: Small  / Bili: Negative / Urobili: 4   Blood: x / Protein: 15 / Nitrite: Negative   Leuk Esterase: Negative / RBC: 0-4 /HPF / WBC 0-2 /HPF   Sq Epi: x / Non Sq Epi: Neg.-Few / Bacteria: Negative /HPF        CAPILLARY BLOOD GLUCOSE      POCT Blood Glucose.: 108 mg/dL (2020 16:40)        EKG: NSR at 82bpm, nonspec t wave changes.       CXR: wet read    < from: CT Brain Stroke Protocol (20 @ 17:08) >    IMPRESSION:  No acute intracranial hemorrhage.      Multiple bilateral chronic infarcts. Age-indeterminate small infarct posterior left frontal parietal region. MRI exam recommended.    < end of copied text >    < from: Xray Chest 1 View AP/PA (20 @ 16:57) >    IMPRESSION: Stable findings as above with. No acute finding or change.    < end of copied text >

## 2020-07-23 NOTE — ED PROVIDER NOTE - OBJECTIVE STATEMENT
86 y  f pmhx came to ed cc confused since yesterday unable to ambulate disch from rehab for stroke R L sided weakness, daughter reported visual issues as reported to her by home nurse , pt never complained pt confused

## 2020-07-23 NOTE — H&P ADULT - HISTORY OF PRESENT ILLNESS
86F hx of dementia, R carotid endarterectomoy, CAD/CABG 5vl, multiple CVAs of unclear etiology (hx of ILR), hx CVA with sz d/o in 2012 off Keppra after 2017 was doing well until recent admission in 6/2020 with R occipital PCA infarct complicated with focal status epilepticus which was controlled with lacosamide and divalproex. Pt just D/C yesterday from rehab on anti-epileptics and sec course of antibx with cipro for recurrent UTI. New baseline, +visual deficits, but ambulating with walker and on no dysphagia diet. After returning home yesterday, pt was noticed to be extremely weak unable to get up from her chair. This am, daughter noticed pt not feeding herself and appeared to her that her R side was weak and R arm was curled in an unusual position and not ambulating. No clear sz activity. In ED, afebrile, normotensive sat 98%RA. CT head with multiple bl chronic infarcts small infarct post L frontoparietal region age indeterminate. Pt now back to baseline in terms of mental status passed dysphagia screen. Pt verbal pleasant denied any complaints. Denied HA, dizziness, CP, SOB, abd pain, dysuria.  Daughter at bedside.

## 2020-07-23 NOTE — PATIENT PROFILE ADULT - HEALTH LITERACY
H&P dictated. Job Id #[id number]    A/p    Acute hepatic encephalopathy. Alcoholic  Cirrhosis. Check head CT to r/o bleeding complications as he is coagulopathic.   LORENZA/Dehydration    Full code    Mona Gonzalez MD no

## 2020-07-23 NOTE — ED PROVIDER NOTE - RESPIRATORY, MLM
CHIEF COMPLAINT:  15-month well-child-check.    HISTORY OF PRESENT ILLNESS:  Olegario is a 16 month old male who presents for a well-child-exam.  Patient presents with Mother.    Concerns.  URI symptoms for 3 days.  No fever.   A little decreased intake.      Feeding.  Olegario has transitioned to whole milk. Olegario is eating solids well.    Elimination.  Normal wet diapers and bowel movements. No issues with constipation.    Sleeping.  Sleeps through the night. Sleeping arrangements Crib. Napping well without problem.    Teeth/Brushing.  How is toothbrushing going? Going well    Developmental Screening (PEDS-DM):  Ages and Stages questionnaire reviewed.    SOCIAL:  Primary caretakers: Mother and father .  Siblings:  yes  Smoke exposure: None  :  Yes  Olegario has begun to exhibit some temper tantrums.    No significant reaction to the previous immunizations    Patient Active Problem List    Diagnosis Date Noted   • History of iron deficiency 02/08/2017     Priority: Low   • Twin birth 2016     Priority: Low     Twin stillborn         Outpatient Prescriptions Marked as Taking for the 2/6/18 encounter (Office Visit) with Rick Pineda MD   Medication Sig Dispense Refill   • acetaminophen (TYLENOL) 160 MG/5ML suspension Take 15 mg/kg by mouth every 4 hours as needed for Fever.         ALLERGIES:  No Known Allergies    PHYSICAL EXAM:  Pulse 136, temperature 99.4 °F (37.4 °C), temperature source Temporal Artery, resp. rate 28, height 31.5\" (80 cm), weight 10.6 kg, head circumference 47.5 cm (18.7\").  52 %ile (Z= 0.06) based on WHO (Boys, 0-2 years) weight-for-age data using vitals from 2/6/2018.  46 %ile (Z= -0.11) based on WHO (Boys, 0-2 years) length-for-age data using vitals from 2/6/2018.  64 %ile (Z= 0.36) based on WHO (Boys, 0-2 years) head circumference-for-age data using vitals from 2/6/2018.  GENERAL:  Well-appearing male, nontoxic, no acute distress.  HEAD:  Normocephalic. Anterior fontanel open, soft and  flat.  EYES:  Pupils reactive to light. Conjunctivae clear. Red reflex seen bilaterally.  EARS:  Normal pinnae, no preauricular skin tags or pit. External auditory canals patent. TMs (Tympanic membranes) normal without erythema or effusion.  NARES:  Patent. Moderate clear discharge.  THROAT:  Moist mucous membranes without erythema or oral lesions. Palate intact.  NECK:  Supple, no lymphadenopathy or masses.  HEART:  Regular rate and rhythm.  No murmurs, rubs, or gallops. Normal S1 and S2.  Femoral pulses palpable and symmetric.  LUNGS:  Clear to auscultation bilaterally. No wheezes, rales, or rhonchi. Normal work of breathing. There is some congested upper airway noises.  ABDOMEN:  Soft, nondistended. No organomegaly or masses. Bowel sounds present.  GENITOURINARY:  Testes descended bilaterally.  and No hernia present  MUSCULOSKELETAL:  Hips with normal range of motion. Spine straight. Normal sacrum.  EXTREMITIES:  Warm, dry, without abnormalities.  NEUROLOGIC:  Normal tone, bulk, and strength. Deep tendon reflexes are normal and symmetric throughout.  SKIN:  Warm and well perfused. No cyanosis. No rashes or bruises or other lesions.    ASSESSMENT/PLAN:  Olegario is a 16 month old male here for a well-child check.  1. Growth and development reviewed. Will continue to monitor. Handout given regarding diet and normal pickiness, continuing with cow's milk and limiting this to 24 oz per day or less. We discussed continuing with a variety of fruits and vegetables and good iron intake. Also, reviewed pickiness at this age and continuing to put a variety of foods on the patient's plate even if they don't typically like them.  2. Vaccinations: Deferred today due to current illness.  3. Dosing for acetaminophen and ibuprofen discussed.  4. All parental concerns and questions discussed. Anticipatory guidance provided regarding safety, dental care, and expected developmental milestones and norms. Handout given.  5. We will see  the toddler back at 18 months of age or sooner should other issues arise.  6. URI.  At this point in time I do not appreciate any evidence of bacterial infection. The patient will continue with the usual supportive measures. Age and weight-based over-the-counter medication usage and dosage is discussed as appropriate. We discussed specific warning symptoms including increased work of breathing, overall worsening of clinical status, and dehydration. We discussed the expected course for the typical viral process. If any warning symptoms are encountered or the expected clinical course is not followed, I am to be contacted.     Breath sounds clear and equal bilaterally.

## 2020-07-23 NOTE — ED ADULT NURSE NOTE - OBJECTIVE STATEMENT
Pt, BIBA for visual check up.   According to pt's daughter pt has shown decreased responsiveness to visual stimulation.   Pt was discharged home from rehab yesterday and according to pt's daughter her status has significantly deteriorated for the past 24 hrs. As per pt's daughter pt was diagnosed with UTI.

## 2020-07-24 NOTE — CONSULT NOTE ADULT - SUBJECTIVE AND OBJECTIVE BOX
This is an 86 year old woman with a history of dementia carotid stenosis s/p right CEA, CAD s/p CABG X 5 vessels, multiple CVA, seizure disorder since 2012, was off keppra after 2017 and was doing well until 6/2020 when she had a right Occipital PCA infarct. Was in the hospital for status epilepticus.  Patient received lacosamide and depakote IV loading doses 7/3/20.  Was eventually discharged to rehab and went home 7/21/20. Was home for a short time before presenting again to ER at  for severe weakness, unable to get up. CT scan showed another age indeterminant infarct.  Patient remains on Apsirin, and was on lovenox for most of the prior hospital stays as well as the current one.  Platelets had been low for a year now, usually in the 117-160 range, platelets now  found to have declined from 125 on 7/19 to 81 on ER presentation and 63 today.      Hospital courses were also complicated with repeated UTI, patient was still on ciprofloxacin from the prior admission.      MEDICATIONS  (STANDING):  aspirin enteric coated 81 milliGRAM(s) Oral daily  atorvastatin 40 milliGRAM(s) Oral at bedtime  ciprofloxacin     Tablet 250 milliGRAM(s) Oral every 12 hours  diVALproex  milliGRAM(s) Oral daily  diVALproex  milliGRAM(s) Oral at bedtime  enoxaparin Injectable 40 milliGRAM(s) SubCutaneous daily  escitalopram 5 milliGRAM(s) Oral daily  lacosamide 100 milliGRAM(s) Oral two times a day  melatonin 3 milliGRAM(s) Oral at bedtime  memantine 5 milliGRAM(s) Oral two times a day  metoprolol succinate ER 50 milliGRAM(s) Oral daily  omega-3-Acid Ethyl Esters 2 Gram(s) Oral daily  pantoprazole    Tablet 40 milliGRAM(s) Oral before breakfast  sodium chloride 0.9%. 1000 milliLiter(s) (1000 mL/Hr) IV Continuous <Continuous>    MEDICATIONS  (PRN):  acetaminophen   Tablet .. 650 milliGRAM(s) Oral every 6 hours PRN Temp greater or equal to 38C (100.4F), Mild Pain (1 - 3)    PAST MEDICAL & SURGICAL HISTORY:  Dementia  Breast cancer  Peripheral vision loss  CAD (coronary artery disease)  HLD (hyperlipidemia)  Seizure  TIA (transient ischemic attack)  H/O lumpectomy: Left breast  S/P tonsillectomy  S/P carotid endarterectomy: right, 2006  S/P CABG x 5: 2006    Social History:  denied tob, ETOH or illicit drug us.e (23 Jul 2020 20:35)      07-24    145  |  109<H>  |  21  ----------------------------<  91  3.6   |  26  |  0.87    Ca    8.3<L>      24 Jul 2020 05:00    TPro  5.8<L>  /  Alb  2.9<L>  /  TBili  0.4  /  DBili  x   /  AST  35  /  ALT  36  /  AlkPhos  51  07-24                        11.2   4.57  )-----------( 63       ( 24 Jul 2020 05:00 )             34.7   Vital Signs Last 24 Hrs  T(C): 36.2 (24 Jul 2020 15:57), Max: 36.8 (23 Jul 2020 21:45)  T(F): 97.2 (24 Jul 2020 15:57), Max: 98.2 (23 Jul 2020 21:45)  HR: 81 (24 Jul 2020 15:57) (80 - 89)  BP: 152/65 (24 Jul 2020 15:57) (107/74 - 152/65)  BP(mean): 83 (23 Jul 2020 21:00) (83 - 83)  RR: 15 (24 Jul 2020 15:57) (15 - 25)  SpO2: 100% (24 Jul 2020 15:57) (92% - 100%)

## 2020-07-24 NOTE — CONSULT NOTE ADULT - ASSESSMENT
Patient is an 86 year old woman who was admitted yesterday, 7/23/20 following a recent discharge from Rehabilitation a few days ago. The daughter had noted that  the day PTA, 7/22/20  the patient was sitting in a chair and was weak in general and had difficulty getting out of the chair. The day of admission, yesterday, 7/23/20, she was not moving her right arm well and later observed in the hospital by the daughter to be back to baseline. The patient has had multiple bilateral CVAs in the past. The most recent 6/2020 with right occipital infarction. During that admission also found to have focal status and placed on lacosamide and valproic acid. the patient denies HA or other complaints. Neurological exam as above. Consideration that had TIA with transient paresis of RUE.    1) Agree with continuation of ASA 81 mg daily.  2) CT head as above multiple bilateral chronic infarcts as described including bilateral occipital. Age indeterminate infarct left frontal. MRI recommended. No hemmorhage.  3) MRI Head pending  4) TTE as above no thrombus  5) Carotid Duplex pending  6) Agree with EEG  7) The thrombocytopenia, 63,000 may be due to Valproic Acid. Level 93. Would decrease Valproic Acid to 500mg in AM and 250 mg PM.  8) Consider Hematology Consult for thrombocytopenia  9) Continue Lacosamide 100 mg bid.
This is an 86 year old woman with a history of CAD s/p 5 vessel CABG, carotid stenosis s/p CEA, seizure disorder following multiple CVA, most recently had another CVA 6/2020, status epilepticus loaded with lacosimide and depakote IV, remains on depakote PO.  Levl at the end of the loading period 7.  Patient admitted for recurrent severe weakness, probable new stroke found on CT scan.  Patient also found to be thrombocytopenic platelets counts down from 127 on 7/19 to 81 yesterday and 63 today.  Considered possibility of HIT since patient had been on Lovenox through the past hospital stay, however timing is wrong, 2 points on 4T score with 2 points for the level of thrombocytopenia. Low probability HIT, could continue prophylactic dose of lovenox 40mg SQ daily daily    Consider d/c the aspirin 81mg if platelets < 50 due to bleeding risk.    Platelet counts were always low on and off for the past year.  117-150's range at times however this new 63 is the lowest reported so far. Timing of the thrombocytopenia seems to have occurred more recently after starting depakote.  Level was on the higher side of therapeutic today at 99.  This is often a dose related thrombocytopenia, decreasing the dose may improve the platelet count, would follow counts for a few day should start trending up again with the decrease.    Check ESR, CRP RF rule out inflammatory state, we know she has a repeated UTI which could have contributed to the current thrombocytopenia though most recent UA was mostly normal.    B12 and folic acid was known to be normal on the last hospital stay.      R/O hepatitis B and C though this is very unlikely given clinical history.

## 2020-07-24 NOTE — CONSULT NOTE ADULT - SUBJECTIVE AND OBJECTIVE BOX
Patient is an 86 year old woman who was admitted yesterday, 7/23/20 following a recent discharge from Rehabilitation a few days ago. The daughter had noted that  the day PTA, 7/22/20  the patient was sitting in a chair and was weak in general and had difficulty getting out of the chair. The day of admission, yesterday, 7/23/20, she was not moving her right arm well and later observed in the hospital by the daughter to be back to baseline. The patient has had multiple bilateral CVAs in the past. The most recent 6/2020 with right occipital infarction. During that admission also found to have focal status and placed on lacosamide and valproic acid. the patient denies HA or other complaints.       PMH: As above Multiple CVAs. Last CVA 6/2020 with right Occipital infarction.           S/P Right carotid endarterectomy            Seizures in 2012 after a stroke and placed on Keppra until 2017.            June, 2020 -focal status epilepticus-lacosamide 100 mg bid and Valproic Acid 500mg bid            CAD/CABG            HLD            Breast CA    SH: Allergy-Lidocaine    [facility]: MI, Ovarian CA    Exam: Awake, slowed mentation, responds to questions with 2-3 word appropriate answers, follows simple commands occasionally            Pupils 2.5mm, VF-denies any vision            No Facial asymmetry            Motor tone and strength-Lifts all extremities in air except the left upper extremity which remains contracted flexed at the elbow and with fingers flexed                                      11.2   4.57  )-----------( 63       ( 24 Jul 2020 05:00 )             34.7     07-24    145  |  109<H>  |  21  ----------------------------<  91  3.6   |  26  |  0.87    Ca    8.3<L>      24 Jul 2020 05:00    TPro  5.8<L>  /  Alb  2.9<L>  /  TBili  0.4  /  DBili  x   /  AST  35  /  ALT  36  /  AlkPhos  51  07-24    Valproic Acid Level, Serum: 93 ug/mL (07.24.20 @ 05:00)    < from: CT Brain Stroke Protocol (07.23.20 @ 17:08) >    FINDINGS:  There is moderate diffuse parenchymal volume loss. There are areas of low attenuation in the periventricular white matter likely related to mild chronic microvascular ischemic changes.    Multiple chronic infarcts bilateral frontal, parietal and occipital lobes. Small chronic infarcts right cerebellum.    Small age-indeterminate infarct posterior left frontal parietal region measuring approximately 1.0 x 2.0 cm.    There is no acute intracranial hemorrhage, parenchymal mass,  or midline shift. . There is no hydrocephalus.    The cranium is intact. The visualized paranasal sinuses are well-aerated.    IMPRESSION:  No acute intracranial hemorrhage.      Multiple bilateral chronic infarcts. Age-indeterminate small infarct posterior left frontal parietal region. MRI exam recommended.    < from: TTE Echo Complete w/o Contrast w/ Doppler (07.24.20 @ 09:31) >    Summary:   1. Left ventricular ejection fraction, by visual estimation, is60 to 65%.   2. Technically fair study.   3. Normal global left ventricular systolic function.   4. Mildly increased LV wall thickness.   5. Spectral Doppler shows impaired relaxation pattern of left ventricular myocardial filling (Grade I diastolic dysfunction).   6. There is moderate concentric left ventricular hypertrophy.   7. Mild thickening and calcification of the anterior and posterior mitral valve leaflets.   8. Mild-moderate tricuspid regurgitation.   9. Mild to moderate aortic regurgitation.  10. Sclerotic aortic valve with decreased opening.  11. Moderate pulmonic valve regurgitation.  12. Pulmonary hypertension is absent.  13. LA volume Index is 16.2 ml/m² ml/m2.  14. Peak transaortic gradient equals 18.7 mmHg, mean transaortic gradient equals 9.5 mmHg, the calculated aortic valve area equals 0.96 cm² by the continuity equation consistent with moderate aortic stenosis.

## 2020-07-24 NOTE — PROGRESS NOTE ADULT - SUBJECTIVE AND OBJECTIVE BOX
PT seen and examined.  AAOx2 this morning.  Spoke to pt's daughter, Indira, who states the patient is DNR/DNI and has a copy of the MOLST form at home. She will bring a copy.  Obtained patient's history from daughter over the phone.    Vital Signs Last 24 Hrs  T(C): 36.6 (2020 06:20), Max: 36.8 (2020 21:45)  T(F): 97.9 (2020 06:20), Max: 98.2 (2020 21:45)  HR: 80 (2020 06:20) (79 - 89)  BP: 135/60 (2020 06:20) (105/52 - 135/60)  BP(mean): 83 (2020 21:00) (65 - 97)  RR: 19 (2020 06:20) (17 - 25)  SpO2: 97% (2020 06:20) (92% - 99%)    LABS:                        11.2   4.57  )-----------( 63       ( 2020 05:00 )             34.7     07-24    145  |  109<H>  |  21  ----------------------------<  91  3.6   |  26  |  0.87    Ca    8.3<L>      2020 05:00    TPro  5.8<L>  /  Alb  2.9<L>  /  TBili  0.4  /  DBili  x   /  AST  35  /  ALT  36  /  AlkPhos  51  07-24    PT/INR - ( 2020 16:40 )   PT: 12.4 sec;   INR: 1.03 ratio           Urinalysis Basic - ( 2020 18:00 )    Color: Yellow / Appearance: Clear / S.015 / pH: x  Gluc: x / Ketone: Small  / Bili: Negative / Urobili: 4   Blood: x / Protein: 15 / Nitrite: Negative   Leuk Esterase: Negative / RBC: 0-4 /HPF / WBC 0-2 /HPF   Sq Epi: x / Non Sq Epi: Neg.-Few / Bacteria: Negative /HPF      POCT Blood Glucose.: 108 mg/dL (2020 16:40)      RADIOLOGY & ADDITIONAL TESTS:    Consultant(s) Notes Reviewed:  [x ] YES  [ ] NO  Care Discussed with Consultants/Other Providers [ x] YES  [ ] NO  Imaging Personally Reviewed:  [ ] YES  [ ] NO

## 2020-07-24 NOTE — PROGRESS NOTE ADULT - ASSESSMENT
86F hx of dementia, R carotid endarterectomoy, CAD/CABG 5vl, multiple CVAs s/p recent D/C for recurrent CVA complicated with sz pw AMS and generalized weakness    # R/o CVA r/o seizure disorder  CT head with no new acute changes  check MR brain no contrast  consider EEG  neuro consulted  cont asa/lipitor  neuro checks. tele monitor.  check ECHO/carotid dopplers  - pt was started on depakote and lacosamide on admission - will discuss w Neuro need for antiepileptics    #Thrombocytopenia  trend plts    # HTN  cont Toprol with holding parameters    #UTI  UA clear now - will complete course of cipro    # Dementia  cont memantine    #DVT/GI proph 86F hx of dementia, R carotid endarterectomoy, CAD/CABG 5vl, multiple CVAs s/p recent D/C for recurrent CVA complicated with sz pw AMS and generalized weakness    # R/o CVA r/o seizure disorder  CT head with no new acute changes  consider EEG  cont asa/lipitor  neuro checks. tele monitor.  check ECHO/carotid dopplers  Continue depakote and lacosamide  MRI brain no contrast today  neuro consulted    Prior hx of CVA  - most recent CVA 06/30/2020    Seizure disorder  - on depakote and lacosamide    #Thrombocytopenia  trend plts - slowly trending down  no active signs of bleeding    # HTN  cont Toprol with holding parameters    #UTI  UA clear now - will complete course of cipro    # Dementia  cont memantine    #DVT/GI proph 86F hx of dementia, R carotid endarterectomoy, CAD/CABG 5vl, multiple CVAs s/p recent D/C for recurrent CVA complicated with sz pw AMS and generalized weakness    # R/o CVA r/o seizure disorder  CT head with no new acute changes  consider EEG  cont asa/lipitor  neuro checks. tele monitor.  check ECHO/carotid dopplers  Continue depakote and lacosamide  MRI brain no contrast today  neuro consulted    Prior hx of CVA  - most recent CVA 06/30/2020    Seizure disorder  - on depakote and lacosamide    #Thrombocytopenia  trend plts - slowly trending down  no active signs of bleeding    # HTN  cont Toprol with holding parameters    #UTI  UA clear now - will complete course of cipro    # Dementia  cont memantine    #DVT/GI proph       Further hx obtained and case discussed with Daughter Indira over the phone. Ph no. is in the chart.

## 2020-07-25 NOTE — PROGRESS NOTE ADULT - ASSESSMENT
Patient is an 86 year old woman who was admitted yesterday, 7/23/20 following a recent discharge from Rehabilitation a few days ago. The daughter had noted that  the day PTA, 7/22/20  the patient was sitting in a chair and was weak in general and had difficulty getting out of the chair. The day of admission, yesterday, 7/23/20, she was not moving her right arm well and later observed in the hospital by the daughter to be back to baseline. The patient has had multiple bilateral CVAs in the past. The most recent 6/2020 with right occipital infarction. During that admission also found to have focal status and placed on lacosamide and valproic acid. the patient denies HA or other complaints. Neurological exam as above.     1) Continue ASA 81 mg daily.  2) CT head  multiple bilateral chronic infarcts as described including bilateral occipital. Age indeterminate infarct left frontal. MRI recommended. No hemmorhage.  3) MRI Head as above acute infarction left parietal. Possible acute additonal small infarction right occipital to prior infarction right occipital  6/30/20. Chronic bilateral infarcts as described.   4) TTE  no thrombus  5) Carotid Duplex as above no significant stenosis  6)  EEG as above diffuse cerebral dysfunction. Sharply contoured waves right central region of equivocal significance. If clinically indicated 24 HR EEG may be helpful.  7) The thrombocytopenia, 63,000 may be due to Valproic Acid. Level 93. Would decrease Valproic Acid to 500mg in AM and 250 mg PM.       The Valproic Acid was reduced 7/24/20 and the platelets as above 59.000 7/25/20.      Continue Valproic Acid 500mg AM and 250 mg PM  8) Appreciate Hematology Consult . To consider discontinuation of ASA if platelets less than 50,000. Follow CBC/Diff and additional studies suggested.  9) Continue Lacosamide 100 mg bid.

## 2020-07-25 NOTE — EEG REPORT - NS EEG TEXT BOX
[facility]  Report of Routine EEG Study with Video      [facility]: [address], [address] [place] [address], Phone: [phone]  [facility]: [address] [place] [address], Phone: [phone]  Office: [address] [place] [address], [place] [address], [place] [address], Phone: [phone]    Patient Name: SARA FARAH  Age: 86 year  : 1934  Patient ID: -, MRN #: -, Location: TELE BED 227W  Referring Physician: -  EEG #:     Study Date: 2020		    Technical Information:					  On Instrument: -  Placement and Labeling of Electrodes:  The EEG was performed utilizing 20 channels referential EEG connections (coronal over temporal over parasagittal montage) using all standard 10-20 electrode placements with EKG.  Recording was at a sampling rate of 256 samples per second per channel.  Time synchronized digital video recording was done simultaneously with EEG recording.  A low light infrared camera was used for low light recording.  Beni and seizure detection algorithms were utilized.    History:  86 y o F pt p/w AMS hx of sz. R/o sz      Medication	  No Data.	    [Abbreviation Key:  PDR=alpha rhythm/posterior dominant rhythm. A-P=anterior posterior gradient.  Amplitude: ‘very low’:<20; ‘low’:20-50; ‘medium’:; ‘high’:>200uV.  Persistence for periodic/rhythmic patterns (% of epoch) ‘rare’:<1%; ‘occasional’:1-10%; ‘frequent’:10-50%; ‘abundant’:50-90%; ‘continuous’:>90%.  Persistence for sporadic discharges: ‘rare’:<1/hr; ‘occasional’:1/min-1/hr; ‘frequent’:>1/min; ‘abundant’:>1/10 sec.  GRDA=generalized rhythmic delta activity, LRDA=lateralized rhythmic delta activity, TIRDA=temporal intermittent rhythmic delta activity, FIRDA=frontal intermittent rhythmic activity. LPD=PLED=lateralized periodic discharges, GPD=generalized periodic discharges, BiPDs=BiPLEDs=bilateral independent periodic epileptiform discharges, SIRPID=stimulus induced rhythmic, periodic, or ictal appearing discharges.  Modifiers: +F=with fast component, +S=with spike component, +R=with rhythmic component.  S-B=burst suppression pattern. PFA: paroxysmal fast activity. Max=maximal. N1-drowsy, N2-stage II sleep, N3-slow wave sleep.  HV=hyperventilation, PS=photic stimulation]    Study Interpretation:    FINDINGS:      Background:   -Clear wakefulness was not captured. No posterior dominant rhythm seen. The background was disorganized and consisted of a mix of activity predominantly in the delta and theta frequency range.     Background Slowing:  -Diffuse theta and polymorphic delta slowing.    Focal Slowing:   -None present.    Sleep Background:  -Drowsiness was characterized by fragmentation, attenuation, and slowing of the background activity.    -Stage II sleep transients were not recorded.    Other Non-Epileptiform Findings:  -None were present.    Interictal Epileptiform Activity:   -There are two sharply contoured waves in the right central region that may represent either asymmetrical vertex activity or epileptiform activity. The activity is not sufficiently distinct to allow differentiation.    Events:  -Clinical events: None recorded.  -Seizures: None recorded.    Activation Procedures:   -Hyperventilation was not performed.    -Photic stimulation was not performed.    Artifacts:  -Intermittent myogenic and movement artifacts were noted.    ECG:  -The heart rate on single channel ECG was predominantly between 70-80 BPM.    EEG Summary/Classification:  Abnormal EEG in a lethargic drowy patient  -background slowing, generalized  -equivocal sharply contoured activity in right central region, as described above, of unclear significance.     EEG Impression/Clinical Correlate:  There is diffuse cerebral dysfunction.  There are sharply contoured waves in the right central region of equivocal significance. If clinically indicated, a continuous 24h EEG may be helpful.   ________________________________________  		      Dominic Owens MD PhD  Director, Epilepsy Division, [facility]  Report of Routine EEG Study with Video      [facility]: [address], [address] [place] [address], Phone: [phone]  [facility]: [address] [place] [address], Phone: [phone]  Office: [address] [place] [address], [place] [address], [place] [address], Phone: [phone]    Patient Name: SARA FARAH  Age: 86 year  : 1934  Patient ID: -, MRN #: -, Location: TELE BED 227W  Referring Physician: -  EEG #:     Study Date: 2020		    Technical Information:					  On Instrument: -  Placement and Labeling of Electrodes:  The EEG was performed utilizing 20 channels referential EEG connections (coronal over temporal over parasagittal montage) using all standard 10-20 electrode placements with EKG.  Recording was at a sampling rate of 256 samples per second per channel.  Time synchronized digital video recording was done simultaneously with EEG recording.  A low light infrared camera was used for low light recording.  Beni and seizure detection algorithms were utilized.    History:  86 y o F pt p/w AMS hx of sz. R/o sz      Medication	  No Data.	    [Abbreviation Key:  PDR=alpha rhythm/posterior dominant rhythm. A-P=anterior posterior gradient.  Amplitude: ‘very low’:<20; ‘low’:20-50; ‘medium’:; ‘high’:>200uV.  Persistence for periodic/rhythmic patterns (% of epoch) ‘rare’:<1%; ‘occasional’:1-10%; ‘frequent’:10-50%; ‘abundant’:50-90%; ‘continuous’:>90%.  Persistence for sporadic discharges: ‘rare’:<1/hr; ‘occasional’:1/min-1/hr; ‘frequent’:>1/min; ‘abundant’:>1/10 sec.  GRDA=generalized rhythmic delta activity, LRDA=lateralized rhythmic delta activity, TIRDA=temporal intermittent rhythmic delta activity, FIRDA=frontal intermittent rhythmic activity. LPD=PLED=lateralized periodic discharges, GPD=generalized periodic discharges, BiPDs=BiPLEDs=bilateral independent periodic epileptiform discharges, SIRPID=stimulus induced rhythmic, periodic, or ictal appearing discharges.  Modifiers: +F=with fast component, +S=with spike component, +R=with rhythmic component.  S-B=burst suppression pattern. PFA: paroxysmal fast activity. Max=maximal. N1-drowsy, N2-stage II sleep, N3-slow wave sleep.  HV=hyperventilation, PS=photic stimulation]    Study Interpretation:    FINDINGS:      Background:   -Clear wakefulness was not captured. No posterior dominant rhythm seen. The background was disorganized and consisted of a mix of activity predominantly in the delta and theta frequency range.     Background Slowing:  -Diffuse theta and polymorphic delta slowing.    Focal Slowing:   -None present.    Sleep Background:  -Drowsiness was characterized by fragmentation, attenuation, and slowing of the background activity.    -Stage II sleep transients were not recorded.    Other Non-Epileptiform Findings:  -None were present.    Interictal Epileptiform Activity:   -There are two sharply contoured waves in the right central region that may represent either asymmetrical vertex activity or epileptiform activity. The activity is not sufficiently distinct to allow differentiation.    Events:  -Clinical events: None recorded.  -Seizures: None recorded.    Activation Procedures:   -Hyperventilation was not performed.    -Photic stimulation did not alter the background.    Artifacts:  -Intermittent myogenic and movement artifacts were noted.    ECG:  -The heart rate on single channel ECG was predominantly between 70-80 BPM.    EEG Summary/Classification:  Abnormal EEG in a lethargic drowy patient  -background slowing, generalized  -equivocal sharply contoured activity in right central region, as described above, of unclear significance.     EEG Impression/Clinical Correlate:  There is diffuse cerebral dysfunction.  There are sharply contoured waves in the right central region of equivocal significance. If clinically indicated, a continuous 24h EEG may be helpful.   ________________________________________  		      Dominic Owens MD PhD  Director, Epilepsy Division, [facility]

## 2020-07-25 NOTE — PROGRESS NOTE ADULT - SUBJECTIVE AND OBJECTIVE BOX
Patient is a 86y old  Female who presents with a chief complaint of AMS (2020 19:21)    Patient seen and examined at bedside.  no acute events overnight    ALLERGIES:  lidocaine (Other)        Vital Signs Last 24 Hrs  T(F): 98.2 (2020 05:42), Max: 98.2 (2020 05:42)  HR: 73 (2020 05:42) (73 - 81)  BP: 131/57 (2020 05:42) (127/51 - 152/65)  RR: 17 (2020 05:42) (15 - 19)  SpO2: 95% (2020 05:42) (93% - 100%)  I&O's Summary    2020 07:01  -  2020 07:00  --------------------------------------------------------  IN: 610 mL / OUT: 6 mL / NET: 604 mL    2020 07:01  -  2020 11:31  --------------------------------------------------------  IN: 50 mL / OUT: 0 mL / NET: 50 mL      MEDICATIONS:  acetaminophen   Tablet .. 650 milliGRAM(s) Oral every 6 hours PRN  aspirin enteric coated 81 milliGRAM(s) Oral daily  atorvastatin 40 milliGRAM(s) Oral at bedtime  ciprofloxacin     Tablet 250 milliGRAM(s) Oral every 12 hours  diVALproex  milliGRAM(s) Oral daily  diVALproex  milliGRAM(s) Oral at bedtime  enoxaparin Injectable 40 milliGRAM(s) SubCutaneous daily  escitalopram 5 milliGRAM(s) Oral daily  lacosamide 100 milliGRAM(s) Oral two times a day  melatonin 3 milliGRAM(s) Oral at bedtime  memantine 5 milliGRAM(s) Oral two times a day  metoprolol succinate ER 50 milliGRAM(s) Oral daily  omega-3-Acid Ethyl Esters 2 Gram(s) Oral daily  pantoprazole    Tablet 40 milliGRAM(s) Oral before breakfast      PHYSICAL EXAM:  General: NAD  ENT: MMM, no oral thrush  Neck: Supple, No JVD  Lungs: Clear to auscultation bilaterally, non labored breathing  Cardio: S1S2 regular  Abdomen: Soft, Nontender, Nondistended; Bowel sounds present  Extremities: No cyanosis, No edema    LABS:                        12.0   3.88  )-----------( 59       ( 2020 09:25 )             35.2     -    142  |  109  |  20  ----------------------------<  86  3.7   |  28  |  0.78    Ca    8.3      2020 09:25    TPro  6.2  /  Alb  2.8  /  TBili  0.4  /  DBili  x   /  AST  42  /  ALT  35  /  AlkPhos  51  25    eGFR if Non : 69 mL/min/1.73M2 (20 @ 09:25)  eGFR if : 80 mL/min/1.73M2 (20 @ 09:25)    PT/INR - ( 2020 16:40 )   PT: 12.4 sec;   INR: 1.03 ratio             CARDIAC MARKERS ( 2020 19:54 )  x     / x     / 99 U/L / x     / x      CARDIAC MARKERS ( 2020 16:40 )  <.017 ng/mL / x     / x     / x     / x          07-24 Chol 122 mg/dL LDL 67 mg/dL HDL 38 mg/dL Trig 84 mg/dL                  Urinalysis Basic - ( 2020 18:00 )    Color: Yellow / Appearance: Clear / S.015 / pH: x  Gluc: x / Ketone: Small  / Bili: Negative / Urobili: 4   Blood: x / Protein: 15 / Nitrite: Negative   Leuk Esterase: Negative / RBC: 0-4 /HPF / WBC 0-2 /HPF   Sq Epi: x / Non Sq Epi: Neg.-Few / Bacteria: Negative /HPF        Culture - Urine (collected 2020 18:00)  Source: .Urine Clean Catch (Midstream)  Final Report (2020 21:54):    No growth    Culture - Urine (collected 2020 15:00)  Source: .Urine Clean Catch (Midstream)  Final Report (2020 21:32):    10,000 - 49,000 CFU/mL Escherichia coli  Organism: Escherichia coli (2020 21:32)  Organism: Escherichia coli (2020 21:32)      -  Amikacin: S <=16      -  Amoxicillin/Clavulanic Acid: S <=8/4      -  Ampicillin: S <=8 These ampicillin results predict results for amoxicillin      -  Ampicillin/Sulbactam: S <=4/2 Enterobacter, Citrobacter, and Serratia may develop resistance during prolonged therapy (3-4 days)      -  Aztreonam: S <=4      -  Cefazolin: S <=2 (MIC_CL_COM_ENTERIC_CEFAZU) For uncomplicated UTI with K. pneumoniae, E. coli, or P. mirablis: KEVIN <=16 is sensitive and KEVIN >=32 is resistant. This also predicts results for oral agents cefaclor, cefdinir, cefpodoxime, cefprozil, cefuroxime axetil, cephalexin and locarbef for uncomplicated UTI. Note that some isolates may be susceptible to these agents while testing resistant to cefazolin.      -  Cefepime: S <=2      -  Cefotaxime: S <=2      -  Cefoxitin: S <=8      -  Ceftazidime: S <=1      -  Ceftriaxone: S <=1 Enterobacter, Citrobacter, and Serratia may develop resistance during prolonged therapy      -  Cefuroxime: S <=4      -  Ciprofloxacin: S <=0.25      -  Ertapenem: S <=0.5      -  Gentamicin: S <=2      -  Levofloxacin: S <=0.5      -  Meropenem: S <=1      -  Minocycline: S <=4      -  Nitrofurantoin: S <=32 Should not be used to treat pyelonephritis      -  Piperacillin/Tazobactam: S <=8      -  Tigecycline: S <=2      -  Tobramycin: S <=2      -  Trimethoprim/Sulfamethoxazole: S <=0.5/9.5      Method Type: KEVIN        RADIOLOGY & ADDITIONAL TESTS:    Care Discussed with Consultants/Other Providers:

## 2020-07-25 NOTE — PROGRESS NOTE ADULT - ATTENDING COMMENTS
I have personally seen and examined patient on the above date.  I discussed the case with eJrry DEWITT and I agree with findings and plan as detailed per note above, which I have amended where appropriate.      Acute CVA - ASA, Statin  Seizure disorder - depakote, lacosamide  Thrombocytopenia - monitor for now

## 2020-07-25 NOTE — PROGRESS NOTE ADULT - SUBJECTIVE AND OBJECTIVE BOX
Patient is an 86 year old woman who was admitted yesterday, 7/23/20 following a recent discharge from Rehabilitation a few days ago. The daughter had noted that  the day PTA, 7/22/20  the patient was sitting in a chair and was weak in general and had difficulty getting out of the chair. The day of admission, yesterday, 7/23/20, she was not moving her right arm well and later observed in the hospital by the daughter to be back to baseline. The patient has had multiple bilateral CVAs in the past. The most recent 6/2020 with right occipital infarction. During that admission also found to have focal status and placed on lacosamide and valproic acid. the patient denies HA or other complaints. Today, Saturday, she denies HA or other complaints.      PMH: As above Multiple CVAs. Last CVA 6/2020 with right Occipital infarction.           S/P Right carotid endarterectomy            Seizures in 2012 after a stroke and placed on Keppra until 2017.            June, 2020 -focal status epilepticus-lacosamide 100 mg bid and Valproic Acid 500mg bid            CAD/CABG            HLD            Breast CA    SH: Allergy-Lidocaine    [facility]: MI, Ovarian CA    Exam: Awake, slowed mentation, responds to questions with 2-3 word appropriate answers, follows simple commands occasionally            Pupils 2.5mm, VF-denies any vision            No Facial asymmetry            Motor tone and strength-Lifts all extremities in air including the left upper extremity which previously remained flexed at the elbow and was resistant to extension.                                     11.2   4.57  )-----------( 63       ( 24 Jul 2020 05:00 )             34.7                           12.0   3.88  )-----------( 59       ( 25 Jul 2020 09:25 )             35.2           07-24    145  |  109<H>  |  21  ----------------------------<  91  3.6   |  26  |  0.87    Ca    8.3<L>      24 Jul 2020 05:00    TPro  5.8<L>  /  Alb  2.9<L>  /  TBili  0.4  /  DBili  x   /  AST  35  /  ALT  36  /  AlkPhos  51  07-24    Valproic Acid Level, Serum: 93 ug/mL (07.24.20 @ 05:00)    < from: CT Brain Stroke Protocol (07.23.20 @ 17:08) >    FINDINGS:  There is moderate diffuse parenchymal volume loss. There are areas of low attenuation in the periventricular white matter likely related to mild chronic microvascular ischemic changes.    Multiple chronic infarcts bilateral frontal, parietal and occipital lobes. Small chronic infarcts right cerebellum.    Small age-indeterminate infarct posterior left frontal parietal region measuring approximately 1.0 x 2.0 cm.    There is no acute intracranial hemorrhage, parenchymal mass,  or midline shift. . There is no hydrocephalus.    The cranium is intact. The visualized paranasal sinuses are well-aerated.    IMPRESSION:  No acute intracranial hemorrhage.      Multiple bilateral chronic infarcts. Age-indeterminate small infarct posterior left frontal parietal region. MRI exam recommended.      < from: MR Head No Cont (07.24.20 @ 14:44) >    FINDINGS:  Motion artifact limits evaluation. Within these limits:    Acute infarction is noted involving the left parietal lobe, involving the postcentral gyrus. Small focus of reduced diffusion noted in the right occipital lobe in the periventricular region, possibly representing additional acute infarction versus residual reduced diffusion from infarction noted on 6/30/2020 MRI.    Chronic infarctions are noted throughout the bilateral cerebral and cerebellar hemispheres. There is parenchymal volume loss with ex vacuo dilatation of the lateral ventricles. No large parenchymal hemorrhage is identified.     There is no hydrocephalus. No extra-axial fluid collections. The skull base flow voids are present.    The visualized intraorbital contents are normal. The imaged portions of the paranasal sinuses are clear. Fluid within the bilateral mastoid air cells.    IMPRESSION:   Acute infarction involving the left parietal lobe. Possible small acute infarction involving the right occipital periventricular region versus residual reduced diffusion from acute infarction noted on 6/30/2020 MRI. Numerous other cerebral and cerebellar chronic infarctions again noted.    No large parenchymal hemorrhage.    < from: US Duplex Carotid Arteries Complete, Bilateral (07.25.20 @ 11:35) >    FINDINGS:    Blood flow velocities are as follows:    RIGHT (PSV in cm/s):       PROX CCA = 35; DIST CCA = 46; PROX ICA = 40; DIST ICA = 104; ECA = 372    LEFT (PSV in cm/s):       PROX CCA = 67; DIST CCA = 56; PROX ICA = 46; DIST ICA = 71; ECA = 74    Measurement of carotid stenosis is based on velocity parameters that correlate the residual internal carotid diameter with that of the more distal vessel in accordance with a method such as the [employer] American Symptomatic Carotid Endarterectomy Trial(NASCET).    Mild calcified intimal plaque is present in both carotid systems. No disturbed color-flow or elevated blood flow velocities are encountered in the internal carotid arteries. Elevated right external carotid artery velocities are noted. Antegrade flow is present in both vertebral arteries.    IMPRESSION:    No duplex evidence of hemodynamically significant internal carotid artery stenosis.            < from: TTE Echo Complete w/o Contrast w/ Doppler (07.24.20 @ 09:31) >    Summary:   1. Left ventricular ejection fraction, by visual estimation, is60 to 65%.   2. Technically fair study.   3. Normal global left ventricular systolic function.   4. Mildly increased LV wall thickness.   5. Spectral Doppler shows impaired relaxation pattern of left ventricular myocardial filling (Grade I diastolic dysfunction).   6. There is moderate concentric left ventricular hypertrophy.   7. Mild thickening and calcification of the anterior and posterior mitral valve leaflets.   8. Mild-moderate tricuspid regurgitation.   9. Mild to moderate aortic regurgitation.  10. Sclerotic aortic valve with decreased opening.  11. Moderate pulmonic valve regurgitation.  12. Pulmonary hypertension is absent.  13. LA volume Index is 16.2 ml/m² ml/m2.  14. Peak transaortic gradient equals 18.7 mmHg, mean transaortic gradient equals 9.5 mmHg, the calculated aortic valve area equals 0.96 cm² by the continuity equation consistent with moderate aortic stenosis.    EEG-7/24/20  EEG Summary/Classification:  Abnormal EEG in a lethargic drowy patient  -background slowing, generalized  -equivocal sharply contoured activity in right central region, as described above, of unclear significance.     EEG Impression/Clinical Correlate:  There is diffuse cerebral dysfunction.  There are sharply contoured waves in the right central region of equivocal significance. If clinically indicated, a continuous 24h EEG may be helpful.   ________________________________________

## 2020-07-26 NOTE — PROGRESS NOTE ADULT - ATTENDING COMMENTS
Agree with the above plan except when edited where appropriate. Case discussed with Jerry DEWITT.    Acute CVA  Vascular Dementia  seizure disorder  thrombocytopenia    Case discussed with Daughter, Indira. Family wants palliative/comfort care for their mother to be set up for home upon discharge.

## 2020-07-26 NOTE — DISCHARGE NOTE PROVIDER - NSDCFUSCHEDAPPT_GEN_ALL_CORE_FT
SARA FARAH ; 10/07/2020 ; [facility] SARA FARAH ; 10/07/2020 ; NPP [facility]

## 2020-07-26 NOTE — PROGRESS NOTE ADULT - ASSESSMENT
86 female with dementia, right carotid endarterectomy, cad/cabg 5 vl, multiple CVA  s of unclear etiology, s/p recent admission to acute rehab 2/2 right occipital PCA infarct complicated by focal status epilepticus which was controlled with lacosamide and divalproex now presents with extreme weakness, unable to get up from her chair, right arm weakness.    # Acute CVA  neuro consult appreciated  pt with hx of multiple bilateral CVAs in the past, also s/p recent admission in acute rehab for right occipital PCA distribution infarct  CTH in ED on 7/23 unremarkable  MRI on 7/24 showed acute infarction involving left parietal lobe, chronic infarctions are noted throughout bilateral cerebral and cerebellar hemispheres  ECHO done on 7/24 showed normal EF, grade 1 DD, mild-mod TR/AR, no thrombus  EEG revealed diffuse cerebral dysfunction  continue asa and statin therapies  spoke to family, requesting palliative consult, wants only comfort care  pt is DNR/I, family said they would bring in [facility]    # Seizure disorder  pt had recent cva in 6/2020, status epilepticus loaded with lacosamide and depakote  plan to continue lacosamide and divalproex  EEG showed diffuse cerebral dysfunction  neuro following    # Thrombocytopenia  hematology consult appreciated  platelets today 59   low probability of HIT  may be related to depakote, (frequency changed from BID to daily)  Inflammatory markers all wnl, hepatitis serologies negative  continue to monitor    # HTN  continue toprol with holding parameters    # UTI  plan to complete course of cipro - last day    # Dementia  chronic stable condition  continue memantine

## 2020-07-26 NOTE — PHYSICAL THERAPY INITIAL EVALUATION ADULT - PERTINENT HX OF CURRENT PROBLEM, REHAB EVAL
86F hx of dementia, R carotid endarterectomoy, CAD/CABG 5vl, multiple CVAs s/p recent D/C for recurrent CVA complicated with sz pw AMS and generalized weakness

## 2020-07-26 NOTE — DISCHARGE NOTE PROVIDER - CARE PROVIDER_API CALL
Vinay Floyd)  Internal Medicine  [address] [place] [address]  Phone: [phone]  Fax: [phone]  Follow Up Time:     Ruby Williamson  NEUROLOGY  [address] [place] [address]  Phone: [phone]  Fax: [phone])-  Follow Up Time:

## 2020-07-26 NOTE — DISCHARGE NOTE PROVIDER - NSDCMRMEDTOKEN_GEN_ALL_CORE_FT
aspirin 81 mg oral delayed release tablet: 1 tab(s) orally once a day  atorvastatin 40 mg oral tablet: 1 tab(s) orally once a day (at bedtime)  calcium-vitamin D 500 mg-200 intl units (5 mcg) oral tablet: 1 tab(s) orally once a day  Co Q-10 100 mg oral capsule: 2 cap(s) orally once a day  Depakote 500 mg oral delayed release tablet: 1 tab(s) orally once a day  Depakote  mg oral tablet, extended release: 1 tab(s) orally once a day (at bedtime)  escitalopram 5 mg oral tablet: 1 tab(s) orally once a day  Fish Oil 1000 mg oral capsule: 2 cap(s) orally once a day  lacosamide 100 mg oral tablet: 1 tab(s) orally 2 times a day  melatonin 3 mg oral tablet: 1 tab(s) orally once a day (at bedtime)  memantine 5 mg oral tablet: 1 tab(s) orally 2 times a day  metoprolol succinate 50 mg oral tablet, extended release: 1 tab(s) orally once a day  Vitamin B-12 500 mcg oral tablet: 1 tab(s) orally once a day

## 2020-07-26 NOTE — PROGRESS NOTE ADULT - ASSESSMENT
Patient is an 86 year old woman who was admitted yesterday, 7/23/20 following a recent discharge from Rehabilitation a few days ago. The daughter had noted that  the day PTA, 7/22/20  the patient was sitting in a chair and was weak in general and had difficulty getting out of the chair. The day of admission, yesterday, 7/23/20, she was not moving her right arm well and later observed in the hospital by the daughter to be back to baseline. The patient has had multiple bilateral CVAs in the past. The most recent 6/2020 with right occipital infarction. During that admission also found to have focal status and placed on lacosamide and valproic acid. the patient denies HA or other complaints. Neurological exam as above.     1) Continue ASA 81 mg daily.  2) CT head  multiple bilateral chronic infarcts as described including bilateral occipital. Age indeterminate infarct left frontal. MRI recommended. No hemmorhage.  3) MRI Head  acute infarction left parietal. Possible acute additonal small infarction right occipital to prior infarction right occipital  6/30/20. Chronic bilateral infarcts as described.   4) TTE  no thrombus  5) Carotid Duplex  no significant stenosis  6)  EEG  diffuse cerebral dysfunction. Sharply contoured waves right central region of equivocal significance. If clinically indicated 24 HR EEG may be helpful.  7) The thrombocytopenia, 63,000 may be due to Valproic Acid. Level 93. Would decrease Valproic Acid to 500mg in AM and 250 mg PM.       The Valproic Acid was reduced 7/24/20 and the platelets as above 59.000 7/25/20 and 59,000 on 7/26/20      Continue Valproic Acid 500mg AM and 250 mg PM  8) Appreciate Hematology Consult . To consider discontinuation of ASA if platelets less than 50,000. Follow CBC/Diff and additional studies suggested.  9) Continue Lacosamide 100 mg bid.

## 2020-07-26 NOTE — DISCHARGE NOTE PROVIDER - HOSPITAL COURSE
Hospital Course    86F hx of dementia, R carotid endarterectomoy, CAD/CABG 5vl, multiple CVAs of unclear etiology (hx of ILR), hx CVA with sz d/o in 2012 off Keppra after 2017 was doing well until recent admission in 6/2020 with R occipital PCA infarct complicated with focal status epilepticus which was controlled with lacosamide and divalproex. Pt just D/C yesterday from rehab on anti-epileptics and sec course of antibx with cipro for recurrent UTI. New baseline, +visual deficits, but ambulating with walker and on no dysphagia diet. After returning home yesterday, pt was noticed to be extremely weak unable to get up from her chair. This am, daughter noticed pt not feeding herself and appeared to her that her R side was weak and R arm was curled in an unusual position and not ambulating. No clear sz activity. In ED, afebrile, normotensive sat 98%RA. CT head with multiple bl chronic infarcts small infarct post L frontoparietal region age indeterminate. Pt now back to baseline in terms of mental status passed dysphagia screen. Pt verbal pleasant denied any complaints. Denied HA, dizziness, CP, SOB, abd pain, dysuria.    Daughter at bedside.          pt admitted to the hospital with acute CVA - MRI revealed acute infarction involving left parietal lobe, chronic infarctions noted throughout bilateral cerebral and cerebellar hemispheres.  Pt recently discharged from acute rehab for right occipital PCA distrubution infarct.  Neurology consultation appreciated.  EEG revealed diffuse cerebral infarction.  Medically optimized on asa and statin therapies.  Pt is DNR as per family, requesting comfort measures. Hematology consult obtained for thrombocytopenia.  Depakote dose adjusted, doubt HIT, inflammatory markers wnl, hepatitis serologies negative.  Completed course of cipro for probable UTI.  Spoke with family who is requesting to discharge patient home, does not want to wait for palliative consult.  Pt is medically stable, plan to discharge home with services.        Source of Infection:    Antibiotic / Last Day: Cipro, today last day 7/26        Palliative Care / Advanced Care Planning    Code Status:DNR    Patient/Family agreeable to Hospice/Palliative (Y/N)?    Summary of Goals of Care Conversation:        Discharging Provider:  Jerry Etienne NP    Contact Info: Cell [phone] - Please call with any questions or concerns.        Outpatient Provider: Dr. Magdaleno Floyd Hospital Course    86F hx of dementia, R carotid endarterectomoy, CAD/CABG 5vl, multiple CVAs of unclear etiology (hx of ILR), hx CVA with sz d/o in 2012 off Keppra after 2017 was doing well until recent admission in 6/2020 with R occipital PCA infarct complicated with focal status epilepticus which was controlled with lacosamide and divalproex. Pt just D/C yesterday from rehab on anti-epileptics and sec course of antibx with cipro for recurrent UTI. New baseline, +visual deficits, but ambulating with walker and on no dysphagia diet. After returning home yesterday, pt was noticed to be extremely weak unable to get up from her chair. This am, daughter noticed pt not feeding herself and appeared to her that her R side was weak and R arm was curled in an unusual position and not ambulating. No clear sz activity. In ED, afebrile, normotensive sat 98%RA. CT head with multiple bl chronic infarcts small infarct post L frontoparietal region age indeterminate. Pt now back to baseline in terms of mental status passed dysphagia screen. Pt verbal pleasant denied any complaints. Denied HA, dizziness, CP, SOB, abd pain, dysuria.    Daughter at bedside.          pt admitted to the hospital with acute CVA - MRI revealed acute infarction involving left parietal lobe, chronic infarctions noted throughout bilateral cerebral and cerebellar hemispheres.  Pt recently discharged from acute rehab for right occipital PCA distrubution infarct.  Neurology consultation appreciated.  EEG revealed diffuse cerebral infarction.  Medically optimized on asa and statin therapies.  Pt is DNR as per family, requesting comfort measures. Hematology consult obtained for thrombocytopenia.  Depakote dose adjusted, doubt HIT, inflammatory markers wnl, hepatitis serologies negative.  Completed course of cipro for probable UTI.  Spoke with family who is requesting to discharge patient home, does not want to wait for palliative consult.  Pt is medically stable, plan to discharge home with services.        Source of Infection:    Antibiotic / Last Day: Cipro, today last day 7/26        Palliative Care / Advanced Care Planning    Code Status:DNR    Patient/Family agreeable to Hospice/Palliative (Y/N)?    Summary of Goals of Care Conversation:        Discharging Provider:  Jerry Etienne NP    Contact Info: Cell [phone] - Please call with any questions or concerns.        Outpatient Provider: Dr. Magdaleno Floyd            Time spent: 34 minutes

## 2020-07-26 NOTE — DISCHARGE NOTE NURSING/CASE MANAGEMENT/SOCIAL WORK - NSDCPEPTSTRK_GEN_ALL_CORE
Stroke warning signs and symptoms/Signs and symptoms of stroke/Stroke education booklet/Prescribed medications/Stroke support groups for patients, families, and friends/Call 911 for stroke/Need for follow up after discharge/Risk factors for stroke

## 2020-07-26 NOTE — PROGRESS NOTE ADULT - SUBJECTIVE AND OBJECTIVE BOX
Patient is a 86y old  Female who presents with a chief complaint of AMS (2020 11:30)    Patient seen and examined at bedside.  no acute events overnight    ALLERGIES:  lidocaine (Other)        Vital Signs Last 24 Hrs  T(F): 97.8 (2020 08:38), Max: 98.4 (2020 00:35)  HR: 81 (2020 08:38) (75 - 81)  BP: 90/47 (2020 08:38) (90/47 - 135/70)  RR: 15 (2020 08:38) (15 - 17)  SpO2: 95% (2020 08:38) (95% - 100%)  I&O's Summary    2020 07:01  -  2020 07:00  --------------------------------------------------------  IN: 170 mL / OUT: 200 mL / NET: -30 mL      MEDICATIONS:  acetaminophen   Tablet .. 650 milliGRAM(s) Oral every 6 hours PRN  aspirin enteric coated 81 milliGRAM(s) Oral daily  atorvastatin 40 milliGRAM(s) Oral at bedtime  ciprofloxacin     Tablet 250 milliGRAM(s) Oral every 12 hours  diVALproex  milliGRAM(s) Oral daily  diVALproex  milliGRAM(s) Oral at bedtime  enoxaparin Injectable 40 milliGRAM(s) SubCutaneous daily  escitalopram 5 milliGRAM(s) Oral daily  lacosamide 100 milliGRAM(s) Oral two times a day  melatonin 3 milliGRAM(s) Oral at bedtime  memantine 5 milliGRAM(s) Oral two times a day  metoprolol succinate ER 50 milliGRAM(s) Oral daily  omega-3-Acid Ethyl Esters 2 Gram(s) Oral daily  pantoprazole    Tablet 40 milliGRAM(s) Oral before breakfast      PHYSICAL EXAM:  General: NAD  ENT: MMM, no oral thrush  Neck: Supple, No JVD  Lungs: Clear to auscultation bilaterally, non labored breathing  Cardio: S1S2 regular  Abdomen: Soft, Nontender  Extremities: No cyanosis, No edema    LABS:                        11.6   4.52  )-----------( 59       ( 2020 06:00 )             34.0     07-26    142  |  108  |  15  ----------------------------<  82  3.5   |  24  |  0.75    Ca    8.3      2020 06:00    TPro  6.2  /  Alb  2.8  /  TBili  0.4  /  DBili  x   /  AST  42  /  ALT  35  /  AlkPhos  51  07-25    eGFR if Non African American: 72 mL/min/1.73M2 (20 @ 06:00)  eGFR if : 83 mL/min/1.73M2 (20 @ 06:00)    PT/INR - ( 2020 16:40 )   PT: 12.4 sec;   INR: 1.03 ratio             CARDIAC MARKERS ( 2020 19:54 )  x     / x     / 99 U/L / x     / x      CARDIAC MARKERS ( 2020 16:40 )  <.017 ng/mL / x     / x     / x     / x          07-24 Chol 122 mg/dL LDL 67 mg/dL HDL 38 mg/dL Trig 84 mg/dL                  Urinalysis Basic - ( 2020 18:00 )    Color: Yellow / Appearance: Clear / S.015 / pH: x  Gluc: x / Ketone: Small  / Bili: Negative / Urobili: 4   Blood: x / Protein: 15 / Nitrite: Negative   Leuk Esterase: Negative / RBC: 0-4 /HPF / WBC 0-2 /HPF   Sq Epi: x / Non Sq Epi: Neg.-Few / Bacteria: Negative /HPF        Culture - Urine (collected 2020 18:00)  Source: .Urine Clean Catch (Midstream)  Final Report (2020 21:54):    No growth    Culture - Urine (collected 2020 15:00)  Source: .Urine Clean Catch (Midstream)  Final Report (2020 21:32):    10,000 - 49,000 CFU/mL Escherichia coli  Organism: Escherichia coli (2020 21:32)  Organism: Escherichia coli (2020 21:32)      -  Amikacin: S <=16      -  Amoxicillin/Clavulanic Acid: S <=8/4      -  Ampicillin: S <=8 These ampicillin results predict results for amoxicillin      -  Ampicillin/Sulbactam: S <=4/2 Enterobacter, Citrobacter, and Serratia may develop resistance during prolonged therapy (3-4 days)      -  Aztreonam: S <=4      -  Cefazolin: S <=2 (MIC_CL_COM_ENTERIC_CEFAZU) For uncomplicated UTI with K. pneumoniae, E. coli, or P. mirablis: KEVIN <=16 is sensitive and KEVIN >=32 is resistant. This also predicts results for oral agents cefaclor, cefdinir, cefpodoxime, cefprozil, cefuroxime axetil, cephalexin and locarbef for uncomplicated UTI. Note that some isolates may be susceptible to these agents while testing resistant to cefazolin.      -  Cefepime: S <=2      -  Cefotaxime: S <=2      -  Cefoxitin: S <=8      -  Ceftazidime: S <=1      -  Ceftriaxone: S <=1 Enterobacter, Citrobacter, and Serratia may develop resistance during prolonged therapy      -  Cefuroxime: S <=4      -  Ciprofloxacin: S <=0.25      -  Ertapenem: S <=0.5      -  Gentamicin: S <=2      -  Levofloxacin: S <=0.5      -  Meropenem: S <=1      -  Minocycline: S <=4      -  Nitrofurantoin: S <=32 Should not be used to treat pyelonephritis      -  Piperacillin/Tazobactam: S <=8      -  Tigecycline: S <=2      -  Tobramycin: S <=2      -  Trimethoprim/Sulfamethoxazole: S <=0.5/9.5      Method Type: KEVIN        RADIOLOGY & ADDITIONAL TESTS:    Care Discussed with Consultants/Other Providers:

## 2020-07-26 NOTE — DISCHARGE NOTE NURSING/CASE MANAGEMENT/SOCIAL WORK - PATIENT PORTAL LINK FT
You can access the FollowMyHealth Patient Portal offered by [facility] by registering at the following website: [email or web address]. By joining [facility]’s FollowMyHealth portal, you will also be able to view your health information using other applications (apps) compatible with our system.

## 2020-07-26 NOTE — PHYSICAL THERAPY INITIAL EVALUATION ADULT - ADDITIONAL COMMENTS
Pt is confused and is unable to provide history. Per chart, atpadma resides in a house without steps inside or out.  Patient lives with her  and daughter Indira's family, [facility] dtr Indira, Patient has a WC, cane, walker, commode, bed rails and grab bars in the bathroom.  24/7 private hire aides were in place along with [facility] RN and PT.

## 2020-07-26 NOTE — PROGRESS NOTE ADULT - SUBJECTIVE AND OBJECTIVE BOX
Patient is an 86 year old woman who was admitted yesterday, 7/23/20 following a recent discharge from Rehabilitation a few days ago. The daughter had noted that  the day PTA, 7/22/20  the patient was sitting in a chair and was weak in general and had difficulty getting out of the chair. The day of admission, yesterday, 7/23/20, she was not moving her right arm well and later observed in the hospital by the daughter to be back to baseline. The patient has had multiple bilateral CVAs in the past. The most recent 6/2020 with right occipital infarction. During that admission also found to have focal status and placed on lacosamide and valproic acid. the patient denies HA or other complaints. Today, Sunday, she denies HA or other complaints.      PMH: As above Multiple CVAs. Last CVA 6/2020 with right Occipital infarction.           S/P Right carotid endarterectomy            Seizures in 2012 after a stroke and placed on Keppra until 2017.            June, 2020 -focal status epilepticus-lacosamide 100 mg bid and Valproic Acid 500mg bid            CAD/CABG            HLD            Breast CA    SH: Allergy-Lidocaine    [facility]: MI, Ovarian CA    Exam: Awake, slowed mentation, responds to questions with 2-3 word appropriate answers, follows simple commands occasionally            Pupils 2.5mm, VF-denies  vision             No Facial asymmetry             Motor tone and strength-Lifts all extremities in air including the left upper extremity which previously remained flexed at the elbow and was resistant to extension.                                     11.2   4.57  )-----------( 63       ( 24 Jul 2020 05:00 )             34.7                           12.0   3.88  )-----------( 59       ( 25 Jul 2020 09:25 )             35.2                           11.6   4.52  )-----------( 59       ( 26 Jul 2020 06:00 )             34.0         07-24    145  |  109<H>  |  21  ----------------------------<  91  3.6   |  26  |  0.87    Ca    8.3<L>      24 Jul 2020 05:00    TPro  5.8<L>  /  Alb  2.9<L>  /  TBili  0.4  /  DBili  x   /  AST  35  /  ALT  36  /  AlkPhos  51  07-24    Valproic Acid Level, Serum: 93 ug/mL (07.24.20 @ 05:00)    < from: CT Brain Stroke Protocol (07.23.20 @ 17:08) >    FINDINGS:  There is moderate diffuse parenchymal volume loss. There are areas of low attenuation in the periventricular white matter likely related to mild chronic microvascular ischemic changes.    Multiple chronic infarcts bilateral frontal, parietal and occipital lobes. Small chronic infarcts right cerebellum.    Small age-indeterminate infarct posterior left frontal parietal region measuring approximately 1.0 x 2.0 cm.    There is no acute intracranial hemorrhage, parenchymal mass,  or midline shift. . There is no hydrocephalus.    The cranium is intact. The visualized paranasal sinuses are well-aerated.    IMPRESSION:  No acute intracranial hemorrhage.      Multiple bilateral chronic infarcts. Age-indeterminate small infarct posterior left frontal parietal region. MRI exam recommended.      < from: MR Head No Cont (07.24.20 @ 14:44) >    FINDINGS:  Motion artifact limits evaluation. Within these limits:    Acute infarction is noted involving the left parietal lobe, involving the postcentral gyrus. Small focus of reduced diffusion noted in the right occipital lobe in the periventricular region, possibly representing additional acute infarction versus residual reduced diffusion from infarction noted on 6/30/2020 MRI.    Chronic infarctions are noted throughout the bilateral cerebral and cerebellar hemispheres. There is parenchymal volume loss with ex vacuo dilatation of the lateral ventricles. No large parenchymal hemorrhage is identified.     There is no hydrocephalus. No extra-axial fluid collections. The skull base flow voids are present.    The visualized intraorbital contents are normal. The imaged portions of the paranasal sinuses are clear. Fluid within the bilateral mastoid air cells.    IMPRESSION:   Acute infarction involving the left parietal lobe. Possible small acute infarction involving the right occipital periventricular region versus residual reduced diffusion from acute infarction noted on 6/30/2020 MRI. Numerous other cerebral and cerebellar chronic infarctions again noted.    No large parenchymal hemorrhage.    < from: US Duplex Carotid Arteries Complete, Bilateral (07.25.20 @ 11:35) >    FINDINGS:    Blood flow velocities are as follows:    RIGHT (PSV in cm/s):       PROX CCA = 35; DIST CCA = 46; PROX ICA = 40; DIST ICA = 104; ECA = 372    LEFT (PSV in cm/s):       PROX CCA = 67; DIST CCA = 56; PROX ICA = 46; DIST ICA = 71; ECA = 74    Measurement of carotid stenosis is based on velocity parameters that correlate the residual internal carotid diameter with that of the more distal vessel in accordance with a method such as the [employer] Symptomatic Carotid Endarterectomy Trial(NASCET).    Mild calcified intimal plaque is present in both carotid systems. No disturbed color-flow or elevated blood flow velocities are encountered in the internal carotid arteries. Elevated right external carotid artery velocities are noted. Antegrade flow is present in both vertebral arteries.    IMPRESSION:    No duplex evidence of hemodynamically significant internal carotid artery stenosis.            < from: TTE Echo Complete w/o Contrast w/ Doppler (07.24.20 @ 09:31) >    Summary:   1. Left ventricular ejection fraction, by visual estimation, is60 to 65%.   2. Technically fair study.   3. Normal global left ventricular systolic function.   4. Mildly increased LV wall thickness.   5. Spectral Doppler shows impaired relaxation pattern of left ventricular myocardial filling (Grade I diastolic dysfunction).   6. There is moderate concentric left ventricular hypertrophy.   7. Mild thickening and calcification of the anterior and posterior mitral valve leaflets.   8. Mild-moderate tricuspid regurgitation.   9. Mild to moderate aortic regurgitation.  10. Sclerotic aortic valve with decreased opening.  11. Moderate pulmonic valve regurgitation.  12. Pulmonary hypertension is absent.  13. LA volume Index is 16.2 ml/m² ml/m2.  14. Peak transaortic gradient equals 18.7 mmHg, mean transaortic gradient equals 9.5 mmHg, the calculated aortic valve area equals 0.96 cm² by the continuity equation consistent with moderate aortic stenosis.    EEG-7/24/20  EEG Summary/Classification:  Abnormal EEG in a lethargic drowy patient  -background slowing, generalized  -equivocal sharply contoured activity in right central region, as described above, of unclear significance.     EEG Impression/Clinical Correlate:  There is diffuse cerebral dysfunction.  There are sharply contoured waves in the right central region of equivocal significance. If clinically indicated, a continuous 24h EEG may be helpful.   ________________________________________

## 2020-07-28 NOTE — DISCUSSION/SUMMARY
[Home] : patient was discharged to home [FreeTextEntry1] : Spoke with pt's daughter f/u s/p hospitalization,as per pt's daughter, pt  has new PCP, pt is no longer using this practice.

## 2020-08-04 PROBLEM — I63.323: Status: RESOLVED | Noted: 2020-01-01 | Resolved: 2020-01-01

## 2020-08-04 NOTE — REVIEW OF SYSTEMS
[Feeling Tired] : feeling tired [Confused or Disoriented] : confusion [Seizures] : convulsions [Difficulty Walking] : difficulty walking [Memory Lapses or Loss] : memory loss [Inability to Walk] : inability to walk [Change In Personality] : personality change [Fever] : no fever [Chest Pain] : no chest pain [Shortness Of Breath] : no shortness of breath [Chills] : no chills

## 2020-08-04 NOTE — REASON FOR VISIT
[Follow-Up: _____] : a [unfilled] follow-up visit [Home] : at home, [unfilled] , at the time of the visit. [Family Member] : family member [Medical Office: ([facility])___] : at the medical office located in  [Verbal consent obtained from patient] : the patient, [unfilled] [Formal Caregiver] : formal caregiver

## 2020-08-04 NOTE — HISTORY OF PRESENT ILLNESS
[FreeTextEntry1] : Ms. Romero is an 84-year-old woman with PMH bilateral occipital stroke and right carotid stenosis s/p endarterectomy who presents for a telehealth appointment. She was admitted to the hospital 6/30/20 with left-sided focal seizures and visual field deficits. Subclinical seizures noted on EEG and MRI from admission showed acute right occipital lobe PCA distribution infarct, multiple chronic cerebral infarcts with hemosiderin deposition within the right frontal and right occipital regions and chronic right cerebellar infarcts. The cause of her strokes was not determined on that admission and she was discharged to [facility] [place]. She was readmitted on 7/24/20 after her daughter noted that she was too weak to get up from her chair and right-sided weakness. MRI during that hospitalization found a new acute infarction involving the left parietal lobe. Possible small acute infarction involving the right occipital periventricular region versus residual reduced diffusion from acute infarction noted on 6/30/2020 MRI. \par Numerous other cerebral and cerebellar chronic infarctions again noted. Carotid US from 7/25/20 showed no duplex evidence of hemodynamically significant internal carotid artery stenosis. Daughter reports that her mother is having difficulty ambulating, requires full time care and is tired all the time. Daughter reports that her mother is having difficulty sleeping and is having severe "sun downing" and she denies any clinical signs of seizure. \par

## 2020-08-04 NOTE — DISCUSSION/SUMMARY
[FreeTextEntry1] : Ms. Romero is an 84-year-old woman with PMH of occipital and parietal CVA and right carotid stenosis s/p endarterectomy approximately 12 years ago. She presents today with her daughter Indira for a telehealth appointment. She was hospitalized twice recently with new occipital and parietal CVAs on MRI. The cause of her strokes remains unclear. As per Indira, patient's daughter, family is considering palliative care. Their biggest concern at this time is addressing her inability to sleep at night and sundowning. Plan to begin 2.5mg Olanzapine PRN at bedtime and to follow-up with Araceli Del Cid NP via telephone in 3-4 days. We will titrate the dose up if needed.  All of their questions and concerns were addressed.

## 2020-08-04 NOTE — PHYSICAL EXAM
[General Appearance - Alert] : alert [General Appearance - In No Acute Distress] : in no acute distress [FreeTextEntry1] : I

## 2020-08-19 PROBLEM — R45.1 AGITATION: Status: ACTIVE | Noted: 2020-01-01

## 2020-08-31 NOTE — CONSULT NOTE ADULT - ATTENDING COMMENTS
altered mental status  elderly woman status post CABG now rule out cva. no new cardiac recommendations. aspirin if ok from a neuro standpoint. 18

## 2020-09-29 ENCOUNTER — RX RENEWAL (OUTPATIENT)
Age: 85
End: 2020-09-29

## 2020-10-07 ENCOUNTER — APPOINTMENT (OUTPATIENT)
Dept: INTERNAL MEDICINE | Facility: CLINIC | Age: 85
End: 2020-10-07

## 2021-07-24 NOTE — ED PROVIDER NOTE - NIH STROKE SCALE: 10. DYSARTHRIA, QM
no precautions noted (1) Mild-to-moderate dysarthria; patient slurs at least some words and, at worst, can be understood with some difficulty

## 2022-08-21 NOTE — ED ADULT TRIAGE NOTE - HEIGHT IN CM
[FreeTextEntry1] : In review March 2022:\par FU for 46 year old WF with PMH as stated in HPI / active list. \par \par Management : \par \par See HPI and Plan.\par \par Glimepiride regimen to be halted, Januvia regimen to be initiated.  Increase Metformin intake to 3 times a day from BID.\par \par Advised to attempt weight loss through improved  eating habits by avoiding fast foods/processed foods/ junk food .\par Increase vegetable and salads. \par Encouraged to consider  joining [employer] if BMI > 25. \par Advised to take stairs at work.  Park car as far as possible when arriving at work in the morning. Importance of daily 30 minute walk stressed for exercise. \par \par Advised follow up with dentist.  \par \par GOAL A1C is 6.5%, currently 7.8\par \par Best wishes offered! 
170.18

## 2023-03-22 NOTE — SWALLOW BEDSIDE ASSESSMENT ADULT - SPECIFY REASON(S)
Answered call light. Pt requested to have bed repositioned. Provided emesis bag because pt reported feeling nauseated.   to assess swallow mechanism; r/o dysphagia

## 2024-11-22 NOTE — PHYSICAL THERAPY INITIAL EVALUATION ADULT - STRENGTHENING, PT EVAL
GOAL: (to be met in 4 wks) increased BUE/BLE strength to 5/5 to decrease assistance with functional activities Abdomen soft, non-tender and non-distended, no rebound, no guarding and no masses. no hepatosplenomegaly.

## 2025-03-30 NOTE — PROGRESS NOTE ADULT - ASSESSMENT
86 female with dementia, right carotid endarterectomy, cad/cabg 5 vl, multiple CVA  s of unclear etiology, s/p recent admission to acute rehab 2/2 right occipital PCA infarct complicated by focal status epilepticus which was controlled with lacosamide and divalproex now presents with extreme weakness, unable to get up from her chair, right arm weakness.    # Acute CVA  neuro consult appreciated  pt with hx of multiple bilateral CVAs in the past, also s/p recent admission in acute rehab for right occipital PCA distribution infarct  CTH in ED on 7/23 unremarkable  MRI on 7/24 showed acute infarction involving left parietal lobe, chronic infarctions are noted throughout bilateral cerebral and cerebellar hemispheres  ECHO done on 7/24 showed normal EF, grade 1 DD, mild-mod TR/AR, no thrombus  EEG revealed diffuse cerebral dysfunction  continue asa and statin therapies  pt is tolerating dash/tlc diet    # Seizure disorder  pt had recent cva in 6/2020, status epilepticus loaded with lacosamide and depakote  plan to continue lacosamide and divalproex  EEG showed diffuse cerebral dysfunction  neuro following    # Thrombocytopenia  hematology consult appreciated  platelets today 59 down from 63 today  low probability of HIT  may be related to depakote, (frequency changed from BID to daily)  Inflammatory markers all wnl  continue to monitor    # HTN  continue toprol with holding parameters    # UTI  plan to complete course of cipro    # Dementia  chronic stable condition  continue memantine show

## 2025-06-26 NOTE — PATIENT PROFILE ADULT - NSPROMUTANXFEARADDRESSFT_GEN_A_NUR
[FreeTextEntry1] : Reviewed today's breast imaging with the patient in great detail.  Offered patient a physical copy of today's breast imaging, however patient declined.  Patient verbalized understanding.  Reviewed clinical exam findings the patient in great detail.  Patient verbalized understanding.  Patient reports routine follow-up with PCP, GYN, and dermatology. unable to assess